# Patient Record
Sex: FEMALE | Race: WHITE | Employment: OTHER | URBAN - NONMETROPOLITAN AREA
[De-identification: names, ages, dates, MRNs, and addresses within clinical notes are randomized per-mention and may not be internally consistent; named-entity substitution may affect disease eponyms.]

---

## 2017-11-15 ENCOUNTER — OFFICE VISIT (OUTPATIENT)
Dept: NEUROLOGY | Age: 82
End: 2017-11-15
Payer: MEDICARE

## 2017-11-15 VITALS
WEIGHT: 88 LBS | BODY MASS INDEX: 18.99 KG/M2 | HEART RATE: 62 BPM | SYSTOLIC BLOOD PRESSURE: 138 MMHG | HEIGHT: 57 IN | DIASTOLIC BLOOD PRESSURE: 64 MMHG

## 2017-11-15 DIAGNOSIS — R56.9 PARTIAL SEIZURE (HCC): Primary | ICD-10-CM

## 2017-11-15 PROCEDURE — G8420 CALC BMI NORM PARAMETERS: HCPCS | Performed by: PSYCHIATRY & NEUROLOGY

## 2017-11-15 PROCEDURE — 1036F TOBACCO NON-USER: CPT | Performed by: PSYCHIATRY & NEUROLOGY

## 2017-11-15 PROCEDURE — G8427 DOCREV CUR MEDS BY ELIG CLIN: HCPCS | Performed by: PSYCHIATRY & NEUROLOGY

## 2017-11-15 PROCEDURE — 1123F ACP DISCUSS/DSCN MKR DOCD: CPT | Performed by: PSYCHIATRY & NEUROLOGY

## 2017-11-15 PROCEDURE — G8484 FLU IMMUNIZE NO ADMIN: HCPCS | Performed by: PSYCHIATRY & NEUROLOGY

## 2017-11-15 PROCEDURE — 4040F PNEUMOC VAC/ADMIN/RCVD: CPT | Performed by: PSYCHIATRY & NEUROLOGY

## 2017-11-15 PROCEDURE — 99213 OFFICE O/P EST LOW 20 MIN: CPT | Performed by: PSYCHIATRY & NEUROLOGY

## 2017-11-15 PROCEDURE — 1090F PRES/ABSN URINE INCON ASSESS: CPT | Performed by: PSYCHIATRY & NEUROLOGY

## 2017-11-15 RX ORDER — LEVETIRACETAM 500 MG/1
500 TABLET ORAL 2 TIMES DAILY
Qty: 180 TABLET | Refills: 3 | Status: SHIPPED | OUTPATIENT
Start: 2017-11-15 | End: 2018-02-12 | Stop reason: SDUPTHER

## 2017-12-19 ENCOUNTER — OFFICE VISIT (OUTPATIENT)
Dept: FAMILY MEDICINE CLINIC | Age: 82
End: 2017-12-19
Payer: MEDICARE

## 2017-12-19 VITALS
HEIGHT: 57 IN | DIASTOLIC BLOOD PRESSURE: 80 MMHG | WEIGHT: 84 LBS | HEART RATE: 68 BPM | TEMPERATURE: 97.8 F | BODY MASS INDEX: 18.12 KG/M2 | RESPIRATION RATE: 18 BRPM | SYSTOLIC BLOOD PRESSURE: 122 MMHG

## 2017-12-19 DIAGNOSIS — K05.10 GINGIVITIS DUE TO DENTAL PLAQUE: Primary | ICD-10-CM

## 2017-12-19 PROCEDURE — 99213 OFFICE O/P EST LOW 20 MIN: CPT | Performed by: NURSE PRACTITIONER

## 2017-12-19 RX ORDER — CHLORHEXIDINE GLUCONATE 0.12 MG/ML
15 RINSE ORAL 2 TIMES DAILY
Qty: 420 ML | Refills: 0 | Status: SHIPPED | OUTPATIENT
Start: 2017-12-19 | End: 2018-01-02

## 2017-12-19 RX ORDER — DICYCLOMINE HCL 20 MG
TABLET ORAL
COMMUNITY
Start: 2017-12-12 | End: 2017-12-19

## 2017-12-19 RX ORDER — POTASSIUM CHLORIDE 750 MG/1
20 TABLET, FILM COATED, EXTENDED RELEASE ORAL
COMMUNITY
Start: 2017-12-19

## 2017-12-19 RX ORDER — CHOLESTYRAMINE 4 G/9G
POWDER, FOR SUSPENSION ORAL
COMMUNITY
Start: 2017-09-13 | End: 2018-11-19

## 2017-12-19 RX ORDER — LOPERAMIDE HYDROCHLORIDE 2 MG/1
2 CAPSULE ORAL 4 TIMES DAILY PRN
Status: ON HOLD | COMMUNITY
End: 2021-02-04

## 2017-12-19 ASSESSMENT — ENCOUNTER SYMPTOMS
RHINORRHEA: 0
SHORTNESS OF BREATH: 0
VOMITING: 0
SORE THROAT: 1
COUGH: 0
TROUBLE SWALLOWING: 0

## 2017-12-19 NOTE — PROGRESS NOTES
[address]  [address] [address]  Dept: [phone]  Dept Fax: [phone] [facility]       Chief Complaint   Patient presents with    Oral Pain     tongue sores and cheek sores        Nurses Notes reviewed and I agree except as noted in the HPI. HISTORY OF PRESENT ILLNESS   Taj Lan is a 80 y.o. female who presents with c/o tongue and mouth pain. Acute on chronic. Initial onset \"10 years\" ago. Symptoms waxing/waining. Mouth pain is intermittent. Rates 6/61, worse with certain food/fluids. Associated wt loss due to pancrease issues. 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 Treated by Dr. Armin Aj with topical Fluocinonide. Patient ran out of medication, but also notes it never worked. She has also been treated by her dentist with Peridex and states that it worked the best.  [address] significant for vitamin B12 deficiency. She has appt with PCP on Friday. REVIEW OF SYSTEMS     Review of Systems   Constitutional: Negative for appetite change, chills, diaphoresis, fatigue, fever and unexpected weight change. HENT: Positive for sore throat (sore tongue and gums). Negative for congestion, ear pain, rhinorrhea and trouble swallowing. Respiratory: Negative for cough and shortness of breath. Cardiovascular: Negative for chest pain. Gastrointestinal: Negative for vomiting. Skin: Negative for rash. Hematological: Negative for adenopathy. PAST MEDICAL HISTORY         Diagnosis Date    Chronic pancreatitis ([address]     Fibromyalgia     GERD (gastroesophageal reflux disease)     Hyperlipidemia     Hypertension     Low potassium syndrome     Osteoarthritis     Raynaud disease     Seizures ([address]        SURGICAL HISTORY     Patient  has a past surgical history that includes Appendectomy;  Cholecystectomy; eye surgery; hernia repair; Hysterectomy; Colonoscopy; and Upper gastrointestinal endoscopy. CURRENT MEDICATIONS       Current Outpatient Prescriptions on File Prior to Visit   Medication Sig Dispense Refill    levETIRAcetam (KEPPRA) 500 MG tablet Take 1 tablet by mouth 2 times daily 180 tablet 3    amLODIPine (NORVASC) 2.5 MG tablet Take 2.5 mg by mouth daily      pantoprazole (PROTONIX) 40 MG tablet Take 1 tablet by mouth 2 times daily (Patient taking differently: Take 40 mg by mouth daily ) 30 tablet 3    magnesium oxide (MAG-OX) 400 (240 MG) MG tablet Take 1 tablet by mouth daily 30 tablet 0    dicyclomine (BENTYL) 10 MG capsule Take 20 mg by mouth 3 times daily       atorvastatin (LIPITOR) 40 MG tablet Take 40 mg by mouth daily      metoprolol tartrate (LOPRESSOR) 25 MG tablet Take 25 mg by mouth 2 times daily      vitamin D3 (CHOLECALCIFEROL) 400 UNITS TABS tablet Take 400 Units by mouth daily      mirtazapine (REMERON) 15 MG tablet Take 15 mg by mouth nightly      QUEtiapine (SEROQUEL) 25 MG tablet Take 25 mg by mouth nightly       melatonin 3 MG TABS tablet Take 3 mg by mouth daily      cyanocobalamin 1000 MCG/ML injection Inject 1,000 mcg into the muscle every 30 days       Multiple Vitamin (MULTI VITAMIN PO) Take 1 tablet by mouth daily       No current facility-administered medications on file prior to visit. ALLERGIES     Patient is is allergic to amoxil [amoxicillin]; clindamycin/lincomycin; lyrica [pregabalin]; procardia [nifedipine]; and tenormin [atenolol]. FAMILY HISTORY     Patient's family history includes Arthritis in her father; Cancer in her mother; Hearing Loss in her father; Heart Disease in her maternal grandmother; High Blood Pressure in her maternal aunt, maternal grandmother, and mother; Stroke in her father. SOCIAL HISTORY     Patient  reports that she has never smoked. She has never used smokeless tobacco. She reports that she does not drink alcohol or use drugs.     PHYSICAL EXAM     ED TRIAGE VITALS  BP: 122/80, Temp: plaque         DISPOSITION/PLAN     Non-toxic, no acute distress. Exam c/w gingivitis. She also has tongue swelling with no color change. No erythema or ulcerations to tongue. Patient discharged home in stable condition. Rx Peridex. Discharge instructions given by staff. PATIENT REFERRED TO:    Follow up with PCP as scheduled. If worse in any way please go to ER.     DISCHARGE MEDICATIONS:  New Prescriptions    CHLORHEXIDINE (PERIDEX) 0.12 % SOLUTION    Take 15 mLs by mouth 2 times daily for 14 days         Electronically signed by Rina Lan NP on 12/19/2017 at 10:31 AM

## 2017-12-19 NOTE — PATIENT INSTRUCTIONS
box to learn more about \"Periodontal Conditions: Care Instructions. \"     If you do not have an account, please click on the \"Sign Up Now\" link. Current as of: May 12, 2017  Content Version: 11.4  © 6278-3846 [employer]. Care instructions adapted under license by [facility] ([facility]). If you have questions about a medical condition or this instruction, always ask your healthcare professional. [address] any warranty or liability for your use of this information.

## 2018-02-12 RX ORDER — LEVETIRACETAM 500 MG/1
500 TABLET ORAL 2 TIMES DAILY
Qty: 180 TABLET | Refills: 3 | Status: SHIPPED | OUTPATIENT
Start: 2018-02-12 | End: 2018-11-19 | Stop reason: SDUPTHER

## 2018-11-19 ENCOUNTER — OFFICE VISIT (OUTPATIENT)
Dept: NEUROLOGY | Age: 83
End: 2018-11-19
Payer: MEDICARE

## 2018-11-19 VITALS
SYSTOLIC BLOOD PRESSURE: 106 MMHG | HEIGHT: 57 IN | DIASTOLIC BLOOD PRESSURE: 72 MMHG | HEART RATE: 62 BPM | WEIGHT: 87 LBS | BODY MASS INDEX: 18.77 KG/M2

## 2018-11-19 DIAGNOSIS — G40.909 SEIZURE DISORDER (HCC): Primary | ICD-10-CM

## 2018-11-19 DIAGNOSIS — M79.601 RIGHT ARM PAIN: ICD-10-CM

## 2018-11-19 DIAGNOSIS — R29.898 RIGHT ARM WEAKNESS: ICD-10-CM

## 2018-11-19 PROCEDURE — 1036F TOBACCO NON-USER: CPT | Performed by: PSYCHIATRY & NEUROLOGY

## 2018-11-19 PROCEDURE — G8484 FLU IMMUNIZE NO ADMIN: HCPCS | Performed by: PSYCHIATRY & NEUROLOGY

## 2018-11-19 PROCEDURE — 99213 OFFICE O/P EST LOW 20 MIN: CPT | Performed by: PSYCHIATRY & NEUROLOGY

## 2018-11-19 PROCEDURE — 1101F PT FALLS ASSESS-DOCD LE1/YR: CPT | Performed by: PSYCHIATRY & NEUROLOGY

## 2018-11-19 PROCEDURE — G8420 CALC BMI NORM PARAMETERS: HCPCS | Performed by: PSYCHIATRY & NEUROLOGY

## 2018-11-19 PROCEDURE — 4040F PNEUMOC VAC/ADMIN/RCVD: CPT | Performed by: PSYCHIATRY & NEUROLOGY

## 2018-11-19 PROCEDURE — 1123F ACP DISCUSS/DSCN MKR DOCD: CPT | Performed by: PSYCHIATRY & NEUROLOGY

## 2018-11-19 PROCEDURE — 1090F PRES/ABSN URINE INCON ASSESS: CPT | Performed by: PSYCHIATRY & NEUROLOGY

## 2018-11-19 PROCEDURE — G8428 CUR MEDS NOT DOCUMENT: HCPCS | Performed by: PSYCHIATRY & NEUROLOGY

## 2018-11-19 RX ORDER — LEVETIRACETAM 500 MG/1
500 TABLET ORAL 2 TIMES DAILY
Qty: 180 TABLET | Refills: 3 | Status: SHIPPED | OUTPATIENT
Start: 2018-11-19 | End: 2019-11-18 | Stop reason: SDUPTHER

## 2018-11-19 RX ORDER — DIPHENOXYLATE HYDROCHLORIDE AND ATROPINE SULFATE 2.5; .025 MG/1; MG/1
1 TABLET ORAL 4 TIMES DAILY PRN
Status: ON HOLD | COMMUNITY
End: 2021-02-04

## 2018-11-19 RX ORDER — TRIAMCINOLONE ACETONIDE 0.25 MG/G
OINTMENT TOPICAL 2 TIMES DAILY PRN
COMMUNITY

## 2018-11-19 NOTE — PROGRESS NOTES
Plan:  1. Referral to Dr Haris Jean Baptiste re right upper extremity weakness and pain. 2. Continue current Keppra 500 mg oral twice a day, refills given. 3.  Follow up in 1 year or sooner if needed. 4. Report any new events. Call if any questions or concerns. Please call if any questions.      Mikhail Hudson MD

## 2019-11-18 ENCOUNTER — OFFICE VISIT (OUTPATIENT)
Dept: NEUROLOGY | Age: 84
End: 2019-11-18
Payer: MEDICARE

## 2019-11-18 VITALS
HEART RATE: 68 BPM | WEIGHT: 81.6 LBS | DIASTOLIC BLOOD PRESSURE: 82 MMHG | SYSTOLIC BLOOD PRESSURE: 122 MMHG | BODY MASS INDEX: 18.88 KG/M2 | HEIGHT: 55 IN

## 2019-11-18 DIAGNOSIS — G40.909 SEIZURE DISORDER (HCC): Primary | ICD-10-CM

## 2019-11-18 PROCEDURE — 1036F TOBACCO NON-USER: CPT | Performed by: PSYCHIATRY & NEUROLOGY

## 2019-11-18 PROCEDURE — G8427 DOCREV CUR MEDS BY ELIG CLIN: HCPCS | Performed by: PSYCHIATRY & NEUROLOGY

## 2019-11-18 PROCEDURE — G8484 FLU IMMUNIZE NO ADMIN: HCPCS | Performed by: PSYCHIATRY & NEUROLOGY

## 2019-11-18 PROCEDURE — 4040F PNEUMOC VAC/ADMIN/RCVD: CPT | Performed by: PSYCHIATRY & NEUROLOGY

## 2019-11-18 PROCEDURE — 99213 OFFICE O/P EST LOW 20 MIN: CPT | Performed by: PSYCHIATRY & NEUROLOGY

## 2019-11-18 PROCEDURE — 1123F ACP DISCUSS/DSCN MKR DOCD: CPT | Performed by: PSYCHIATRY & NEUROLOGY

## 2019-11-18 PROCEDURE — 1090F PRES/ABSN URINE INCON ASSESS: CPT | Performed by: PSYCHIATRY & NEUROLOGY

## 2019-11-18 PROCEDURE — G8420 CALC BMI NORM PARAMETERS: HCPCS | Performed by: PSYCHIATRY & NEUROLOGY

## 2019-11-18 RX ORDER — FAMOTIDINE 40 MG/1
40 TABLET, FILM COATED ORAL NIGHTLY
Status: ON HOLD | COMMUNITY
End: 2020-08-04 | Stop reason: HOSPADM

## 2019-11-18 RX ORDER — LEVETIRACETAM 500 MG/1
500 TABLET ORAL 2 TIMES DAILY
Qty: 180 TABLET | Refills: 3 | Status: SHIPPED | OUTPATIENT
Start: 2019-11-18 | End: 2020-09-16 | Stop reason: SDUPTHER

## 2020-07-14 ENCOUNTER — TELEPHONE (OUTPATIENT)
Dept: NEUROLOGY | Age: 85
End: 2020-07-14

## 2020-07-16 ENCOUNTER — TELEPHONE (OUTPATIENT)
Dept: NEUROLOGY | Age: 85
End: 2020-07-16

## 2020-07-16 NOTE — TELEPHONE ENCOUNTER
----- Message from Elizabeth Vazquez MD sent at 7/16/2020  2:13 PM EDT -----  Please let the patient know the Keppra level is elevated at 55, can we schedule her for a virtual visit early next week with me thank you.    Elizabeth Vazquez MD

## 2020-07-16 NOTE — RESULT ENCOUNTER NOTE
Please let the patient know the Keppra level is elevated at 55, can we schedule her for a virtual visit early next week with me thank you.    Aubrey Anna MD

## 2020-07-22 ENCOUNTER — OFFICE VISIT (OUTPATIENT)
Dept: NEUROLOGY | Age: 85
End: 2020-07-22
Payer: MEDICARE

## 2020-07-22 VITALS
WEIGHT: 94.4 LBS | DIASTOLIC BLOOD PRESSURE: 80 MMHG | HEART RATE: 88 BPM | HEIGHT: 55 IN | SYSTOLIC BLOOD PRESSURE: 112 MMHG | BODY MASS INDEX: 21.85 KG/M2

## 2020-07-22 PROCEDURE — G8420 CALC BMI NORM PARAMETERS: HCPCS | Performed by: PSYCHIATRY & NEUROLOGY

## 2020-07-22 PROCEDURE — 1036F TOBACCO NON-USER: CPT | Performed by: PSYCHIATRY & NEUROLOGY

## 2020-07-22 PROCEDURE — 99213 OFFICE O/P EST LOW 20 MIN: CPT | Performed by: PSYCHIATRY & NEUROLOGY

## 2020-07-22 PROCEDURE — 4040F PNEUMOC VAC/ADMIN/RCVD: CPT | Performed by: PSYCHIATRY & NEUROLOGY

## 2020-07-22 PROCEDURE — 1090F PRES/ABSN URINE INCON ASSESS: CPT | Performed by: PSYCHIATRY & NEUROLOGY

## 2020-07-22 PROCEDURE — 1123F ACP DISCUSS/DSCN MKR DOCD: CPT | Performed by: PSYCHIATRY & NEUROLOGY

## 2020-07-22 PROCEDURE — G8427 DOCREV CUR MEDS BY ELIG CLIN: HCPCS | Performed by: PSYCHIATRY & NEUROLOGY

## 2020-07-22 RX ORDER — BUDESONIDE 3 MG/1
6 CAPSULE, COATED PELLETS ORAL EVERY MORNING
COMMUNITY

## 2020-07-22 RX ORDER — ONDANSETRON HYDROCHLORIDE 8 MG/1
8 TABLET, FILM COATED ORAL EVERY 8 HOURS PRN
COMMUNITY

## 2020-07-22 RX ORDER — MECLIZINE HYDROCHLORIDE 25 MG/1
12.5 TABLET ORAL 3 TIMES DAILY PRN
COMMUNITY

## 2020-07-22 RX ORDER — FUROSEMIDE 20 MG/1
20 TABLET ORAL 2 TIMES DAILY
Status: ON HOLD | COMMUNITY
End: 2020-08-04 | Stop reason: SDUPTHER

## 2020-07-22 NOTE — PATIENT INSTRUCTIONS
1. Change Keppra to 500 mg in am and 250 mg at night. 2. Continue with physical therapy recommendations re gait safety. 3. Recheck Keppra level in two weeks. 4. Seizure precautions. 5. Follow up in 1 year or sooner if needed. 6. Report any new events. Call if any questions or concerns.

## 2020-07-22 NOTE — PROGRESS NOTES
NEUROLOGY OUT PATIENT FOLLOW UP NOTE:  7/22/20202:03 PM    Cindy Nix is here for follow up for seizure disorder. The patient denies any seizure. Her daughter Jessenia Torres called stating patient is having balance issues with falls for the past 6 months. 2 weeks ago, she had a witnessed fall and hit her head. She went to the ED for evaluation. She is taking Keppra 500 mg 2 times a day. Daughter denies missing any doses. She comes in with family to discuss plan of care going forward. .     ROS:  Respiratory : no cough, no SOB  Skin: no rash. No flank pain, of hematuria.        Allergies   Allergen Reactions    Amoxil [Amoxicillin] Other (See Comments)     SORES IN MOUTH    Clindamycin/Lincomycin Other (See Comments)     SORES IN MOUTH    Lyrica [Pregabalin] Other (See Comments)     SORES IN MOUTH    Procardia [Nifedipine] Other (See Comments)     SORES IN MOUTH    Tenormin [Atenolol] Other (See Comments)     SORES IN MOUTH       Current Outpatient Medications:     furosemide (LASIX) 20 MG tablet, Take 20 mg by mouth 2 times daily, Disp: , Rfl:     budesonide (ENTOCORT EC) 3 MG extended release capsule, Take 6 mg by mouth every morning, Disp: , Rfl:     ondansetron (ZOFRAN) 8 MG tablet, Take 8 mg by mouth every 8 hours as needed for Nausea or Vomiting, Disp: , Rfl:     Magic Mouthwash (MIRACLE MOUTHWASH), Swish and spit 5 mLs 4 times daily as needed for Irritation, Disp: , Rfl:     meclizine (ANTIVERT) 25 MG tablet, Take 25 mg by mouth 3 times daily as needed, Disp: , Rfl:     levETIRAcetam (KEPPRA) 500 MG tablet, Take 1 tablet by mouth 2 times daily, Disp: 180 tablet, Rfl: 3    ZENPEP 74495 units CPEP, Take by mouth 3 times daily (with meals) , Disp: , Rfl:     KLOR-CON 10 10 MEQ extended release tablet, Take 10 mEq by mouth 3 times daily Take 1 tablet in AM, 2 tablets at noon, and 1 tablet at supper, Disp: , Rfl:     amLODIPine (NORVASC) 2.5 MG tablet, Take 2.5 mg by mouth daily, Disp: , Rfl:    Multiple Vitamin (MULTI VITAMIN PO), Take 1 tablet by mouth daily, Disp: , Rfl:     pantoprazole (PROTONIX) 40 MG tablet, Take 1 tablet by mouth 2 times daily (Patient taking differently: Take 40 mg by mouth daily ), Disp: 30 tablet, Rfl: 3    atorvastatin (LIPITOR) 40 MG tablet, Take 40 mg by mouth daily, Disp: , Rfl:     metoprolol tartrate (LOPRESSOR) 25 MG tablet, Take 25 mg by mouth 2 times daily, Disp: , Rfl:     vitamin D3 (CHOLECALCIFEROL) 400 UNITS TABS tablet, Take 400 Units by mouth daily, Disp: , Rfl:     mirtazapine (REMERON) 15 MG tablet, Take 15 mg by mouth nightly, Disp: , Rfl:     QUEtiapine (SEROQUEL) 25 MG tablet, Take 25 mg by mouth nightly , Disp: , Rfl:     melatonin 3 MG TABS tablet, Take 10 mg by mouth daily , Disp: , Rfl:     famotidine (PEPCID) 40 MG tablet, Take 40 mg by mouth nightly, Disp: , Rfl:     diphenoxylate-atropine (LOMOTIL) 2.5-0.025 MG per tablet, Take 1 tablet by mouth 4 times daily as needed for Diarrhea. ., Disp: , Rfl:     triamcinolone (KENALOG) 0.025 % ointment, Apply topically 2 times daily Apply topically 2 times daily. , Disp: , Rfl:     Psyllium (METAMUCIL FIBER PO), Take by mouth, Disp: , Rfl:     loperamide (IMODIUM) 2 MG capsule, Take 2 mg by mouth 4 times daily as needed for Diarrhea, Disp: , Rfl:     magnesium oxide (MAG-OX) 400 (240 MG) MG tablet, Take 1 tablet by mouth daily (Patient not taking: Reported on 7/22/2020), Disp: 30 tablet, Rfl: 0    dicyclomine (BENTYL) 10 MG capsule, Take 10 mg by mouth 3 times daily , Disp: , Rfl:     cyanocobalamin 1000 MCG/ML injection, Inject 1,000 mcg into the muscle every 30 days , Disp: , Rfl:       PE:   Vitals:    07/22/20 1348   BP: 112/80   Site: Left Upper Arm   Position: Sitting   Cuff Size: Small Adult   Pulse: 88   Weight: 94 lb 6.4 oz (42.8 kg)   Height: 4' 7\" (1.397 m)     General Appearance:  awake, alert, oriented, cooperative, well groomed. Gen: Language is Intact.   Head:  no icterus  Neck: There is no carotid bruits. The Neck is supple. Neuro: CN 2-12 grossly intact with no focal deficits. Power she feels the right arm is weak, proximal, 5/5 Throughout symmetric, Reflexes are  symmetric. Long tracts are intact. Cerebellar exam is Intact. Sensory exam is intact to light touch. Gait is intact. 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 There is no LROM in any of the 4 extremity joints. Keppra level = 55 done at [facility]. Assessment:     Diagnosis Orders   1. Seizure disorder [facility])        The patient denies any seizure. Her daughter Tuan Armas called stating patient is having balance issues with falls for the past 6 months. 2 weeks ago, she had a witnessed fall and hit her head. She went to the ED for evaluation. She is taking Keppra 500 mg 2 times a day. Daughter denies missing any doses. Daughter denies seizures. Patient started Physical therapy today. After detailed discussion with patient and her family. we agreed on the following plan. Plan:  1. Change Keppra to 500 mg in am and 250 mg at night. 2. Continue with physical therapy recommendations re gait safety. 3. Recheck Keppra level in two weeks. 4. Seizure precautions. 5. Follow up in 1 year or sooner if needed. 6. Report any new events. Call if any questions or concerns. Please call if any questions.      Jihan Ordoñez MD

## 2020-08-01 ENCOUNTER — APPOINTMENT (OUTPATIENT)
Dept: GENERAL RADIOLOGY | Age: 85
DRG: 690 | End: 2020-08-01
Payer: MEDICARE

## 2020-08-01 ENCOUNTER — HOSPITAL ENCOUNTER (INPATIENT)
Age: 85
LOS: 3 days | Discharge: NURSING, REHAB OR LONG-TERM CARE | DRG: 690 | End: 2020-08-04
Attending: INTERNAL MEDICINE | Admitting: INTERNAL MEDICINE
Payer: MEDICARE

## 2020-08-01 LAB
ACINETOBACTER BAUMANNII FILM ARRAY: NOT DETECTED
ALBUMIN SERPL-MCNC: 3.6 G/DL (ref 3.5–5.1)
ALP BLD-CCNC: 147 U/L (ref 38–126)
ALT SERPL-CCNC: 38 U/L (ref 11–66)
ANION GAP SERPL CALCULATED.3IONS-SCNC: 18 MEQ/L (ref 8–16)
AST SERPL-CCNC: 34 U/L (ref 5–40)
BACTERIA: ABNORMAL /HPF
BASOPHILS # BLD: 0.2 %
BASOPHILS ABSOLUTE: 0 THOU/MM3 (ref 0–0.1)
BILIRUB SERPL-MCNC: 0.6 MG/DL (ref 0.3–1.2)
BILIRUBIN DIRECT: < 0.2 MG/DL (ref 0–0.3)
BILIRUBIN URINE: NEGATIVE
BLOOD, URINE: ABNORMAL
BOTTLE TYPE: ABNORMAL
BUN BLDV-MCNC: 40 MG/DL (ref 7–22)
C-REACTIVE PROTEIN: 21.56 MG/DL (ref 0–1)
CALCIUM SERPL-MCNC: 9.2 MG/DL (ref 8.5–10.5)
CANDIDA ALBICANS FILM ARRAY: NOT DETECTED
CANDIDA GLABRATA FILM ARRAY: NOT DETECTED
CANDIDA KRUSEI FILM ARRAY: NOT DETECTED
CANDIDA PARAPSILOSIS FILM ARRAY: NOT DETECTED
CANDIDA TROPICALIS FILM ARRAY: NOT DETECTED
CARBAPENEM RESITANT FILM ARRAY: NOT DETECTED
CASTS 2: ABNORMAL /LPF
CASTS UA: ABNORMAL /LPF
CHARACTER, URINE: ABNORMAL
CHLORIDE BLD-SCNC: 100 MEQ/L (ref 98–111)
CO2: 17 MEQ/L (ref 23–33)
COLOR: YELLOW
CREAT SERPL-MCNC: 2 MG/DL (ref 0.4–1.2)
CRYSTALS, UA: ABNORMAL
EKG ATRIAL RATE: 102 BPM
EKG P AXIS: -4 DEGREES
EKG P-R INTERVAL: 144 MS
EKG Q-T INTERVAL: 382 MS
EKG QRS DURATION: 126 MS
EKG QTC CALCULATION (BAZETT): 497 MS
EKG R AXIS: 81 DEGREES
EKG T AXIS: -20 DEGREES
EKG VENTRICULAR RATE: 102 BPM
ENTERBACTER CLOACAE FILM ARRAY: NOT DETECTED
ENTERBACTERIACEAE FILM ARRAY: DETECTED
ENTEROCOCCUS FILM ARRAY: NOT DETECTED
EOSINOPHIL # BLD: 0 %
EOSINOPHILS ABSOLUTE: 0 THOU/MM3 (ref 0–0.4)
EPITHELIAL CELLS, UA: ABNORMAL /HPF
ERYTHROCYTE [DISTWIDTH] IN BLOOD BY AUTOMATED COUNT: 13.9 % (ref 11.5–14.5)
ERYTHROCYTE [DISTWIDTH] IN BLOOD BY AUTOMATED COUNT: 47.9 FL (ref 35–45)
ESCHERICHIA COLI FILM ARRAY: DETECTED
GFR SERPL CREATININE-BSD FRML MDRD: 23 ML/MIN/1.73M2
GLUCOSE BLD-MCNC: 119 MG/DL (ref 70–108)
GLUCOSE URINE: NEGATIVE MG/DL
HAEMOPHILUS INFLUENZA FILM ARRAY: NOT DETECTED
HCT VFR BLD CALC: 33.4 % (ref 37–47)
HEMOGLOBIN: 10.8 GM/DL (ref 12–16)
IMMATURE GRANS (ABS): 0.11 THOU/MM3 (ref 0–0.07)
IMMATURE GRANULOCYTES: 0.9 %
KETONES, URINE: NEGATIVE
KLEBSIELLA OXYTOCA FILM ARRAY: NOT DETECTED
KLEBSIELLA PNEUMONIAE FILM ARRAY: NOT DETECTED
LACTIC ACID: 0.8 MMOL/L (ref 0.5–2.2)
LD: 201 U/L (ref 100–190)
LEUKOCYTE ESTERASE, URINE: ABNORMAL
LIPASE: 14.9 U/L (ref 5.6–51.3)
LISTERIA MONOCYTOGENES FILM ARRAY: NOT DETECTED
LYMPHOCYTES # BLD: 5.4 %
LYMPHOCYTES ABSOLUTE: 0.7 THOU/MM3 (ref 1–4.8)
MAGNESIUM: 2.2 MG/DL (ref 1.6–2.4)
MCH RBC QN AUTO: 30.3 PG (ref 26–33)
MCHC RBC AUTO-ENTMCNC: 32.3 GM/DL (ref 32.2–35.5)
MCV RBC AUTO: 93.8 FL (ref 81–99)
METHICILLIN RESISTANT FILM ARRAY: ABNORMAL
MISCELLANEOUS 2: ABNORMAL
MONOCYTES # BLD: 9 %
MONOCYTES ABSOLUTE: 1.1 THOU/MM3 (ref 0.4–1.3)
NEISSERIA MENIGITIDIS FILM ARRAY: NOT DETECTED
NITRITE, URINE: NEGATIVE
NUCLEATED RED BLOOD CELLS: 0 /100 WBC
OSMOLALITY CALCULATION: 281 MOSMOL/KG (ref 275–300)
PH UA: 5.5 (ref 5–9)
PLATELET # BLD: 178 THOU/MM3 (ref 130–400)
PMV BLD AUTO: 8.7 FL (ref 9.4–12.4)
POTASSIUM SERPL-SCNC: 3.9 MEQ/L (ref 3.5–5.2)
PROCALCITONIN: 17.24 NG/ML (ref 0.01–0.09)
PROTEIN UA: 30
PROTEUS FILM ARRAY: NOT DETECTED
PSEUDOMONAS AERUGINOSA FILM ARRAY: NOT DETECTED
RBC # BLD: 3.56 MILL/MM3 (ref 4.2–5.4)
RBC URINE: ABNORMAL /HPF
RENAL EPITHELIAL, UA: ABNORMAL
SARS-COV-2, PCR: NOT DETECTED
SEG NEUTROPHILS: 84.5 %
SEGMENTED NEUTROPHILS ABSOLUTE COUNT: 10.6 THOU/MM3 (ref 1.8–7.7)
SERRATIA MARCESCENS FILM ARRAY: NOT DETECTED
SODIUM BLD-SCNC: 135 MEQ/L (ref 135–145)
SOURCE OF BLOOD CULTURE: ABNORMAL
SPECIFIC GRAVITY, URINE: 1.01 (ref 1–1.03)
STAPH AUREUS FILM ARRAY: NOT DETECTED
STAPHYLOCOCCUS FILM ARRAY: NOT DETECTED
STREP AGALACTIAE FILM ARRAY: NOT DETECTED
STREP PNEUMONIAE FILM ARRAY: NOT DETECTED
STREP PYOCGENES FILM ARRAY: NOT DETECTED
STREPTOCOCCUS FILM ARRAY: NOT DETECTED
TOTAL PROTEIN: 7 G/DL (ref 6.1–8)
TROPONIN T: < 0.01 NG/ML
UROBILINOGEN, URINE: 0.2 EU/DL (ref 0–1)
VANCOMYCIN RESISTANT FILM ARRAY: ABNORMAL
WBC # BLD: 12.6 THOU/MM3 (ref 4.8–10.8)
WBC UA: > 100 /HPF
YEAST: ABNORMAL

## 2020-08-01 PROCEDURE — 6370000000 HC RX 637 (ALT 250 FOR IP): Performed by: STUDENT IN AN ORGANIZED HEALTH CARE EDUCATION/TRAINING PROGRAM

## 2020-08-01 PROCEDURE — U0002 COVID-19 LAB TEST NON-CDC: HCPCS

## 2020-08-01 PROCEDURE — 83615 LACTATE (LD) (LDH) ENZYME: CPT

## 2020-08-01 PROCEDURE — 84484 ASSAY OF TROPONIN QUANT: CPT

## 2020-08-01 PROCEDURE — 36415 COLL VENOUS BLD VENIPUNCTURE: CPT

## 2020-08-01 PROCEDURE — 86140 C-REACTIVE PROTEIN: CPT

## 2020-08-01 PROCEDURE — 93005 ELECTROCARDIOGRAM TRACING: CPT | Performed by: NURSE PRACTITIONER

## 2020-08-01 PROCEDURE — 83690 ASSAY OF LIPASE: CPT

## 2020-08-01 PROCEDURE — 87077 CULTURE AEROBIC IDENTIFY: CPT

## 2020-08-01 PROCEDURE — 2580000003 HC RX 258: Performed by: PHYSICIAN ASSISTANT

## 2020-08-01 PROCEDURE — 6360000002 HC RX W HCPCS: Performed by: NURSE PRACTITIONER

## 2020-08-01 PROCEDURE — 87040 BLOOD CULTURE FOR BACTERIA: CPT

## 2020-08-01 PROCEDURE — 6370000000 HC RX 637 (ALT 250 FOR IP): Performed by: PHYSICIAN ASSISTANT

## 2020-08-01 PROCEDURE — 2060000000 HC ICU INTERMEDIATE R&B

## 2020-08-01 PROCEDURE — 2580000003 HC RX 258: Performed by: NURSE PRACTITIONER

## 2020-08-01 PROCEDURE — 87186 SC STD MICRODIL/AGAR DIL: CPT

## 2020-08-01 PROCEDURE — 6360000002 HC RX W HCPCS: Performed by: INTERNAL MEDICINE

## 2020-08-01 PROCEDURE — 71045 X-RAY EXAM CHEST 1 VIEW: CPT

## 2020-08-01 PROCEDURE — 80053 COMPREHEN METABOLIC PANEL: CPT

## 2020-08-01 PROCEDURE — 87086 URINE CULTURE/COLONY COUNT: CPT

## 2020-08-01 PROCEDURE — 84145 PROCALCITONIN (PCT): CPT

## 2020-08-01 PROCEDURE — 96365 THER/PROPH/DIAG IV INF INIT: CPT

## 2020-08-01 PROCEDURE — 82248 BILIRUBIN DIRECT: CPT

## 2020-08-01 PROCEDURE — 99222 1ST HOSP IP/OBS MODERATE 55: CPT | Performed by: INTERNAL MEDICINE

## 2020-08-01 PROCEDURE — 87801 DETECT AGNT MULT DNA AMPLI: CPT

## 2020-08-01 PROCEDURE — 6360000002 HC RX W HCPCS: Performed by: PHYSICIAN ASSISTANT

## 2020-08-01 PROCEDURE — 81001 URINALYSIS AUTO W/SCOPE: CPT

## 2020-08-01 PROCEDURE — 85025 COMPLETE CBC W/AUTO DIFF WBC: CPT

## 2020-08-01 PROCEDURE — 99284 EMERGENCY DEPT VISIT MOD MDM: CPT

## 2020-08-01 PROCEDURE — 83735 ASSAY OF MAGNESIUM: CPT

## 2020-08-01 PROCEDURE — 83605 ASSAY OF LACTIC ACID: CPT

## 2020-08-01 RX ORDER — 0.9 % SODIUM CHLORIDE 0.9 %
500 INTRAVENOUS SOLUTION INTRAVENOUS ONCE
Status: COMPLETED | OUTPATIENT
Start: 2020-08-01 | End: 2020-08-01

## 2020-08-01 RX ORDER — LEVETIRACETAM 500 MG/1
500 TABLET ORAL 2 TIMES DAILY
Status: DISCONTINUED | OUTPATIENT
Start: 2020-08-01 | End: 2020-08-01

## 2020-08-01 RX ORDER — HEPARIN SODIUM 5000 [USP'U]/ML
5000 INJECTION, SOLUTION INTRAVENOUS; SUBCUTANEOUS EVERY 8 HOURS SCHEDULED
Status: DISCONTINUED | OUTPATIENT
Start: 2020-08-01 | End: 2020-08-04 | Stop reason: HOSPADM

## 2020-08-01 RX ORDER — BUDESONIDE 3 MG/1
6 CAPSULE, COATED PELLETS ORAL EVERY MORNING
Status: DISCONTINUED | OUTPATIENT
Start: 2020-08-01 | End: 2020-08-04 | Stop reason: HOSPADM

## 2020-08-01 RX ORDER — AMLODIPINE BESYLATE 2.5 MG/1
2.5 TABLET ORAL DAILY
Status: DISCONTINUED | OUTPATIENT
Start: 2020-08-01 | End: 2020-08-04 | Stop reason: HOSPADM

## 2020-08-01 RX ORDER — PANTOPRAZOLE SODIUM 40 MG/1
40 TABLET, DELAYED RELEASE ORAL DAILY
Status: DISCONTINUED | OUTPATIENT
Start: 2020-08-01 | End: 2020-08-04 | Stop reason: HOSPADM

## 2020-08-01 RX ORDER — ATORVASTATIN CALCIUM 40 MG/1
40 TABLET, FILM COATED ORAL DAILY
Status: DISCONTINUED | OUTPATIENT
Start: 2020-08-01 | End: 2020-08-04 | Stop reason: HOSPADM

## 2020-08-01 RX ORDER — DICYCLOMINE HYDROCHLORIDE 10 MG/1
10 CAPSULE ORAL 3 TIMES DAILY
Status: DISCONTINUED | OUTPATIENT
Start: 2020-08-01 | End: 2020-08-04 | Stop reason: HOSPADM

## 2020-08-01 RX ORDER — LEVETIRACETAM 250 MG/1
250 TABLET ORAL NIGHTLY
Status: DISCONTINUED | OUTPATIENT
Start: 2020-08-01 | End: 2020-08-04 | Stop reason: HOSPADM

## 2020-08-01 RX ORDER — QUETIAPINE FUMARATE 25 MG/1
25 TABLET, FILM COATED ORAL NIGHTLY
Status: DISCONTINUED | OUTPATIENT
Start: 2020-08-01 | End: 2020-08-04 | Stop reason: HOSPADM

## 2020-08-01 RX ORDER — MECLIZINE HCL 12.5 MG/1
25 TABLET ORAL 3 TIMES DAILY PRN
Status: DISCONTINUED | OUTPATIENT
Start: 2020-08-01 | End: 2020-08-04 | Stop reason: HOSPADM

## 2020-08-01 RX ORDER — LEVETIRACETAM 500 MG/1
500 TABLET ORAL DAILY
Status: DISCONTINUED | OUTPATIENT
Start: 2020-08-02 | End: 2020-08-04 | Stop reason: HOSPADM

## 2020-08-01 RX ORDER — DIPHENOXYLATE HYDROCHLORIDE AND ATROPINE SULFATE 2.5; .025 MG/1; MG/1
1 TABLET ORAL 4 TIMES DAILY PRN
Status: DISCONTINUED | OUTPATIENT
Start: 2020-08-01 | End: 2020-08-04 | Stop reason: HOSPADM

## 2020-08-01 RX ORDER — FAMOTIDINE 20 MG/1
40 TABLET, FILM COATED ORAL NIGHTLY
Status: DISCONTINUED | OUTPATIENT
Start: 2020-08-01 | End: 2020-08-01

## 2020-08-01 RX ORDER — MIRTAZAPINE 15 MG/1
15 TABLET, FILM COATED ORAL NIGHTLY
Status: DISCONTINUED | OUTPATIENT
Start: 2020-08-01 | End: 2020-08-04 | Stop reason: HOSPADM

## 2020-08-01 RX ADMIN — METOPROLOL TARTRATE 25 MG: 25 TABLET ORAL at 13:19

## 2020-08-01 RX ADMIN — HEPARIN SODIUM 5000 UNITS: 5000 INJECTION INTRAVENOUS; SUBCUTANEOUS at 21:59

## 2020-08-01 RX ADMIN — LEVETIRACETAM 500 MG: 500 TABLET, FILM COATED ORAL at 13:19

## 2020-08-01 RX ADMIN — HEPARIN SODIUM 5000 UNITS: 5000 INJECTION INTRAVENOUS; SUBCUTANEOUS at 13:23

## 2020-08-01 RX ADMIN — DICYCLOMINE HYDROCHLORIDE 10 MG: 10 CAPSULE ORAL at 13:19

## 2020-08-01 RX ADMIN — PANTOPRAZOLE SODIUM 40 MG: 40 TABLET, DELAYED RELEASE ORAL at 12:35

## 2020-08-01 RX ADMIN — QUETIAPINE FUMARATE 25 MG: 25 TABLET ORAL at 19:54

## 2020-08-01 RX ADMIN — ATORVASTATIN CALCIUM 40 MG: 40 TABLET, FILM COATED ORAL at 13:19

## 2020-08-01 RX ADMIN — DICYCLOMINE HYDROCHLORIDE 10 MG: 10 CAPSULE ORAL at 19:55

## 2020-08-01 RX ADMIN — CEFTRIAXONE SODIUM 1 G: 1 INJECTION, POWDER, FOR SOLUTION INTRAMUSCULAR; INTRAVENOUS at 06:28

## 2020-08-01 RX ADMIN — SODIUM CHLORIDE 500 ML: 9 INJECTION, SOLUTION INTRAVENOUS at 05:20

## 2020-08-01 RX ADMIN — AMLODIPINE BESYLATE 2.5 MG: 2.5 TABLET ORAL at 13:19

## 2020-08-01 RX ADMIN — METOPROLOL TARTRATE 25 MG: 25 TABLET ORAL at 19:55

## 2020-08-01 RX ADMIN — LEVETIRACETAM 250 MG: 500 TABLET, FILM COATED ORAL at 21:58

## 2020-08-01 RX ADMIN — MIRTAZAPINE 15 MG: 15 TABLET, FILM COATED ORAL at 19:55

## 2020-08-01 RX ADMIN — BUDESONIDE 6 MG: 3 CAPSULE ORAL at 14:20

## 2020-08-01 RX ADMIN — CEFTRIAXONE SODIUM 1 G: 1 INJECTION, POWDER, FOR SOLUTION INTRAMUSCULAR; INTRAVENOUS at 21:59

## 2020-08-01 ASSESSMENT — ENCOUNTER SYMPTOMS
BLOOD IN STOOL: 0
DIARRHEA: 1
COUGH: 0
BACK PAIN: 1
RHINORRHEA: 0
WHEEZING: 0
SHORTNESS OF BREATH: 0
EYE REDNESS: 0
SINUS PRESSURE: 0
EYE PAIN: 0
ABDOMINAL PAIN: 0
CHEST TIGHTNESS: 0
SINUS PAIN: 0
VOMITING: 0
CHOKING: 0
NAUSEA: 0

## 2020-08-01 ASSESSMENT — PAIN SCALES - GENERAL
PAINLEVEL_OUTOF10: 2
PAINLEVEL_OUTOF10: 0
PAINLEVEL_OUTOF10: 0

## 2020-08-01 ASSESSMENT — PAIN DESCRIPTION - PAIN TYPE: TYPE: ACUTE PAIN

## 2020-08-01 ASSESSMENT — PAIN DESCRIPTION - LOCATION: LOCATION: HEAD

## 2020-08-01 NOTE — ED NOTES
ED to inpatient nurses report    Chief Complaint   Patient presents with    Fatigue    Fever    Leg Pain     left lower      Present to ED from home  LOC: alert and orientated to name, place, date  Vital signs   Vitals:    08/01/20 0432 08/01/20 0521 08/01/20 0631   BP: 114/77 120/84 109/71   Pulse: 104 94 89   Resp: 22 17 17   Temp: 98.6 °F (37 °C)     TempSrc: Oral     SpO2: 99% 100% 100%   Weight: 94 lb (42.6 kg)        Oxygen Baseline Room Air    Current needs required None Bipap/Cpap No  LDAs:   Peripheral IV 08/01/20 Left Antecubital (Active)   Site Assessment Clean; Intact;Dry 08/01/20 0520   Line Status Flushed 08/01/20 0520   Dressing Status Intact;Dry;Clean 08/01/20 0520   Dressing Intervention New 08/01/20 0520     Mobility: Requires assistance * 1  Pending ED orders: Complete  Present condition: Stable    Electronically signed by Julianna Mills RN on 8/1/2020 at [address]  08/01/20 3342

## 2020-08-01 NOTE — ED NOTES
ED nurse-to-nurse bedside report    Chief Complaint   Patient presents with    Fatigue    Fever    Leg Pain     left lower      LOC: alert and orientated to name, place, date  Vital signs   Vitals:    08/01/20 0432 08/01/20 0521 08/01/20 0631   BP: 114/77 120/84 109/71   Pulse: 104 94 89   Resp: 22 17 17   Temp: 98.6 °F (37 °C)     TempSrc: Oral     SpO2: 99% 100% 100%   Weight: 94 lb (42.6 kg)        Pain:    Pain Interventions: none  Pain Goal: 0  Oxygen: No    Current needs required room air   Telemetry: Yes  LDAs:   Peripheral IV 08/01/20 Left Antecubital (Active)   Site Assessment Clean; Intact;Dry 08/01/20 0520   Line Status Flushed 08/01/20 0520   Dressing Status Intact;Dry;Clean 08/01/20 0520   Dressing Intervention New 08/01/20 0520     Continuous Infusions:   Mobility: Requires assistance * 1  Roque Fall Risk Score: No flowsheet data found. Fall Interventions: call light in reach.  Will continue to monitor  Report given to: Saúl Cardoza RN  08/01/20 1591

## 2020-08-01 NOTE — ED NOTES
Pt up in bed and provided blanket. Patient updated on plan of care at this time and expresses no concerns regarding treatment. Patient updated on being admitted. Patient VSS. Respirations are regular and unlabored, patient is alert and oriented x4. Patient bed rails up x2 and call light within reach. Will continue to monitor.  is at the bedside.      Rashmi Gardiner RN  08/01/20 6290

## 2020-08-01 NOTE — PROGRESS NOTES
Lab notified this nurse of preliminary positive blood culture, showing gram negative bacilli. Notified Dr. Marco Baer at this time. Per Dr. Marco Baer, wait for [facility]TAL [facility], and notify Dr. Chasidy Olivares once resulted.

## 2020-08-01 NOTE — ED PROVIDER NOTES
[facility]  eMERGENCY dEPARTMENT eNCOUnter     Pt Name: Tom Lind  MRN: [id number]  Lindagfjosh 6/21/1931  Date of evaluation: 8/1/20        Mid-level provider Note:    I have personally performed and/or participated in the history, exam and medical decision making and agree with all pertinent clinical information as noted by the previous provider. I have also reviewed and agree with the past medical, family and social history unless otherwise noted. I have personally performed a face to face diagnostic evaluation on this patient. I have reviewed the previous provider's findings and agree. Evaluation: Patient was handed off to me by [facility], we are awaiting further lab work. Lab work was reviewed, acute kidney injury is noted, patient was evaluated at bedside, she appears to be very fatigued and ill, urine shows sign of UTI. Discussed my findings my plan of care with Dr. Jordin Evangelista who graciously agrees to admit the patient for further evaluation and treatment. Patient was amenable with this plan of care. 1. Urinary tract infection without hematuria, site unspecified    2.  ARIS (acute kidney injury) [facility])          DISPOSITION/PLAN  PATIENT REFERRED TO:  Marlen Crowe DO  [address]  [phone]          DISCHARGE MEDICATIONS:  Current Discharge Medication List            JEFFERY Rodriguez CNP, APRN - CNP  08/01/20 7919

## 2020-08-01 NOTE — ED TRIAGE NOTES
Pt to ED via intake with c/o of weakness and fatigue for a month. Pt reports they had a concussion a month ago and reports ever since that they have felt week. Pt is A&O x4. Pt respiration easy and unlabored. Pt reports having pain in their back. Pt denies chest pain, pt denies feeing SOB. Pt VSS. ekg completed, tele applied. Will continue to monitor.

## 2020-08-01 NOTE — H&P
[address] [facility]        Pt Name: Stephany Lindsey  MRN: [id number]  [place] 6/21/1931  Date of evaluation: 8/1/2020  Treating Resident Physician: Lynette Serna DO  Supervising Physician: Gi Rodriguez, [facility]       Chief Complaint   Patient presents with    Fatigue    Fever    Leg Pain     left lower     History obtained from the patient. HISTORY OF PRESENT ILLNESS      Stephany Lindsey is a 80 y.o. female who presents with weakness that has progressed over the last month. Patient states she fell a few weeks ago and was diagnosed with a concussion. Since her fall she has had persistent symptoms of fatigue and is having difficulty completing her daily activities. Dion Majano states having trouble getting herself out of bed and eating regularly because of her symptoms. Nothing helps her feel better and the feelings are exacerbated by any physical activity. Additionally, the patient has a bandage on her left lower leg that she says is a cut she received from hitting her leg on a piece of furniture after a fall. She reports no associated symptoms to her weakness. The patient has no other acute complaints at this time. REVIEW OF SYSTEMS   Review of Systems   Constitutional: Positive for activity change. Negative for chills, diaphoresis, fatigue and fever. HENT: Negative for ear discharge, ear pain, postnasal drip, rhinorrhea, sinus pressure and sinus pain. Eyes: Negative for pain and redness. Respiratory: Negative for cough, choking, chest tightness, shortness of breath and wheezing. Cardiovascular: Negative for chest pain, palpitations and leg swelling. Gastrointestinal: Positive for diarrhea. Negative for abdominal pain, blood in stool, nausea and vomiting. Genitourinary: Negative for difficulty urinating, dysuria, flank pain and hematuria. Musculoskeletal: Positive for arthralgias and back pain. Neurological: Positive for weakness. Negative for dizziness, syncope, light-headedness, numbness and headaches. PAST MEDICAL AND SURGICAL HISTORY     Past Medical History:   Diagnosis Date    Chronic pancreatitis ([address]     Fibromyalgia     GERD (gastroesophageal reflux disease)     Hyperlipidemia     Hypertension     Low potassium syndrome     Osteoarthritis     Raynaud disease     Seizures (HCC)      Past Surgical History:   Procedure Laterality Date    APPENDECTOMY      CHOLECYSTECTOMY      COLONOSCOPY      EYE SURGERY      HERNIA REPAIR      HYSTERECTOMY      UPPER GASTROINTESTINAL ENDOSCOPY           MEDICATIONS   No current facility-administered medications for this encounter. Current Outpatient Medications:     furosemide (LASIX) 20 MG tablet, Take 20 mg by mouth 2 times daily, Disp: , Rfl:     budesonide (ENTOCORT EC) 3 MG extended release capsule, Take 6 mg by mouth every morning, Disp: , Rfl:     ondansetron (ZOFRAN) 8 MG tablet, Take 8 mg by mouth every 8 hours as needed for Nausea or Vomiting, Disp: , Rfl:     Magic Mouthwash (MIRACLE MOUTHWASH), Swish and spit 5 mLs 4 times daily as needed for Irritation, Disp: , Rfl:     meclizine (ANTIVERT) 25 MG tablet, Take 25 mg by mouth 3 times daily as needed, Disp: , Rfl:     famotidine (PEPCID) 40 MG tablet, Take 40 mg by mouth nightly, Disp: , Rfl:     levETIRAcetam (KEPPRA) 500 MG tablet, Take 1 tablet by mouth 2 times daily, Disp: 180 tablet, Rfl: 3    diphenoxylate-atropine (LOMOTIL) 2.5-0.025 MG per tablet, Take 1 tablet by mouth 4 times daily as needed for Diarrhea. ., Disp: , Rfl:     triamcinolone (KENALOG) 0.025 % ointment, Apply topically 2 times daily Apply topically 2 times daily. , Disp: , Rfl:     ZENPEP 83189 units CPEP, Take by mouth 3 times daily (with meals) , Disp: , Rfl:     KLOR-CON 10 10 MEQ extended release tablet, Take 10 mEq by mouth 3 times daily Take 1 tablet in AM, 2 tablets at noon, and 1 tablet at supper, Disp: , Rfl:     Psyllium (METAMUCIL FIBER PO), Take by mouth, Disp: , Rfl:     loperamide (IMODIUM) 2 MG capsule, Take 2 mg by mouth 4 times daily as needed for Diarrhea, Disp: , Rfl:     amLODIPine (NORVASC) 2.5 MG tablet, Take 2.5 mg by mouth daily, Disp: , Rfl:     Multiple Vitamin (MULTI VITAMIN PO), Take 1 tablet by mouth daily, Disp: , Rfl:     pantoprazole (PROTONIX) 40 MG tablet, Take 1 tablet by mouth 2 times daily (Patient taking differently: Take 40 mg by mouth daily ), Disp: 30 tablet, Rfl: 3    magnesium oxide (MAG-OX) 400 (240 MG) MG tablet, Take 1 tablet by mouth daily (Patient not taking: Reported on 7/22/2020), Disp: 30 tablet, Rfl: 0    dicyclomine (BENTYL) 10 MG capsule, Take 10 mg by mouth 3 times daily , Disp: , Rfl:     atorvastatin (LIPITOR) 40 MG tablet, Take 40 mg by mouth daily, Disp: , Rfl:     metoprolol tartrate (LOPRESSOR) 25 MG tablet, Take 25 mg by mouth 2 times daily, Disp: , Rfl:     vitamin D3 (CHOLECALCIFEROL) 400 UNITS TABS tablet, Take 400 Units by mouth daily, Disp: , Rfl:     mirtazapine (REMERON) 15 MG tablet, Take 15 mg by mouth nightly, Disp: , Rfl:     QUEtiapine (SEROQUEL) 25 MG tablet, Take 25 mg by mouth nightly , Disp: , Rfl:     melatonin 3 MG TABS tablet, Take 10 mg by mouth daily , Disp: , Rfl:     cyanocobalamin 1000 MCG/ML injection, Inject 1,000 mcg into the muscle every 30 days , Disp: , Rfl:       SOCIAL HISTORY     Social History     Social History Narrative    Not on file     Social History     Tobacco Use    Smoking status: Never Smoker    Smokeless tobacco: Never Used   Substance Use Topics    Alcohol use: No    Drug use: No         ALLERGIES     Allergies   Allergen Reactions    Amoxil [Amoxicillin] Other (See Comments)     SORES IN MOUTH    Clindamycin/Lincomycin Other (See Comments)     SORES IN MOUTH    Lyrica [Pregabalin] Other (See Comments)     SORES IN MOUTH    Procardia [Nifedipine] Other (See Comments)     SORES IN MOUTH    Tenormin [Atenolol] Other (See Comments)     SORES IN MOUTH         FAMILY HISTORY     Family History   Problem Relation Age of Onset    Cancer Mother     High Blood Pressure Mother     Arthritis Father     Hearing Loss Father     Stroke Father     High Blood Pressure Maternal Aunt     Heart Disease Maternal Grandmother     High Blood Pressure Maternal Grandmother            PHYSICAL EXAM     ED Triage Vitals [08/01/20 0432]   BP Temp Temp Source Pulse Resp SpO2 Height Weight   114/77 98.6 °F (37 °C) Oral 104 22 99 % -- 94 lb (42.6 kg)       Additional Vital Signs:  Vitals:    08/01/20 0720   BP: 115/72   Pulse: 82   Resp: 19   Temp:    SpO2: 100%       Physical Exam  Constitutional:       General: She is not in acute distress. Appearance: She is not diaphoretic. HENT:      Mouth/Throat:      Mouth: Mucous membranes are moist.      Pharynx: Oropharynx is clear. No oropharyngeal exudate or posterior oropharyngeal erythema. Neck:      Musculoskeletal: Normal range of motion and neck supple. No neck rigidity or muscular tenderness. Cardiovascular:      Rate and Rhythm: Normal rate and regular rhythm. Pulses: Normal pulses. Heart sounds: Normal heart sounds. No murmur. No friction rub. No gallop. Pulmonary:      Effort: Pulmonary effort is normal.      Breath sounds: Normal breath sounds. No wheezing, rhonchi or rales. Abdominal:      General: There is no distension. Palpations: Abdomen is soft. Tenderness: There is no abdominal tenderness. There is no right CVA tenderness, left CVA tenderness, guarding or rebound. Skin:     General: Skin is warm and dry. Capillary Refill: Capillary refill takes less than 2 seconds. Comments: 4 cm laceration with sutures in place. There is no erythema, fluid leak, warmth, purulence, or induration. Neurological:      General: No focal deficit present. Mental Status: She is alert and oriented to person, place, and time. Motor: Weakness present. ED RESULTS   Laboratory results:  Labs Reviewed   BASIC METABOLIC PANEL - Abnormal; Notable for the following components:       Result Value    CO2 17 (*)     Glucose 119 (*)     BUN 40 (*)     CREATININE 2.0 (*)     All other components within normal limits   CBC WITH AUTO DIFFERENTIAL - Abnormal; Notable for the following components:    WBC 12.6 (*)     RBC 3.56 (*)     Hemoglobin 10.8 (*)     Hematocrit 33.4 (*)     RDW-SD 47.9 (*)     MPV 8.7 (*)     Segs Absolute 10.6 (*)     Lymphocytes Absolute 0.7 (*)     Immature Grans (Abs) 0.11 (*)     All other components within normal limits   C-REACTIVE PROTEIN - Abnormal; Notable for the following components:    CRP 21.56 (*)     All other components within normal limits   HEPATIC FUNCTION PANEL - Abnormal; Notable for the following components:    Alkaline Phosphatase 147 (*)     All other components within normal limits   LACTATE DEHYDROGENASE - Abnormal; Notable for the following components:     (*)     All other components within normal limits   PROCALCITONIN - Abnormal; Notable for the following components:    Procalcitonin 17.24 (*)     All other components within normal limits   URINE WITH REFLEXED MICRO - Abnormal; Notable for the following components:    Blood, Urine SMALL (*)     Protein, UA 30 (*)     Leukocyte Esterase, Urine LARGE (*)     Character, Urine CLOUDY (*)     All other components within normal limits   ANION GAP - Abnormal; Notable for the following components:    Anion Gap 18.0 (*)     All other components within normal limits   GLOMERULAR FILTRATION RATE, ESTIMATED - Abnormal; Notable for the following components:    Est, Glom Filt Rate 23 (*)     All other components within normal limits   CULTURE, BLOOD 1   CULTURE, BLOOD 2   CULTURE, REFLEXED, URINE    Narrative:     Source: urine, clean catch       Site: clean void          Current Antibiotics: not stated   LACTIC ACID, PLASMA   LIPASE   TROPONIN   OSMOLALITY   COVID-19

## 2020-08-02 PROCEDURE — 2580000003 HC RX 258: Performed by: PHYSICIAN ASSISTANT

## 2020-08-02 PROCEDURE — 2060000000 HC ICU INTERMEDIATE R&B

## 2020-08-02 PROCEDURE — 6360000002 HC RX W HCPCS: Performed by: INTERNAL MEDICINE

## 2020-08-02 PROCEDURE — 6370000000 HC RX 637 (ALT 250 FOR IP): Performed by: PHYSICIAN ASSISTANT

## 2020-08-02 PROCEDURE — 6370000000 HC RX 637 (ALT 250 FOR IP): Performed by: STUDENT IN AN ORGANIZED HEALTH CARE EDUCATION/TRAINING PROGRAM

## 2020-08-02 PROCEDURE — 6360000002 HC RX W HCPCS: Performed by: PHYSICIAN ASSISTANT

## 2020-08-02 PROCEDURE — 99233 SBSQ HOSP IP/OBS HIGH 50: CPT | Performed by: INTERNAL MEDICINE

## 2020-08-02 PROCEDURE — 2580000003 HC RX 258: Performed by: INTERNAL MEDICINE

## 2020-08-02 RX ORDER — SODIUM CHLORIDE 9 MG/ML
INJECTION, SOLUTION INTRAVENOUS CONTINUOUS
Status: DISCONTINUED | OUTPATIENT
Start: 2020-08-02 | End: 2020-08-02

## 2020-08-02 RX ORDER — CEFTRIAXONE 2 G/1
2 INJECTION, POWDER, FOR SOLUTION INTRAMUSCULAR; INTRAVENOUS EVERY 24 HOURS
Status: DISCONTINUED | OUTPATIENT
Start: 2020-08-03 | End: 2020-08-02 | Stop reason: CLARIF

## 2020-08-02 RX ORDER — ACETAMINOPHEN 325 MG/1
650 TABLET ORAL EVERY 4 HOURS PRN
Status: DISCONTINUED | OUTPATIENT
Start: 2020-08-02 | End: 2020-08-04 | Stop reason: HOSPADM

## 2020-08-02 RX ADMIN — DICYCLOMINE HYDROCHLORIDE 10 MG: 10 CAPSULE ORAL at 20:02

## 2020-08-02 RX ADMIN — HEPARIN SODIUM 5000 UNITS: 5000 INJECTION INTRAVENOUS; SUBCUTANEOUS at 21:53

## 2020-08-02 RX ADMIN — HEPARIN SODIUM 5000 UNITS: 5000 INJECTION INTRAVENOUS; SUBCUTANEOUS at 06:39

## 2020-08-02 RX ADMIN — METOPROLOL TARTRATE 25 MG: 25 TABLET ORAL at 09:14

## 2020-08-02 RX ADMIN — HEPARIN SODIUM 5000 UNITS: 5000 INJECTION INTRAVENOUS; SUBCUTANEOUS at 15:00

## 2020-08-02 RX ADMIN — PANTOPRAZOLE SODIUM 40 MG: 40 TABLET, DELAYED RELEASE ORAL at 09:18

## 2020-08-02 RX ADMIN — LEVETIRACETAM 250 MG: 500 TABLET, FILM COATED ORAL at 20:02

## 2020-08-02 RX ADMIN — CEFTRIAXONE 2 G: 2 INJECTION, POWDER, FOR SOLUTION INTRAMUSCULAR; INTRAVENOUS at 09:14

## 2020-08-02 RX ADMIN — MIRTAZAPINE 15 MG: 15 TABLET, FILM COATED ORAL at 20:02

## 2020-08-02 RX ADMIN — ATORVASTATIN CALCIUM 40 MG: 40 TABLET, FILM COATED ORAL at 09:15

## 2020-08-02 RX ADMIN — LEVETIRACETAM 500 MG: 500 TABLET, FILM COATED ORAL at 09:15

## 2020-08-02 RX ADMIN — BUDESONIDE 6 MG: 3 CAPSULE ORAL at 09:15

## 2020-08-02 RX ADMIN — SODIUM CHLORIDE: 9 INJECTION, SOLUTION INTRAVENOUS at 13:20

## 2020-08-02 RX ADMIN — METOPROLOL TARTRATE 25 MG: 25 TABLET ORAL at 20:02

## 2020-08-02 RX ADMIN — DICYCLOMINE HYDROCHLORIDE 10 MG: 10 CAPSULE ORAL at 09:15

## 2020-08-02 RX ADMIN — QUETIAPINE FUMARATE 25 MG: 25 TABLET ORAL at 20:01

## 2020-08-02 RX ADMIN — DICYCLOMINE HYDROCHLORIDE 10 MG: 10 CAPSULE ORAL at 15:01

## 2020-08-02 RX ADMIN — AMLODIPINE BESYLATE 2.5 MG: 2.5 TABLET ORAL at 09:15

## 2020-08-02 ASSESSMENT — ENCOUNTER SYMPTOMS
COUGH: 0
NAUSEA: 1
VOMITING: 0
ABDOMINAL PAIN: 0
CHEST TIGHTNESS: 0
RHINORRHEA: 0
BACK PAIN: 0

## 2020-08-02 ASSESSMENT — PAIN SCALES - GENERAL
PAINLEVEL_OUTOF10: 0
PAINLEVEL_OUTOF10: 0
PAINLEVEL_OUTOF10: 6
PAINLEVEL_OUTOF10: 0

## 2020-08-02 ASSESSMENT — PAIN DESCRIPTION - PAIN TYPE: TYPE: ACUTE PAIN;CHRONIC PAIN

## 2020-08-02 ASSESSMENT — PAIN DESCRIPTION - LOCATION: LOCATION: GENERALIZED

## 2020-08-02 NOTE — ED PROVIDER NOTES
[address]  Pt Name: Bernice Spain  MRN: [id number]  [place] 6/21/1931  Date of evaluation: 8/1/2020  Provider: JEFFERY Solorzano [id number]       Chief Complaint   Patient presents with    Fatigue    Fever    Leg Pain     left lower       Nurses Notes reviewed and I agree except as noted in the HPI. HISTORY OF PRESENT ILLNESS    Bernice Spain is a 80 y.o. female whopresents to the emergency department from home with multiple complaints. She is fatigued, had a fever with a TMAX of 102 and left lower leg pain. The patient states that she bumped her leg on a walker and now has a wound. She has in home nursing/wound care taking care of that. Her appetite is decreased and she is overall ill feeling. HPI was provided by the patient. Triage notes and Nursing notes were reviewed by myself. Any discrepancies are addressed above. REVIEW OF SYSTEMS     Review of Systems   Constitutional: Positive for appetite change, chills, fatigue and fever. HENT: Negative for congestion, ear discharge, ear pain, postnasal drip and rhinorrhea. Respiratory: Negative for cough and chest tightness. Cardiovascular: Negative for chest pain and leg swelling. Gastrointestinal: Positive for nausea. Negative for abdominal pain and vomiting. Genitourinary: Positive for dysuria, frequency and urgency. Negative for difficulty urinating, enuresis, flank pain and hematuria. Musculoskeletal: Positive for arthralgias and myalgias. Negative for back pain and joint swelling. Skin: Positive for wound. Neurological: Negative for dizziness, light-headedness, numbness and headaches. Psychiatric/Behavioral: Negative for agitation, behavioral problems and confusion. All pertinent systems were reviewed and are negative unless indicated in the HPI.     PAST MEDICAL HISTORY    has a past medical history of Chronic pancreatitis ([facility] Fibromyalgia, GERD (gastroesophageal reflux disease), Hyperlipidemia, Hypertension, Low potassium syndrome, Osteoarthritis, Raynaud disease, and Seizures ([facility] SURGICAL HISTORY      has a past surgical history that includes Appendectomy; Cholecystectomy; eye surgery; hernia repair; Hysterectomy; Colonoscopy; and Upper gastrointestinal endoscopy. CURRENT MEDICATIONS       Current Discharge Medication List      CONTINUE these medications which have NOT CHANGED    Details   furosemide (LASIX) 20 MG tablet Take 20 mg by mouth 2 times daily      budesonide (ENTOCORT EC) 3 MG extended release capsule Take 6 mg by mouth every morning      famotidine (PEPCID) 40 MG tablet Take 40 mg by mouth nightly      levETIRAcetam (KEPPRA) 500 MG tablet Take 1 tablet by mouth 2 times daily  Qty: 180 tablet, Refills: 3      diphenoxylate-atropine (LOMOTIL) 2.5-0.025 MG per tablet Take 1 tablet by mouth 4 times daily as needed for Diarrhea. .      triamcinolone (KENALOG) 0.025 % ointment Apply topically 2 times daily Apply topically 2 times daily.       ZENPEP 28194 units CPEP Take by mouth 3 times daily (with meals)       KLOR-CON 10 10 MEQ extended release tablet Take 10 mEq by mouth 3 times daily Take 1 tablet in AM, 2 tablets at noon, and 1 tablet at supper      loperamide (IMODIUM) 2 MG capsule Take 2 mg by mouth 4 times daily as needed for Diarrhea      amLODIPine (NORVASC) 2.5 MG tablet Take 2.5 mg by mouth daily      Multiple Vitamin (MULTI VITAMIN PO) Take 1 tablet by mouth daily      pantoprazole (PROTONIX) 40 MG tablet Take 1 tablet by mouth 2 times daily  Qty: 30 tablet, Refills: 3      magnesium oxide (MAG-OX) 400 (240 MG) MG tablet Take 1 tablet by mouth daily  Qty: 30 tablet, Refills: 0      dicyclomine (BENTYL) 10 MG capsule Take 10 mg by mouth 3 times daily       atorvastatin (LIPITOR) 40 MG tablet Take 40 mg by mouth daily      metoprolol tartrate (LOPRESSOR) 25 MG tablet Take 25 mg by mouth 2 times daily      vitamin D3 (CHOLECALCIFEROL) 400 UNITS TABS tablet Take 400 Units by mouth daily      mirtazapine (REMERON) 15 MG tablet Take 15 mg by mouth nightly      QUEtiapine (SEROQUEL) 25 MG tablet Take 25 mg by mouth nightly       melatonin 3 MG TABS tablet Take 10 mg by mouth daily       cyanocobalamin 1000 MCG/ML injection Inject 1,000 mcg into the muscle every 30 days       ondansetron (ZOFRAN) 8 MG tablet Take 8 mg by mouth every 8 hours as needed for Nausea or Vomiting      Magic Mouthwash (MIRACLE MOUTHWASH) Swish and spit 5 mLs 4 times daily as needed for Irritation      meclizine (ANTIVERT) 25 MG tablet Take 25 mg by mouth 3 times daily as needed      Psyllium (METAMUCIL FIBER PO) Take by mouth             ALLERGIES     is allergic to amoxil [amoxicillin]; clindamycin/lincomycin; lyrica [pregabalin]; procardia [nifedipine]; and tenormin [atenolol]. FAMILY HISTORY     She indicated that the status of her mother is unknown. She indicated that the status of her father is unknown. She indicated that the status of her maternal grandmother is unknown. She indicated that the status of her maternal aunt is unknown.   family history includes Arthritis in her father; Cancer in her mother; Hearing Loss in her father; Heart Disease in her maternal grandmother; High Blood Pressure in her maternal aunt, maternal grandmother, and mother; Stroke in her father. SOCIAL HISTORY      reports that she has never smoked. She has never used smokeless tobacco. She reports that she does not drink alcohol or use drugs. PHYSICAL EXAM     INITIAL VITALS:  height is 4' 10\" (1.473 m) and weight is 89 lb 8 oz (40.6 kg). Her oral temperature is 97.9 °F (36.6 °C). Her blood pressure is 123/58 (abnormal) and her pulse is 76. Her respiration is 16 and oxygen saturation is 99%. Physical Exam  Vitals signs and nursing note reviewed. Constitutional:       General: She is not in acute distress. Appearance: She is well-developed.  She is ill-appearing. She is not diaphoretic. HENT:      Head: Normocephalic and atraumatic. Eyes:      General:         Right eye: No discharge. Left eye: No discharge. Conjunctiva/sclera: Conjunctivae normal.   Neck:      Musculoskeletal: Normal range of motion. Trachea: No tracheal deviation. Cardiovascular:      Rate and Rhythm: Normal rate and regular rhythm. Heart sounds: Normal heart sounds. No murmur. No gallop. Comments: Normal capillary refill  Pulmonary:      Effort: Pulmonary effort is normal. No respiratory distress. Breath sounds: Normal breath sounds. No stridor. Abdominal:      General: Bowel sounds are normal.      Palpations: Abdomen is soft. Musculoskeletal: Normal range of motion. General: No tenderness or deformity. Skin:     General: Skin is warm and dry. Capillary Refill: Capillary refill takes 2 to 3 seconds. Coloration: Skin is not pale. Findings: No erythema or rash. Comments: Wound to the left lower leg--dressing left in place as wound care put that on   Neurological:      Mental Status: She is alert and oriented to person, place, and time. Cranial Nerves: No cranial nerve deficit.    Psychiatric:         Behavior: Behavior normal.           DIFFERENTIAL DIAGNOSIS:   Including but not limited to UTI, infection, viral illness    DIAGNOSTIC RESULTS     EKG: AllEKG's are interpreted by the Emergency Department Physician who either signs or Co-signs this chart in the absence of a cardiologist.       EKG 12 Lead (Final result)    Component (Lab Inquiry)   Collection Time  Result Time  Ventricular Rate  Atrial Rate  P-R Interval  QRS Duration  Q-T Interval  QTc Calculation (Bazett)  P Axis  R Axis  T Axis    08/01/20 04:35:11  08/01/20 04:35:11  102  102  144  126  382  497  -4  81  -20    Previous Results    12/18/16 11:40:07  12/19/16 05:49:54  101  101  120  110  378  490  76  104  -61           Final result 1500)   cefTRIAXone (ROCEPHIN) 2 g IVPB in D5W 50ml minibag (0 g Intravenous Stopped 8/2/20 0944)   levETIRAcetam (KEPPRA) tablet 500 mg (500 mg Oral Given 8/2/20 0915)   levETIRAcetam (KEPPRA) tablet 250 mg (250 mg Oral Given 8/1/20 2158)   0.9 % sodium chloride infusion ( Intravenous New Bag 8/2/20 1320)   acetaminophen (TYLENOL) tablet 650 mg (has no administration in time range)   0.9 % sodium chloride bolus (0 mLs Intravenous Stopped 8/1/20 0716)   cefTRIAXone (ROCEPHIN) 1 g IVPB in 50 mL D5W minibag (0 g Intravenous Stopped 8/1/20 0716)   cefTRIAXone (ROCEPHIN) 1 g IVPB in 50 mL D5W minibag (0 g Intravenous Stopped 8/1/20 2252)           I have given the patient strict written and verbal instructions about care at home,follow-up, and signs and symptoms of worsening of condition and they did verbalize understanding. Patient was seen independently by myself. The Patient's final impression and disposition and plan was determined by myself. CRITICAL CARE:   none    CONSULTS:  None    PROCEDURES:  None    FINAL IMPRESSION      1. Urinary tract infection without hematuria, site unspecified    2.  ARIS (acute kidney injury) [facility])          DISPOSITION/PLAN   DISPOSITION Decision To Admit 08/01/2020 07:17:32 AM        PATIENT REFERRED TO:  Lizzette Jerry DO  [address] [place] [address] Road  [phone]            DISCHARGE MEDICATIONS:  Current Discharge Medication List          (Please note that portions of this note were completed with a voice recognition program.  Efforts were made to edit thedictations but occasionally words are mis-transcribed.)    Rosina Garcia, JEFFERY - CNP             JEFFERY Joe - PRATIBHA  08/02/20 3671

## 2020-08-02 NOTE — PROGRESS NOTES
Hospitalist Progress Note      Patient:  Willy Kayser    Unit/Bed:4A-08/008-A  YOB: 1931  MRN: [id number]   Acct: [de-identified]   PCP: Lizzette Jerry DO  Date of Admission: 8/1/2020    Assessment/Plan:    1. Acute cystitis   - Continue IV rocephin   - Await final culture    2. Bacteremia   - Blood cultures growing gram negative bacilli.    - On rocephin    3. Seizure disorder   -Continue Keppra as prescribed. 4.  Generalized weakness due to dehydration and underlying infectious etiology   -PT OT   -IV fluids    5. Chronic pancreatitis/chronic diarrhea   -Continue replacement home therapy. - Imodium PRN    6. ?Colitis   -Patient is on budesonide at home. Which will be continued. Unable to see any records regarding colonoscopy. Patient was seen by GI in the past    7. Depression   -Continue mirtazapine and Seroquel. 8.  Raynauds disease   -On calcium channel blocker    9. ARIS on CKD   - Continue IVF   - Monitor renal function closely avoid nephrotoxins. 10. LLE wound   -Patient states that she got a laceration while using a walker couple of weeks ago. She has been getting wound care at home. Did look at the wound with no active drainage. No signs of infection. Continue wound care. Chief Complaint: Weakness    Initial H and P:-      80 y.o. female who presents with weakness that has progressed over the last month. Patient states she fell a few weeks ago and was diagnosed with a concussion. Since her fall she has had persistent symptoms of fatigue and is having difficulty completing her daily activities. Tin Asa states having trouble getting herself out of bed and eating regularly because of her symptoms. Nothing helps her feel better and the feelings are exacerbated by any physical activity.  Additionally, the patient has a bandage on her left lower leg that she says is a cut she received from hitting her leg on a piece of furniture after a fall. She reports no associated symptoms to her weakness. Subjective (past 24 hours):     Patient states that she is feeling much better today. Denies any chest pain, shortness of breath, nausea, vomiting, diarrhea. She states she came in because she was feeling weak which is getting better now. Past medical history, family history, social history and allergies reviewed again and is unchanged since admission. ROS (12 point review of systems completed. Pertinent positives noted. Otherwise ROS is negative)     Medications:  Reviewed    Infusion Medications   Scheduled Medications    amLODIPine  2.5 mg Oral Daily    atorvastatin  40 mg Oral Daily    budesonide  6 mg Oral QAM    dicyclomine  10 mg Oral TID    metoprolol tartrate  25 mg Oral BID    mirtazapine  15 mg Oral Nightly    QUEtiapine  25 mg Oral Nightly    Pancrelipase (Lip-Prot-Amyl)  2 capsule Oral TID WC    pantoprazole  40 mg Oral Daily    heparin (porcine)  5,000 Units Subcutaneous 3 times per day    cefTRIAXone (ROCEPHIN) IV  2 g Intravenous Q24H    levETIRAcetam  500 mg Oral Daily    levETIRAcetam  250 mg Oral Nightly     PRN Meds: diphenoxylate-atropine, meclizine      Intake/Output Summary (Last 24 hours) at 8/2/2020 1243  Last data filed at 8/1/2020 2105  Gross per 24 hour   Intake 760 ml   Output --   Net 760 ml       Diet:  DIET GENERAL;    Exam:  /60   Pulse 72   Temp 98.2 °F (36.8 °C) (Oral)   Resp 16   Ht 4' 10\" (1.473 m)   Wt 89 lb 8 oz (40.6 kg)   SpO2 96%   BMI 18.71 kg/m²   General appearance: No apparent distress, appears stated age and cooperative. HEENT: Pupils equal, round, and reactive to light. Conjunctivae/corneas clear. Neck: Supple, with full range of motion. No jugular venous distention. Trachea midline. Respiratory:  Normal respiratory effort. Clear to auscultation, bilaterally without Rales/Wheezes/Rhonchi.   Cardiovascular: Regular rate and rhythm with normal S1/S2 without murmurs, rubs or gallops. Abdomen: Soft, non-tender, non-distended with normal bowel sounds. Musculoskeletal: passive and active ROM x 4 extremities. Skin: Skin color, texture, turgor normal.  No rashes or lesions. Neurologic:  Neurovascularly intact without any focal sensory/motor deficits. Cranial nerves: II-XII intact, grossly non-focal.  Psychiatric: Alert and oriented, thought content appropriate, normal insight  Capillary Refill: Brisk,< 3 seconds   Peripheral Pulses: +2 palpable, equal bilaterally     Labs:   Recent Labs     08/01/20 0517   WBC 12.6*   HGB 10.8*   HCT 33.4*        Recent Labs     08/01/20 0517      K 3.9      CO2 17*   BUN 40*   CREATININE 2.0*   CALCIUM 9.2     Recent Labs     08/01/20 0517   AST 34   ALT 38   BILIDIR <0.2   BILITOT 0.6   ALKPHOS 147*     No results for input(s): INR in the last 72 hours. No results for input(s): Dulcie Mean in the last 72 hours. Microbiology:    Blood culture #1:   Lab Results   Component Value Date    BC No growth-preliminary  08/01/2020       Blood culture #2:No results found for: Agustin Marcelo    Organism:  Lab Results   Component Value Date    ORG gram negative bacilli 08/01/2020       No results found for: LABGRAM    MRSA culture only:No results found for: [address] [place] [address]    Urine culture: No results found for: LABURIN    Respiratory culture: No results found for: CULTRESP    Aerobic and Anaerobic :  No results found for: LABAERO  No results found for: LABANAE    Urinalysis:      Lab Results   Component Value Date    NITRU NEGATIVE 08/01/2020    WBCUA > 100 08/01/2020    BACTERIA MANY 08/01/2020    RBCUA 0-2 08/01/2020    BLOODU SMALL 08/01/2020    GLUCOSEU NEGATIVE 08/01/2020       Radiology:  XR CHEST PORTABLE   Final Result   1. Mild infrahilar atelectasis or minimal bronchitis changes. **This report has been created using voice recognition software.  It may contain minor errors which are inherent in voice recognition technology. **      Final report electronically signed by Dr. Michelle Andrea on 8/1/2020 5:24 AM        Xr Chest Portable    Result Date: 8/1/2020  PROCEDURE: XR CHEST PORTABLE CLINICAL INFORMATION: fatigue. COMPARISON: December 18, 2016 TECHNIQUE: AP upright view of the chest. FINDINGS: The heart and mediastinum remain within normal limits. There is redemonstration of mildly coarse markings. Current exam shows slight increased infrahilar atelectasis versus minimal filtrate changes. There is no vascular congestion effusion or pneumothorax. The skeleton is negative for acute appearing pathology     1. Mild infrahilar atelectasis or minimal bronchitis changes. **This report has been created using voice recognition software. It may contain minor errors which are inherent in voice recognition technology. ** Final report electronically signed by Dr. Michelle Andrea on 8/1/2020 5:24 AM      Electronically signed by Tucker Andre MD on 8/2/2020 at 12:43 PM

## 2020-08-03 LAB
ANION GAP SERPL CALCULATED.3IONS-SCNC: 14 MEQ/L (ref 8–16)
BUN BLDV-MCNC: 38 MG/DL (ref 7–22)
CALCIUM SERPL-MCNC: 8.3 MG/DL (ref 8.5–10.5)
CHLORIDE BLD-SCNC: 107 MEQ/L (ref 98–111)
CO2: 19 MEQ/L (ref 23–33)
CREAT SERPL-MCNC: 1.3 MG/DL (ref 0.4–1.2)
ERYTHROCYTE [DISTWIDTH] IN BLOOD BY AUTOMATED COUNT: 13.7 % (ref 11.5–14.5)
ERYTHROCYTE [DISTWIDTH] IN BLOOD BY AUTOMATED COUNT: 46.9 FL (ref 35–45)
GFR SERPL CREATININE-BSD FRML MDRD: 39 ML/MIN/1.73M2
GLUCOSE BLD-MCNC: 106 MG/DL (ref 70–108)
HCT VFR BLD CALC: 30.2 % (ref 37–47)
HEMOGLOBIN: 9.7 GM/DL (ref 12–16)
MCH RBC QN AUTO: 30 PG (ref 26–33)
MCHC RBC AUTO-ENTMCNC: 32.1 GM/DL (ref 32.2–35.5)
MCV RBC AUTO: 93.5 FL (ref 81–99)
ORGANISM: ABNORMAL
ORGANISM: ABNORMAL
PLATELET # BLD: 181 THOU/MM3 (ref 130–400)
PMV BLD AUTO: 9.6 FL (ref 9.4–12.4)
POTASSIUM SERPL-SCNC: 3.2 MEQ/L (ref 3.5–5.2)
RBC # BLD: 3.23 MILL/MM3 (ref 4.2–5.4)
SODIUM BLD-SCNC: 140 MEQ/L (ref 135–145)
URINE CULTURE REFLEX: ABNORMAL
WBC # BLD: 12 THOU/MM3 (ref 4.8–10.8)

## 2020-08-03 PROCEDURE — 99232 SBSQ HOSP IP/OBS MODERATE 35: CPT | Performed by: INTERNAL MEDICINE

## 2020-08-03 PROCEDURE — 6370000000 HC RX 637 (ALT 250 FOR IP): Performed by: INTERNAL MEDICINE

## 2020-08-03 PROCEDURE — 2060000000 HC ICU INTERMEDIATE R&B

## 2020-08-03 PROCEDURE — 6370000000 HC RX 637 (ALT 250 FOR IP): Performed by: PHYSICIAN ASSISTANT

## 2020-08-03 PROCEDURE — 6370000000 HC RX 637 (ALT 250 FOR IP): Performed by: STUDENT IN AN ORGANIZED HEALTH CARE EDUCATION/TRAINING PROGRAM

## 2020-08-03 PROCEDURE — 80048 BASIC METABOLIC PNL TOTAL CA: CPT

## 2020-08-03 PROCEDURE — 6360000002 HC RX W HCPCS: Performed by: PHYSICIAN ASSISTANT

## 2020-08-03 PROCEDURE — 87040 BLOOD CULTURE FOR BACTERIA: CPT

## 2020-08-03 PROCEDURE — 2580000003 HC RX 258: Performed by: PHYSICIAN ASSISTANT

## 2020-08-03 PROCEDURE — 6360000002 HC RX W HCPCS: Performed by: INTERNAL MEDICINE

## 2020-08-03 PROCEDURE — 36415 COLL VENOUS BLD VENIPUNCTURE: CPT

## 2020-08-03 PROCEDURE — 85027 COMPLETE CBC AUTOMATED: CPT

## 2020-08-03 PROCEDURE — 2580000003 HC RX 258: Performed by: INTERNAL MEDICINE

## 2020-08-03 RX ORDER — SODIUM CHLORIDE 0.9 % (FLUSH) 0.9 %
10 SYRINGE (ML) INJECTION PRN
Status: DISCONTINUED | OUTPATIENT
Start: 2020-08-03 | End: 2020-08-04 | Stop reason: HOSPADM

## 2020-08-03 RX ORDER — LIDOCAINE HYDROCHLORIDE 10 MG/ML
5 INJECTION, SOLUTION EPIDURAL; INFILTRATION; INTRACAUDAL; PERINEURAL ONCE
Status: DISCONTINUED | OUTPATIENT
Start: 2020-08-03 | End: 2020-08-04 | Stop reason: HOSPADM

## 2020-08-03 RX ORDER — POTASSIUM CHLORIDE 750 MG/1
40 TABLET, FILM COATED, EXTENDED RELEASE ORAL ONCE
Status: COMPLETED | OUTPATIENT
Start: 2020-08-03 | End: 2020-08-03

## 2020-08-03 RX ORDER — SODIUM CHLORIDE 0.9 % (FLUSH) 0.9 %
10 SYRINGE (ML) INJECTION EVERY 12 HOURS SCHEDULED
Status: DISCONTINUED | OUTPATIENT
Start: 2020-08-03 | End: 2020-08-04 | Stop reason: HOSPADM

## 2020-08-03 RX ORDER — POTASSIUM CHLORIDE 7.45 MG/ML
10 INJECTION INTRAVENOUS ONCE
Status: DISCONTINUED | OUTPATIENT
Start: 2020-08-03 | End: 2020-08-03 | Stop reason: ALTCHOICE

## 2020-08-03 RX ADMIN — DICYCLOMINE HYDROCHLORIDE 10 MG: 10 CAPSULE ORAL at 16:03

## 2020-08-03 RX ADMIN — ACETAMINOPHEN 650 MG: 325 TABLET ORAL at 16:11

## 2020-08-03 RX ADMIN — DICYCLOMINE HYDROCHLORIDE 10 MG: 10 CAPSULE ORAL at 20:54

## 2020-08-03 RX ADMIN — QUETIAPINE FUMARATE 25 MG: 25 TABLET ORAL at 20:54

## 2020-08-03 RX ADMIN — POTASSIUM CHLORIDE 40 MEQ: 750 TABLET, FILM COATED, EXTENDED RELEASE ORAL at 10:32

## 2020-08-03 RX ADMIN — LEVETIRACETAM 500 MG: 500 TABLET, FILM COATED ORAL at 10:31

## 2020-08-03 RX ADMIN — WATER 1000 MG: 1 INJECTION INTRAMUSCULAR; INTRAVENOUS; SUBCUTANEOUS at 05:51

## 2020-08-03 RX ADMIN — METOPROLOL TARTRATE 25 MG: 25 TABLET ORAL at 10:31

## 2020-08-03 RX ADMIN — HEPARIN SODIUM 5000 UNITS: 5000 INJECTION INTRAVENOUS; SUBCUTANEOUS at 16:04

## 2020-08-03 RX ADMIN — Medication 10 ML: at 10:32

## 2020-08-03 RX ADMIN — ATORVASTATIN CALCIUM 40 MG: 40 TABLET, FILM COATED ORAL at 10:31

## 2020-08-03 RX ADMIN — PANTOPRAZOLE SODIUM 40 MG: 40 TABLET, DELAYED RELEASE ORAL at 10:33

## 2020-08-03 RX ADMIN — MIRTAZAPINE 15 MG: 15 TABLET, FILM COATED ORAL at 20:54

## 2020-08-03 RX ADMIN — HEPARIN SODIUM 5000 UNITS: 5000 INJECTION INTRAVENOUS; SUBCUTANEOUS at 21:12

## 2020-08-03 RX ADMIN — BUDESONIDE 6 MG: 3 CAPSULE ORAL at 10:31

## 2020-08-03 RX ADMIN — LEVETIRACETAM 250 MG: 500 TABLET, FILM COATED ORAL at 20:54

## 2020-08-03 RX ADMIN — Medication 10 ML: at 20:50

## 2020-08-03 RX ADMIN — AMLODIPINE BESYLATE 2.5 MG: 2.5 TABLET ORAL at 10:31

## 2020-08-03 RX ADMIN — HEPARIN SODIUM 5000 UNITS: 5000 INJECTION INTRAVENOUS; SUBCUTANEOUS at 05:52

## 2020-08-03 RX ADMIN — METOPROLOL TARTRATE 25 MG: 25 TABLET ORAL at 20:54

## 2020-08-03 RX ADMIN — DICYCLOMINE HYDROCHLORIDE 10 MG: 10 CAPSULE ORAL at 10:31

## 2020-08-03 ASSESSMENT — PAIN SCALES - GENERAL
PAINLEVEL_OUTOF10: 0
PAINLEVEL_OUTOF10: 3
PAINLEVEL_OUTOF10: 0
PAINLEVEL_OUTOF10: 0

## 2020-08-03 ASSESSMENT — PAIN DESCRIPTION - LOCATION: LOCATION: HEAD

## 2020-08-03 NOTE — PROGRESS NOTES
[id number]-  This RN arrived for PICC placement per order. This RN assessed for vessels on bilateral arms. Unable to visualize bilateral cephalic vein with use of ultrasound, Bilateral  basilic veins determined to be insufficient in size for PICC line placement after visualization with ultrasound. Bilateral brachial veins in close proximity of brachial vein. Determined to be unsafe for attempting PICC line placement.

## 2020-08-03 NOTE — CARE COORDINATION
8/3/20, 12:53 PM EDT  DISCHARGE PLANNING EVALUATION:    Sonja Marie       Admitted from: ED 8/1/2020/ [facility] day: 2   Location: [facility]08/008-A Reason for admit: ARIS (acute kidney injury) ([facility] [address] [N17.9] Status: IP  Admit order signed?: yes  PMH:  has a past medical history of Chronic pancreatitis ([facility] [address] Fibromyalgia, GERD (gastroesophageal reflux disease), Hyperlipidemia, Hypertension, Low potassium syndrome, Osteoarthritis, Raynaud disease, and Seizures ([facility] [address] Procedure: none  Pertinent abnormal Imaging:CXR   Impression:          1. Mild infrahilar atelectasis or minimal bronchitis changes. Medications:  Scheduled Meds:   lidocaine 1 % injection  5 mL Intradermal Once    sodium chloride flush  10 mL Intravenous 2 times per day    cefTRIAXone (ROCEPHIN) 350 mg/mL IM syringe (PED-MERCEDES)  1,000 mg Intramuscular Q24H    And    cefTRIAXone (ROCEPHIN) 350 mg/mL IM syringe (PED-MERCEDES)  1,000 mg Intramuscular Q24H    amLODIPine  2.5 mg Oral Daily    atorvastatin  40 mg Oral Daily    budesonide  6 mg Oral QAM    dicyclomine  10 mg Oral TID    metoprolol tartrate  25 mg Oral BID    mirtazapine  15 mg Oral Nightly    QUEtiapine  25 mg Oral Nightly    Pancrelipase (Lip-Prot-Amyl)  2 capsule Oral TID WC    pantoprazole  40 mg Oral Daily    heparin (porcine)  5,000 Units Subcutaneous 3 times per day    levETIRAcetam  500 mg Oral Daily    levETIRAcetam  250 mg Oral Nightly     Continuous Infusions:   Pertinent Info/Orders/Treatment Plan:  Urine (+), WBC 12.0, blood culture (+), COVID (-), procalcitonin 17.24, IV antibiotics, telemetry, PICC . Diet: DIET GENERAL;   Smoking status:  reports that she has never smoked.  She has never used smokeless tobacco.   PCP: Kaiser Jerry DO  Readmission 30 days or less: no  Readmission Risk Score: 25%    Discharge Planning Evaluation  Current Residence:  Private Residence  Living Arrangements:  Family Members, Children   Support Systems:

## 2020-08-03 NOTE — PROGRESS NOTES
Present to pt room for consult of left lower extremity wound. Pt sitting in bed with visitor at side. Removed dressing to left lower leg. Pt had vaseline gauze, no stick pad and kerlix in place. Pt states she hit her left lower leg on a sharp piece that stuck out from walker and since then has seen PCP for wound care. Area was sutured and she states PCP was doing a betadine dressing with ACE wrap. Cleansed wound with normal saline and gauze. Pat dry with clean gauze. Pt has sutures from 4-6 oclock wound area that are not approximating wound and very loose. States she was supposed to f/u with PCP tomorrow for suture removal however appt got cancelled. Applied betadine to old sutures and removed with staple remover kit. Pt tolerated well. Cleansed wound again with normal saline and gauze. Pat dry with clean gauze. Applied opticel ag to wound bed followed by silicone bordered foam dressing. [place] [facility] [place] or [place]. Have staff change three times weekly (M-W-F) and as needed. Pt has strong pedal pulses to LLE and no edema noted. Pt states she had swelling to leg before wound started and PCP put her on a diuretic. Since then she has had no BLE edema. Would hold off on compression therapy at this time. Pt is thin and appears to have small DTPI to left shin present on admission likely from ACE wrap. Bed in low, call light in reach. Wound type: Left anterior lower leg traumatic wound  Wound size: 3.5cm x 1cm x 0.3cm  Undermining or Tunneling: none  Wound assessment: 80% yellow, 20% red  Drainage amount: small  Drainage description: serosanguinous  Odor: none  Margins: attached  Brina wound: macerated  Exposed structure: none    Thank you for allowing us to participate in the care of your patient. TIME   Wound/ostomy individual minutes  Time In: 1220  Time Out: 1250  Minutes: 30  Time does not include documentation.

## 2020-08-03 NOTE — PROGRESS NOTES
Physician Progress Note      Ricci Rodriges  CSN #:                  [id number]  :                       1931  ADMIT DATE:       2020 4:21 AM  DISCH DATE:  RESPONDING  PROVIDER #:        Rusty Jones MD          QUERY TEXT:    Pt admitted with UTI noted to meet SIRS criteria. If possible, please document   in the progress notes and discharge summary if you are evaluating and /or   treating any of the following: The medical record reflects the following:  Risk Factors: UTI  Clinical Indicators: WBC 12.6, PCT 17.24, lactic 0.8, temp 98.6, , RR 22  Treatment: Labs, imaging, Rocephin  Options provided:  -- Sepsis, present on admission  -- Sepsis, now resolved  -- Sepsis, not present on admission,  -- No Sepsis, localized infection only  -- Sepsis was ruled out  -- Other - I will add my own diagnosis  -- Disagree - Clinically unable to determine / Unknown  -- Refer to Clinical Documentation Reviewer    PROVIDER RESPONSE TEXT:    This patient has UTI only, patient is not septic.     Query created by: Yanci Oliveira on 8/3/2020 12:12 PM      Electronically signed by:  Rusty Jones MD 8/3/2020 1:23 PM

## 2020-08-03 NOTE — PLAN OF CARE
Problem: Falls - Risk of:  Goal: Will remain free from falls  Description: Will remain free from falls  Outcome: Ongoing  Note: Patient remains free from falls this shift. Fall precautions in place with bed exit alarmed. Fall sign posted and fall armband in place. Nonskid footwear used with transferring. Educated patient to use call light when in need of staff assistance with transferring, ambulating, and other activities of daily living. Patient appropriately uses call light this shift. Problem: Falls - Risk of:  Goal: Absence of physical injury  Description: Absence of physical injury  Outcome: Ongoing     Problem: Skin Integrity:  Goal: Will show no infection signs and symptoms  Description: Will show no infection signs and symptoms  Outcome: Ongoing  Note: Patient shows no new signs of skin breakdown this shift. Patient turns self with pillow support. Patient remains afebrile. Patient receiving IM Rocephin every 24 hours. Problem: Skin Integrity:  Goal: Absence of new skin breakdown  Description: Absence of new skin breakdown  Outcome: Ongoing  Note: Patient has no new skin breakdown this shift. Patient turns self with pillow support. Abrasion/scab present on left shin, open to air. No redness, warmth, or discharge present. Skin warm, dry, intact. Problem: Pain:  Goal: Pain level will decrease  Description: Pain level will decrease  Outcome: Ongoing  Note: Patient reports pain as a 0 on a 0-10 scale this shift. Patient's stated pain goal is no pain. Non-pharmacological pain interventions include rest and repositioning. Pharmacological pain interventions on board per physician's order.         Problem: Pain:  Goal: Control of acute pain  Description: Control of acute pain  Outcome: Ongoing     Problem: Pain:  Goal: Control of chronic pain  Description: Control of chronic pain  Outcome: Ongoing     Problem: Discharge Planning:  Goal: Discharged to appropriate level of care  Description: Discharged to appropriate level of care  Outcome: Ongoing  Note: Patient educated that discharge plan is still in progress. Patient from home alone. Problem: Urinary Elimination:  Goal: Signs and symptoms of infection will decrease  Description: Signs and symptoms of infection will decrease  Outcome: Ongoing  Note: Patient shows no signs and symptoms of UTI at this time. Patient afebrile. Receiving IV Rocephin every 24 hours. Problem: Mobility - Impaired:  Goal: Mobility will improve  Description: Mobility will improve  Outcome: Ongoing  Note: Patient up with 1x staff assist. Patient uses cane with ambulation. Patient tolerates ambulation well. Care plan reviewed with patient. Patient verbalizes understanding of the plan of care and contributes to goal setting.

## 2020-08-03 NOTE — CARE COORDINATION
DISCHARGE/PLANNING EVALUATION  8/3/20, 12:18 PM EDT    Reason for Referral: Discharge planning  Mental Status: Answered all questions appropriately. Decision Making: Son is helping with some decisions. Family/Social/Home Environment: Lives at home alone. She has 3 children. Her son Ej Schultz and daughter-in-law Santy Salazar are the only ones in the area and are very supportive. Current Services including food security, transportation and housekeeping: Current with [facility] for nursing. Verified services with Faith Orourke at the agency. Jd Voss has a cleaning person and someone who does the takes care of her yard. She does her laundry and cooking. She gets most of her food from [employer]. Her son Ej Schultz takes care of any shopping that needs done. Daughter-in-law Santy Salazar is an RN. She sets up all of [employer] medications. Current Equipment:Rollator walker, cane, shower seat  Payment Source:Medicare and [employer]  Concerns or Barriers to Discharge: None  Post acute provider list with quality measures, geographic area and applicable managed care information provided. Questions regarding selection process answered:ECF list offered. Teach Back Method used with Jd Voss and her son Ej Schultz regarding care plan and discharge planning. Patient and son  verbalize understanding of the plan of care and contribute to goal setting. Patient goals, treatment preferences and discharge plan: Jd Voss would like to go to an ECF at discharge. Her first choice is [facility]. STEPHANY left a message for Aster Bowles in admissions. Electronically signed by SELENE Mcclain on 8/3/2020 at 12:18 PM    Update 12:38pm: STEPHANY spoke with Aster Bowles at [facility]. They do have beds available. The patient would be in a private room. She told SW that they do not have any active COVID cases. STEPHANY made family aware as this was a concern.   Faxed information per Jennifer's request.      Update 1:47pm: Spoke with Grace Oliveira at [facility]. They are able to accept at discharge. They will need a COVID test with in 24 hours of discharge.

## 2020-08-03 NOTE — FLOWSHEET NOTE
08/02/20 2237   Provider Notification   Reason for Communication Review case  (Loss of IV access)   Provider Name Sebastian Juarez   Provider Notification Advance Practice Clinician (CNS, NP, CNM, CRNA, PA)   Method of Communication Secure Message   Response See orders   Notification Time 2237     Notified Nikita Farah of loss of IV access. See new orders for IM Rocephin and discontinuation of IV fluids. Clavicle fracture

## 2020-08-03 NOTE — PROGRESS NOTES
Hospitalist Progress Note      Patient:  Mannie Diaz    Unit/Bed:4A-08/008-A  YOB: 1931  MRN: [id number]   Acct: [de-identified]   PCP: Effie Jerry DO  Date of Admission: 8/1/2020    Assessment/Plan:    1. E.Coli cystitis   - No sepsis on admission   - Continue IV rocephin    2. Bacteremia   - Blood cultures grew E.Coli. Will reculture today. If prelim negative tomorrow, will d/c patient. Patient has been hard to get IV access, considered PICC line for 2 weeks of antibiotics but will check PO sensitivities. Called micro to release sensitivities. Likely d/c on Cipro or Keflex to complete 2 week course of antibiotics from first negative culture, if sensitive. - On rocephin IM due to IV access issues. 3.  Seizure disorder   -Continue Keppra as prescribed. 4.  Generalized weakness due to dehydration and underlying infectious etiology   -PT OT   -IV fluids    5. Chronic pancreatitis/chronic diarrhea   -Continue replacement home therapy. - Imodium PRN    6. ?Colitis   -Patient is on budesonide at home. Which will be continued. Unable to see any records regarding colonoscopy. Patient was seen by GI in the past    7. Depression   -Continue mirtazapine and Seroquel. 8.  Raynauds disease   -On calcium channel blocker    9. ARIS on CKD   - Continue IVF   - Monitor renal function closely avoid nephrotoxins. 10. LLE wound   -Patient states that she got a laceration while using a walker couple of weeks ago. She has been getting wound care at home. Did look at the wound with no active drainage. No signs of infection. Continue wound care. Chief Complaint: Weakness    Initial H and P:-      80 y.o. female who presents with weakness that has progressed over the last month. Patient states she fell a few weeks ago and was diagnosed with a concussion.  Since her fall she has had persistent symptoms of fatigue and is having difficulty completing her daily activities. Dion Majano states having trouble getting herself out of bed and eating regularly because of her symptoms. Nothing helps her feel better and the feelings are exacerbated by any physical activity. Additionally, the patient has a bandage on her left lower leg that she says is a cut she received from hitting her leg on a piece of furniture after a fall. She reports no associated symptoms to her weakness. Subjective (past 24 hours):     Patient states that she is feeling much better today. Denies any chest pain, shortness of breath, nausea, vomiting, diarrhea. She states she came in because she was feeling weak which is getting better now. Past medical history, family history, social history and allergies reviewed again and is unchanged since admission. ROS (12 point review of systems completed. Pertinent positives noted.  Otherwise ROS is negative)     Medications:  Reviewed    Infusion Medications   Scheduled Medications    lidocaine 1 % injection  5 mL Intradermal Once    sodium chloride flush  10 mL Intravenous 2 times per day    cefTRIAXone (ROCEPHIN) 350 mg/mL IM syringe (PED-MERCEDES)  1,000 mg Intramuscular Q24H    And    cefTRIAXone (ROCEPHIN) 350 mg/mL IM syringe (PED-MERCEDES)  1,000 mg Intramuscular Q24H    amLODIPine  2.5 mg Oral Daily    atorvastatin  40 mg Oral Daily    budesonide  6 mg Oral QAM    dicyclomine  10 mg Oral TID    metoprolol tartrate  25 mg Oral BID    mirtazapine  15 mg Oral Nightly    QUEtiapine  25 mg Oral Nightly    Pancrelipase (Lip-Prot-Amyl)  2 capsule Oral TID WC    pantoprazole  40 mg Oral Daily    heparin (porcine)  5,000 Units Subcutaneous 3 times per day    levETIRAcetam  500 mg Oral Daily    levETIRAcetam  250 mg Oral Nightly     PRN Meds: sodium chloride flush, acetaminophen, diphenoxylate-atropine, meclizine      Intake/Output Summary (Last 24 hours) at 8/3/2020 1502  Last data filed at 8/3/2020 0620  Gross per 24 hour   Intake 200 ml   Output --   Net 200 ml       Diet:  DIET GENERAL;    Exam:  /66   Pulse 71   Temp 98.2 °F (36.8 °C) (Oral)   Resp 16   Ht 4' 10\" (1.473 m)   Wt 105 lb 9.6 oz (47.9 kg)   SpO2 99%   BMI 22.07 kg/m²   General appearance: No apparent distress, appears stated age and cooperative. HEENT: Pupils equal, round, and reactive to light. Conjunctivae/corneas clear. Neck: Supple, with full range of motion. No jugular venous distention. Trachea midline. Respiratory:  Normal respiratory effort. Clear to auscultation, bilaterally without Rales/Wheezes/Rhonchi. Cardiovascular: Regular rate and rhythm with normal S1/S2 without murmurs, rubs or gallops. Abdomen: Soft, non-tender, non-distended with normal bowel sounds. Musculoskeletal: passive and active ROM x 4 extremities. Skin: Skin color, texture, turgor normal.  No rashes or lesions. Neurologic:  Neurovascularly intact without any focal sensory/motor deficits. Cranial nerves: II-XII intact, grossly non-focal.  Psychiatric: Alert and oriented, thought content appropriate, normal insight  Capillary Refill: Brisk,< 3 seconds   Peripheral Pulses: +2 palpable, equal bilaterally     Labs:   Recent Labs     08/01/20 0517 08/03/20  0351   WBC 12.6* 12.0*   HGB 10.8* 9.7*   HCT 33.4* 30.2*    181     Recent Labs     08/01/20  0517 08/03/20  0351    140   K 3.9 3.2*    107   CO2 17* 19*   BUN 40* 38*   CREATININE 2.0* 1.3*   CALCIUM 9.2 8.3*     Recent Labs     08/01/20  0517   AST 34   ALT 38   BILIDIR <0.2   BILITOT 0.6   ALKPHOS 147*     No results for input(s): INR in the last 72 hours. No results for input(s): Salvador Shown in the last 72 hours.     Microbiology:    Blood culture #1:   Lab Results   Component Value Date    BC No growth-preliminary  08/01/2020       Blood culture #2:No results found for: BLOODCULT2    Organism:  Lab Results   Component Value Date    ORG Escherichia coli 08/01/2020       No results found for: LABGRAM    MRSA culture only:No results found for: [facility]    Urine culture: No results found for: LABURIN    Respiratory culture: No results found for: CULTRESP    Aerobic and Anaerobic :  No results found for: LABAERO  No results found for: LABANAE    Urinalysis:      Lab Results   Component Value Date    NITRU NEGATIVE 08/01/2020    WBCUA > 100 08/01/2020    BACTERIA MANY 08/01/2020    RBCUA 0-2 08/01/2020    BLOODU SMALL 08/01/2020    GLUCOSEU NEGATIVE 08/01/2020       Radiology:  XR CHEST PORTABLE   Final Result   1. Mild infrahilar atelectasis or minimal bronchitis changes. **This report has been created using voice recognition software. It may contain minor errors which are inherent in voice recognition technology. **      Final report electronically signed by Dr. Jasmine Colvin on 8/1/2020 5:24 AM        Xr Chest Portable    Result Date: 8/1/2020  PROCEDURE: XR CHEST PORTABLE CLINICAL INFORMATION: fatigue. COMPARISON: December 18, 2016 TECHNIQUE: AP upright view of the chest. FINDINGS: The heart and mediastinum remain within normal limits. There is redemonstration of mildly coarse markings. Current exam shows slight increased infrahilar atelectasis versus minimal filtrate changes. There is no vascular congestion effusion or pneumothorax. The skeleton is negative for acute appearing pathology     1. Mild infrahilar atelectasis or minimal bronchitis changes. **This report has been created using voice recognition software. It may contain minor errors which are inherent in voice recognition technology. ** Final report electronically signed by Dr. Jasmine Colvin on 8/1/2020 5:24 AM      Electronically signed by Sharonda Mota MD on 8/3/2020 at 3:02 PM

## 2020-08-04 VITALS
HEIGHT: 58 IN | BODY MASS INDEX: 19.92 KG/M2 | OXYGEN SATURATION: 98 % | TEMPERATURE: 98 F | HEART RATE: 78 BPM | SYSTOLIC BLOOD PRESSURE: 112 MMHG | DIASTOLIC BLOOD PRESSURE: 62 MMHG | WEIGHT: 94.9 LBS | RESPIRATION RATE: 16 BRPM

## 2020-08-04 LAB
ANION GAP SERPL CALCULATED.3IONS-SCNC: 13 MEQ/L (ref 8–16)
BUN BLDV-MCNC: 33 MG/DL (ref 7–22)
CALCIUM SERPL-MCNC: 8.5 MG/DL (ref 8.5–10.5)
CHLORIDE BLD-SCNC: 107 MEQ/L (ref 98–111)
CO2: 18 MEQ/L (ref 23–33)
CREAT SERPL-MCNC: 1.2 MG/DL (ref 0.4–1.2)
ERYTHROCYTE [DISTWIDTH] IN BLOOD BY AUTOMATED COUNT: 14 % (ref 11.5–14.5)
ERYTHROCYTE [DISTWIDTH] IN BLOOD BY AUTOMATED COUNT: 48.6 FL (ref 35–45)
GFR SERPL CREATININE-BSD FRML MDRD: 42 ML/MIN/1.73M2
GLUCOSE BLD-MCNC: 98 MG/DL (ref 70–108)
HCT VFR BLD CALC: 29.7 % (ref 37–47)
HEMOGLOBIN: 9.8 GM/DL (ref 12–16)
MCH RBC QN AUTO: 30.9 PG (ref 26–33)
MCHC RBC AUTO-ENTMCNC: 33 GM/DL (ref 32.2–35.5)
MCV RBC AUTO: 93.7 FL (ref 81–99)
PLATELET # BLD: 190 THOU/MM3 (ref 130–400)
PMV BLD AUTO: 9.2 FL (ref 9.4–12.4)
POTASSIUM SERPL-SCNC: 3.9 MEQ/L (ref 3.5–5.2)
RBC # BLD: 3.17 MILL/MM3 (ref 4.2–5.4)
SARS-COV-2, NAAT: NOT DETECTED
SODIUM BLD-SCNC: 138 MEQ/L (ref 135–145)
WBC # BLD: 11.1 THOU/MM3 (ref 4.8–10.8)

## 2020-08-04 PROCEDURE — 85027 COMPLETE CBC AUTOMATED: CPT

## 2020-08-04 PROCEDURE — 2580000003 HC RX 258: Performed by: INTERNAL MEDICINE

## 2020-08-04 PROCEDURE — 99239 HOSP IP/OBS DSCHRG MGMT >30: CPT | Performed by: PHYSICIAN ASSISTANT

## 2020-08-04 PROCEDURE — 6360000002 HC RX W HCPCS: Performed by: PHYSICIAN ASSISTANT

## 2020-08-04 PROCEDURE — U0002 COVID-19 LAB TEST NON-CDC: HCPCS

## 2020-08-04 PROCEDURE — 36415 COLL VENOUS BLD VENIPUNCTURE: CPT

## 2020-08-04 PROCEDURE — 6370000000 HC RX 637 (ALT 250 FOR IP): Performed by: PHYSICIAN ASSISTANT

## 2020-08-04 PROCEDURE — 2580000003 HC RX 258: Performed by: PHYSICIAN ASSISTANT

## 2020-08-04 PROCEDURE — 6370000000 HC RX 637 (ALT 250 FOR IP): Performed by: STUDENT IN AN ORGANIZED HEALTH CARE EDUCATION/TRAINING PROGRAM

## 2020-08-04 PROCEDURE — 6360000002 HC RX W HCPCS: Performed by: INTERNAL MEDICINE

## 2020-08-04 PROCEDURE — 80048 BASIC METABOLIC PNL TOTAL CA: CPT

## 2020-08-04 RX ORDER — CIPROFLOXACIN 500 MG/1
500 TABLET, FILM COATED ORAL 2 TIMES DAILY
Qty: 22 TABLET | Refills: 0
Start: 2020-08-04 | End: 2020-08-15

## 2020-08-04 RX ORDER — FUROSEMIDE 20 MG/1
20 TABLET ORAL DAILY PRN
Qty: 60 TABLET | Refills: 0 | Status: SHIPPED
Start: 2020-08-04

## 2020-08-04 RX ADMIN — CEFTRIAXONE 2 G: 2 INJECTION, POWDER, FOR SOLUTION INTRAMUSCULAR; INTRAVENOUS at 05:48

## 2020-08-04 RX ADMIN — DICYCLOMINE HYDROCHLORIDE 10 MG: 10 CAPSULE ORAL at 09:23

## 2020-08-04 RX ADMIN — PANTOPRAZOLE SODIUM 40 MG: 40 TABLET, DELAYED RELEASE ORAL at 09:26

## 2020-08-04 RX ADMIN — Medication 10 ML: at 09:25

## 2020-08-04 RX ADMIN — ATORVASTATIN CALCIUM 40 MG: 40 TABLET, FILM COATED ORAL at 09:23

## 2020-08-04 RX ADMIN — Medication 10 ML: at 05:51

## 2020-08-04 RX ADMIN — AMLODIPINE BESYLATE 2.5 MG: 2.5 TABLET ORAL at 09:23

## 2020-08-04 RX ADMIN — Medication 10 ML: at 06:21

## 2020-08-04 RX ADMIN — METOPROLOL TARTRATE 25 MG: 25 TABLET ORAL at 09:23

## 2020-08-04 RX ADMIN — HEPARIN SODIUM 5000 UNITS: 5000 INJECTION INTRAVENOUS; SUBCUTANEOUS at 05:53

## 2020-08-04 RX ADMIN — DICYCLOMINE HYDROCHLORIDE 10 MG: 10 CAPSULE ORAL at 13:04

## 2020-08-04 RX ADMIN — BUDESONIDE 6 MG: 3 CAPSULE ORAL at 09:23

## 2020-08-04 RX ADMIN — LEVETIRACETAM 500 MG: 500 TABLET, FILM COATED ORAL at 09:25

## 2020-08-04 ASSESSMENT — PAIN SCALES - GENERAL: PAINLEVEL_OUTOF10: 0

## 2020-08-04 NOTE — DISCHARGE SUMMARY
persistent symptoms of fatigue and is having difficulty completing her daily activities. [place] Handler states having trouble getting herself out of bed and eating regularly because of her symptoms. Nothing helps her feel better and the feelings are exacerbated by any physical activity. Additionally, the patient has a bandage on her left lower leg that she says is a cut she received from hitting her leg on a piece of furniture after a fall. She reports no associated symptoms to her weakness.      Subjective (day of discharge): Note, I took over care day of planned discharge to nursing home. Patient states she is doing well, weakness is improved. She denies chest pain shortness of breath nausea vomiting diarrhea urinary symptoms fever chills. Patient is discharged in stable condition. Physical Exam:-  Vitals:   Patient Vitals for the past 24 hrs:   BP Temp Temp src Pulse Resp SpO2 Weight   08/04/20 1300 112/62 -- -- 78 16 98 % --   08/04/20 1144 (!) 145/73 98 °F (36.7 °C) Oral 81 16 94 % --   08/04/20 0900 130/62 97.8 °F (36.6 °C) Oral 80 17 99 % --   08/04/20 0324 138/67 97.9 °F (36.6 °C) Oral 64 16 99 % 94 lb 14.4 oz (43 kg)   08/03/20 2321 125/70 -- -- 63 -- 99 % --   08/03/20 2030 (!) 142/76 97.7 °F (36.5 °C) Oral 94 16 97 % --     Weight: Weight: 94 lb 14.4 oz (43 kg)   24 hour intake/output:     Intake/Output Summary (Last 24 hours) at 8/4/2020 1932  Last data filed at 8/4/2020 0371  Gross per 24 hour   Intake 765 ml   Output --   Net 765 ml       General appearance: No apparent distress, appears stated age and cooperative. HEENT: Normal cephalic, atraumatic without obvious deformity. Pupils equal, round, and reactive to light. Extra ocular muscles intact. Conjunctivae/corneas clear. Neck: Supple, with full range of motion. No jugular venous distention. Trachea midline. Respiratory:  Normal respiratory effort. Clear to auscultation, bilaterally without Rales/Wheezes/Rhonchi.   Cardiovascular: Regular rate and rhythm with normal S1/S2 without murmurs, rubs or gallops. Abdomen: Soft, non-tender, non-distended with normal bowel sounds. Musculoskeletal:  No clubbing, cyanosis or edema bilaterally. Skin: Skin color, texture, turgor normal.  No rashes or lesions. Neurologic:  Neurovascularly intact without any focal sensory/motor deficits.  Cranial nerves: II-XII intact, grossly non-focal.  Psychiatric: Alert and oriented, thought content appropriate, normal insight  Capillary Refill: Brisk,< 3 seconds   Peripheral Pulses: +2 palpable, equal bilaterally     Labs :  Recent Results (from the past 72 hour(s))   Basic Metabolic Panel    Collection Time: 08/03/20  3:51 AM   Result Value Ref Range    Sodium 140 135 - 145 meq/L    Potassium 3.2 (L) 3.5 - 5.2 meq/L    Chloride 107 98 - 111 meq/L    CO2 19 (L) 23 - 33 meq/L    Glucose 106 70 - 108 mg/dL    BUN 38 (H) 7 - 22 mg/dL    CREATININE 1.3 (H) 0.4 - 1.2 mg/dL    Calcium 8.3 (L) 8.5 - 10.5 mg/dL   CBC    Collection Time: 08/03/20  3:51 AM   Result Value Ref Range    WBC 12.0 (H) 4.8 - 10.8 thou/mm3    RBC 3.23 (L) 4.20 - 5.40 mill/mm3    Hemoglobin 9.7 (L) 12.0 - 16.0 gm/dl    Hematocrit 30.2 (L) 37.0 - 47.0 %    MCV 93.5 81.0 - 99.0 fL    MCH 30.0 26.0 - 33.0 pg    MCHC 32.1 (L) 32.2 - 35.5 gm/dl    RDW-CV 13.7 11.5 - 14.5 %    RDW-SD 46.9 (H) 35.0 - 45.0 fL    Platelets 603 600 - 011 thou/mm3    MPV 9.6 9.4 - 12.4 fL   Anion Gap    Collection Time: 08/03/20  3:51 AM   Result Value Ref Range    Anion Gap 14.0 8.0 - 16.0 meq/L   Glomerular Filtration Rate, Estimated    Collection Time: 08/03/20  3:51 AM   Result Value Ref Range    Est, Glom Filt Rate 39 (A) ml/min/1.73m2   Culture, Blood 1    Collection Time: 08/03/20  8:33 AM    Specimen: Blood   Result Value Ref Range    Blood Culture, Routine No growth-preliminary     Culture, Blood 2    Collection Time: 08/03/20  8:50 PM    Specimen: Blood   Result Value Ref Range    Blood Culture, Routine No growth-preliminary Basic Metabolic Panel    Collection Time: 08/04/20  4:05 AM   Result Value Ref Range    Sodium 138 135 - 145 meq/L    Potassium 3.9 3.5 - 5.2 meq/L    Chloride 107 98 - 111 meq/L    CO2 18 (L) 23 - 33 meq/L    Glucose 98 70 - 108 mg/dL    BUN 33 (H) 7 - 22 mg/dL    CREATININE 1.2 0.4 - 1.2 mg/dL    Calcium 8.5 8.5 - 10.5 mg/dL   CBC    Collection Time: 08/04/20  4:05 AM   Result Value Ref Range    WBC 11.1 (H) 4.8 - 10.8 thou/mm3    RBC 3.17 (L) 4.20 - 5.40 mill/mm3    Hemoglobin 9.8 (L) 12.0 - 16.0 gm/dl    Hematocrit 29.7 (L) 37.0 - 47.0 %    MCV 93.7 81.0 - 99.0 fL    MCH 30.9 26.0 - 33.0 pg    MCHC 33.0 32.2 - 35.5 gm/dl    RDW-CV 14.0 11.5 - 14.5 %    RDW-SD 48.6 (H) 35.0 - 45.0 fL    Platelets 812 573 - 181 thou/mm3    MPV 9.2 (L) 9.4 - 12.4 fL   Anion Gap    Collection Time: 08/04/20  4:05 AM   Result Value Ref Range    Anion Gap 13.0 8.0 - 16.0 meq/L   Glomerular Filtration Rate, Estimated    Collection Time: 08/04/20  4:05 AM   Result Value Ref Range    Est, Glom Filt Rate 42 (A) ml/min/1.73m2   COVID-19    Collection Time: 08/04/20  9:30 AM   Result Value Ref Range    SARS-CoV-2, NAAT NOT DETECTED NOT DETECT        Microbiology:    Blood culture #1:   Lab Results   Component Value Date    BC No growth-preliminary  08/03/2020     Blood culture #2:No results found for: BLOODCULT2  Organism:  No results found for: LABGRAM  MRSA culture only:No results found for: [facility]  Urine culture: No results found for: LABURIN  Lab Results   Component Value Date    ORG Escherichia coli 08/01/2020      Respiratory culture: No results found for: CULTRESP  Aerobic and Anaerobic :  No results found for: LABAERO  No results found for: LABANAE    Urinalysis:     Lab Results   Component Value Date    NITRU NEGATIVE 08/01/2020    WBCUA > 100 08/01/2020    BACTERIA MANY 08/01/2020    RBCUA 0-2 08/01/2020    BLOODU SMALL 08/01/2020    GLUCOSEU NEGATIVE 08/01/2020       Radiology:  Xr Chest Portable    Result Date: 8/1/2020  PROCEDURE: XR CHEST PORTABLE CLINICAL INFORMATION: fatigue. COMPARISON: December 18, 2016 TECHNIQUE: AP upright view of the chest. FINDINGS: The heart and mediastinum remain within normal limits. There is redemonstration of mildly coarse markings. Current exam shows slight increased infrahilar atelectasis versus minimal filtrate changes. There is no vascular congestion effusion or pneumothorax. The skeleton is negative for acute appearing pathology     1. Mild infrahilar atelectasis or minimal bronchitis changes. **This report has been created using voice recognition software. It may contain minor errors which are inherent in voice recognition technology. ** Final report electronically signed by Dr. Germán Johnson on 8/1/2020 5:24 AM       Consults:   IP CONSULT TO SOCIAL WORK    Discharge Medications:      Medication List      START taking these medications    ciprofloxacin 500 MG tablet  Commonly known as:  CIPRO  Take 1 tablet by mouth 2 times daily for 11 days        CHANGE how you take these medications    furosemide 20 MG tablet  Commonly known as:  LASIX  Take 1 tablet by mouth daily as needed (leg swelling, shortness of breath, increase in daily weight > 3lbs.)  What changed:    · when to take this  · reasons to take this     pantoprazole 40 MG tablet  Commonly known as:  PROTONIX  Take 1 tablet by mouth 2 times daily  What changed:  when to take this        CONTINUE taking these medications    amLODIPine 2.5 MG tablet  Commonly known as:  NORVASC     atorvastatin 40 MG tablet  Commonly known as:  LIPITOR     budesonide 3 MG extended release capsule  Commonly known as:  ENTOCORT EC     cyanocobalamin 1000 MCG/ML injection     dicyclomine 10 MG capsule  Commonly known as:  BENTYL     diphenoxylate-atropine 2.5-0.025 MG per tablet  Commonly known as:  LOMOTIL     Klor-Con 10 10 MEQ extended release tablet  Generic drug:  potassium chloride     levETIRAcetam 500 MG tablet  Commonly known as: KEPPRA  Take 1 tablet by mouth 2 times daily     loperamide 2 MG capsule  Commonly known as:  IMODIUM     Magic Mouthwash  Commonly known as:  Miracle Mouthwash     magnesium oxide 400 (240 Mg) MG tablet  Commonly known as:  MAG-OX  Take 1 tablet by mouth daily     meclizine 25 MG tablet  Commonly known as:  ANTIVERT     melatonin 3 MG Tabs tablet     METAMUCIL FIBER PO     metoprolol tartrate 25 MG tablet  Commonly known as:  LOPRESSOR     mirtazapine 15 MG tablet  Commonly known as:  REMERON     MULTI VITAMIN PO     ondansetron 8 MG tablet  Commonly known as:  ZOFRAN     QUEtiapine 25 MG tablet  Commonly known as:  SEROQUEL     triamcinolone 0.025 % ointment  Commonly known as:  KENALOG     vitamin D3 10 MCG (400 UNIT) Tabs tablet  Commonly known as:  CHOLECALCIFEROL     Zenpep 14526-318212 units Cpep  Generic drug:  Pancrelipase (Lip-Prot-Amyl)        STOP taking these medications    famotidine 40 MG tablet  Commonly known as:  PEPCID           Where to Get Your Medications      Information about where to get these medications is not yet available    Ask your nurse or doctor about these medications  · ciprofloxacin 500 MG tablet  · furosemide 20 MG tablet          Patient Instructions:    Discharge lab work: BMP, CBC  Activity: activity as tolerated and no driving for today  Diet: No diet orders on file      Follow-up visits:   Burgess Grabiel DO  [address]  [phone]      follow up after discharge from [facility] [address] [facility]. [address] [facility]  [phone]             Disposition: SNF  Condition at Discharge: Stable    Time Spent: 60 minutes    Signed: Thank you [facility] Jerry DO for the opportunity to be involved in this patient's care.     Electronically signed by Tim Jenkins PA-C on 8/4/2020 at 7:32 PM  Discharging Hospitalist

## 2020-08-04 NOTE — DISCHARGE INSTR - COC
Continuity of Care Form    Patient Name: Braulio Meyers   :  1931  MRN:  [id number]    Admit date:  2020  Discharge date:  2020    Code Status Order: Limited   Advance Directives:   [facility] Documentation     Date/Time Healthcare Directive Type of Healthcare Directive Copy in [facility] [address] Agent's Name Healthcare Agent's Phone Number    20 1008  No, patient does not have an advance directive for healthcare treatment -- -- -- -- --          Admitting Physician:  Mychal Kinney DO  PCP: Mandi Hill DO    Discharging Nurse: [facility] Unit/Room#: 4A-08/008-A  Discharging Unit Phone Number: [phone]    Emergency Contact:   Extended Emergency Contact Information  Primary Emergency Contact: Cami Cormier And Maribel Contreras  Address:     [address] Phone: [phone]  Mobile Phone: [phone]  Relation: Child    Past Surgical History:  Past Surgical History:   Procedure Laterality Date    APPENDECTOMY      CHOLECYSTECTOMY      COLONOSCOPY      EYE SURGERY      HERNIA REPAIR      HYSTERECTOMY      UPPER GASTROINTESTINAL ENDOSCOPY         Immunization History:   Immunization History   Administered Date(s) Administered    Influenza Vaccine, unspecified formulation 2016    Influenza, High Dose (Fluzone 65 yrs and older) 10/19/2017       Active Problems:  Patient Active Problem List   Diagnosis Code    Partial seizure ([facility] R56.9    ARIS (acute kidney injury) ([facility] N17.9    CKD (chronic kidney disease) stage 3, GFR 30-59 ml/min ([facility]) N18.3    Benign essential HTN I10    Chronic pancreatitis ([facility] K86.1    Lesion of right native kidney N28.9    Chronic midline low back pain without sciatica M54.5, H60.93    Metabolic acidosis Q00.8    Diarrhea R19.7    Chronic diarrhea of unknown origin K52.9    Acute renal failure superimposed on stage 3 chronic kidney disease ([facility] N17.9, N18.3    Severe protein-calorie malnutrition Drgaan Honey: less than 60% of standard weight) ([facility] E43    Fat malabsorption 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.49    Hypophosphatemia E83.39    Hypomagnesemia E83.42    Idiopathic chronic pancreatitis (HCC) K86.1    Urinary tract infection without hematuria N39.0       Isolation/Infection:   Isolation          No Isolation        Patient Infection Status     Infection Onset Added Last Indicated Last Indicated By Review Planned Expiration Resolved Resolved By    None active    Resolved    COVID-19 Rule Out 08/01/20 08/01/20 08/01/20 COVID-19 (Ordered)   08/01/20 Rule-Out Test Resulted          Nurse Assessment:  Last Vital Signs: /67   Pulse 64   Temp 97.9 °F (36.6 °C) (Oral)   Resp 16   Ht 4' 10\" (1.473 m)   Wt 94 lb 14.4 oz (43 kg)   SpO2 99%   BMI 19.83 kg/m²     Last documented pain score (0-10 scale): Pain Level: 0  Last Weight:   Wt Readings from Last 1 Encounters:   08/04/20 94 lb 14.4 oz (43 kg)     Mental Status:  oriented and alert    IV Access:  - None    Nursing Mobility/ADLs:  Walking   Assisted  Transfer  Assisted  Bathing  Assisted  Dressing  Assisted  Toileting  Assisted  Feeding  Independent  Med Admin  Dependent  Med Delivery   whole    Wound Care Documentation and Therapy:  Wound 08/01/20 Pretibial Left (Active)   Wound Traumatic 08/04/20 0447   Dressing Status Clean;Dry; Intact 08/04/20 0447   Dressing Changed Dressing reinforced 08/03/20 0300   Dressing Change Due 08/05/20 08/04/20 0447   Wound Assessment Other (Comment);Pink;Slough 08/04/20 0447   Drainage Amount Small 08/04/20 0447   Drainage Description Serosanguinous 08/04/20 0447   Odor None 08/04/20 0447   Brina-wound Assessment Pink;Clean 08/04/20 0447   Number of days: 2        Elimination:  Continence:   · Bowel:  Yes  · Bladder: Yes  Urinary Catheter: None   Colostomy/Ileostomy/Ileal Conduit: No       Date of Last BM: 08/02/2020    Intake/Output Summary (Last 24 hours) at 8/4/2020 5333  Last data diagnosis listed and that she requires [place] for greater 30 days.      Update Admission H&P: No change in H&P    PHYSICIAN SIGNATURE:  Electronically signed by Julieta Aguirre MD on 8/4/20 at 11:44 AM EDT

## 2020-08-04 NOTE — PLAN OF CARE
Problem: Falls - Risk of:  Goal: Will remain free from falls  Description: Will remain free from falls  Outcome: Ongoing  Note: Patient absent of falls this shift, fall band intact, bed alarm set, falling star magnet in place. Goal: Absence of physical injury  Description: Absence of physical injury  Outcome: Ongoing  Note: No physical injury noted this shift. Problem: Skin Integrity:  Goal: Will show no infection signs and symptoms  Description: Will show no infection signs and symptoms  Outcome: Ongoing  Note: Monitoring s/s of infection. Goal: Absence of new skin breakdown  Description: Absence of new skin breakdown  Outcome: Ongoing  Note: No new skin breakdown noted this shift. Problem: Pain:  Goal: Pain level will decrease  Description: Pain level will decrease  Outcome: Ongoing  Note: Patient voices pain 0/10. Patients pain goal is 0/10. PRN pain medications given as ordered. Patients pain goal is a 0. Non-pharmacological interventions include: rest and repositioning. Goal: Control of acute pain  Description: Control of acute pain  Outcome: Ongoing  Note: Patient voices pain 0/10. Patients pain goal is 0/10. PRN pain medications given as ordered. Patients pain goal is a 0. Non-pharmacological interventions include: rest and repositioning. Goal: Control of chronic pain  Description: Control of chronic pain  Outcome: Ongoing  Note: Patient voices pain 0/10. Patients pain goal is 0/10. PRN pain medications given as ordered. Patients pain goal is a 0. Non-pharmacological interventions include: rest and repositioning. Problem: Discharge Planning:  Goal: Discharged to appropriate level of care  Description: Discharged to appropriate level of care  Outcome: Ongoing  Note: Discharge plan is to [facility]. Her son Carla Aguilera will transport her to [facility].       Problem: Urinary Elimination:  Goal: Signs and symptoms of infection will decrease  Description: Signs and symptoms of infection will decrease  Outcome: Ongoing  Note: S/s of infection will decrease. Problem: Mobility - Impaired:  Goal: Mobility will improve  Description: Mobility will improve  Outcome: Ongoing  Note: Mobility will improve. Pt uses a cane to use the bathroom, standby assist.        Care plan reviewed with patient. Patient verbalizes understanding of the plan of care and contributes to goal setting.

## 2020-08-04 NOTE — PLAN OF CARE
pain  8/4/2020 1312 by José Miguel Landa RN  Outcome: Completed  8/4/2020 0120 by Atul Reddy RN  Outcome: Ongoing  Note: Patient voices pain 0/10. Patients pain goal is 0/10. PRN pain medications given as ordered. Patients pain goal is a 0. Non-pharmacological interventions include: rest and repositioning. Problem: Discharge Planning:  Goal: Discharged to appropriate level of care  Description: Discharged to appropriate level of care  8/4/2020 1312 by José Miguel Landa RN  Outcome: Completed  8/4/2020 0120 by Atul Reddy RN  Outcome: Ongoing  Note: Discharge plan is to [facility]. Her son Afsaneh Cook will transport her to [facility]. Problem: Urinary Elimination:  Goal: Signs and symptoms of infection will decrease  Description: Signs and symptoms of infection will decrease  8/4/2020 1312 by José Miguel Landa RN  Outcome: Completed  8/4/2020 0120 by Atul Reddy RN  Outcome: Ongoing  Note: S/s of infection will decrease. Problem: Mobility - Impaired:  Goal: Mobility will improve  Description: Mobility will improve  8/4/2020 1312 by José Miguel Landa RN  Outcome: Completed  8/4/2020 0120 by Atul Reddy RN  Outcome: Ongoing  Note: Mobility will improve. Pt uses a cane to use the bathroom.

## 2020-08-04 NOTE — PROGRESS NOTES
Report called to [facility]. Discharge teaching and instructions for diagnosis/procedure of UTI, Cipro completed with patient using teachback method. AVS reviewed. Printed prescriptions given to patient. Patient voiced understanding regarding prescriptions, follow up appointments, and care of self at home. Discharged in a wheelchair to  skilled nursing per family.

## 2020-08-04 NOTE — PROGRESS NOTES
Plan of care discussed w/ pt and son Marco Antonio Portillo. There will be no picc line since blood cultures came back and Dr will be treating w/ oral antibiotics. Recheck of K+ level will be in the morning. Pt wants next IM rocephin dose to be changed to oral or IV (she does have an iv site now). Pt still wants to go to [facility] at discharge even tho she doesn't require iv atb tx. Her son Marco Antonio Portillo will transport her there by car.

## 2020-08-06 LAB — BLOOD CULTURE, ROUTINE: NORMAL

## 2020-08-08 LAB — BLOOD CULTURE, ROUTINE: NORMAL

## 2020-08-09 LAB — BLOOD CULTURE, ROUTINE: NORMAL

## 2020-09-16 RX ORDER — LEVETIRACETAM 500 MG/1
TABLET ORAL
Qty: 135 TABLET | Refills: 3 | Status: SHIPPED | OUTPATIENT
Start: 2020-09-16

## 2021-02-03 ENCOUNTER — APPOINTMENT (OUTPATIENT)
Dept: CT IMAGING | Age: 86
DRG: 481 | End: 2021-02-03
Payer: MEDICARE

## 2021-02-03 ENCOUNTER — APPOINTMENT (OUTPATIENT)
Dept: GENERAL RADIOLOGY | Age: 86
DRG: 481 | End: 2021-02-03
Payer: MEDICARE

## 2021-02-03 ENCOUNTER — HOSPITAL ENCOUNTER (INPATIENT)
Age: 86
LOS: 4 days | Discharge: NURSING, REHAB OR LONG-TERM CARE | DRG: 481 | End: 2021-02-08
Attending: EMERGENCY MEDICINE | Admitting: ORTHOPAEDIC SURGERY
Payer: MEDICARE
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
## 2021-02-03 DIAGNOSIS — S72.001A CLOSED RIGHT HIP FRACTURE, INITIAL ENCOUNTER (HCC): Primary | ICD-10-CM

## 2021-02-03 DIAGNOSIS — S72.141A CLOSED COMMINUTED INTERTROCHANTERIC FRACTURE OF PROXIMAL END OF RIGHT FEMUR, INITIAL ENCOUNTER (HCC): ICD-10-CM

## 2021-02-03 LAB
EKG ATRIAL RATE: 74 BPM
EKG P AXIS: 42 DEGREES
EKG P-R INTERVAL: 142 MS
EKG Q-T INTERVAL: 426 MS
EKG QRS DURATION: 114 MS
EKG QTC CALCULATION (BAZETT): 472 MS
EKG R AXIS: 29 DEGREES
EKG T AXIS: -16 DEGREES
EKG VENTRICULAR RATE: 74 BPM

## 2021-02-03 PROCEDURE — 99285 EMERGENCY DEPT VISIT HI MDM: CPT

## 2021-02-03 PROCEDURE — 85025 COMPLETE CBC W/AUTO DIFF WBC: CPT

## 2021-02-03 PROCEDURE — 80053 COMPREHEN METABOLIC PANEL: CPT

## 2021-02-03 PROCEDURE — 36415 COLL VENOUS BLD VENIPUNCTURE: CPT

## 2021-02-03 PROCEDURE — 83690 ASSAY OF LIPASE: CPT

## 2021-02-03 PROCEDURE — 73552 X-RAY EXAM OF FEMUR 2/>: CPT

## 2021-02-03 PROCEDURE — 93005 ELECTROCARDIOGRAM TRACING: CPT | Performed by: EMERGENCY MEDICINE

## 2021-02-03 PROCEDURE — 70450 CT HEAD/BRAIN W/O DYE: CPT

## 2021-02-03 PROCEDURE — 96374 THER/PROPH/DIAG INJ IV PUSH: CPT

## 2021-02-03 PROCEDURE — 72170 X-RAY EXAM OF PELVIS: CPT

## 2021-02-03 PROCEDURE — 72125 CT NECK SPINE W/O DYE: CPT

## 2021-02-03 PROCEDURE — 82248 BILIRUBIN DIRECT: CPT

## 2021-02-03 PROCEDURE — 84484 ASSAY OF TROPONIN QUANT: CPT

## 2021-02-03 PROCEDURE — 2580000003 HC RX 258: Performed by: EMERGENCY MEDICINE

## 2021-02-03 PROCEDURE — 6820000001 HC L2 TRAUMA SURGERY EVALUATION: Performed by: ORTHOPAEDIC SURGERY

## 2021-02-03 PROCEDURE — 6360000002 HC RX W HCPCS: Performed by: EMERGENCY MEDICINE

## 2021-02-03 PROCEDURE — 83735 ASSAY OF MAGNESIUM: CPT

## 2021-02-03 PROCEDURE — 71045 X-RAY EXAM CHEST 1 VIEW: CPT

## 2021-02-03 RX ORDER — FENTANYL CITRATE 50 UG/ML
50 INJECTION, SOLUTION INTRAMUSCULAR; INTRAVENOUS ONCE
Status: COMPLETED | OUTPATIENT
Start: 2021-02-03 | End: 2021-02-03

## 2021-02-03 RX ORDER — 0.9 % SODIUM CHLORIDE 0.9 %
1000 INTRAVENOUS SOLUTION INTRAVENOUS ONCE
Status: COMPLETED | OUTPATIENT
Start: 2021-02-03 | End: 2021-02-04

## 2021-02-03 RX ADMIN — FENTANYL CITRATE 50 MCG: 50 INJECTION, SOLUTION INTRAMUSCULAR; INTRAVENOUS at 22:28

## 2021-02-03 RX ADMIN — SODIUM CHLORIDE 1000 ML: 9 INJECTION, SOLUTION INTRAVENOUS at 22:34

## 2021-02-03 ASSESSMENT — ENCOUNTER SYMPTOMS
EYE DISCHARGE: 0
VOMITING: 0
NAUSEA: 0
VOICE CHANGE: 0
EYE REDNESS: 0
BLOOD IN STOOL: 0
WHEEZING: 0
CHEST TIGHTNESS: 0
CHOKING: 0
RHINORRHEA: 0
CONSTIPATION: 0
SORE THROAT: 0
PHOTOPHOBIA: 0
SHORTNESS OF BREATH: 0
TROUBLE SWALLOWING: 0
EYE PAIN: 0
EYE ITCHING: 0
DIARRHEA: 0
ABDOMINAL PAIN: 0
ABDOMINAL DISTENTION: 0
SINUS PRESSURE: 0
COUGH: 0

## 2021-02-03 ASSESSMENT — PAIN SCALES - GENERAL: PAINLEVEL_OUTOF10: 8

## 2021-02-03 ASSESSMENT — PAIN DESCRIPTION - PAIN TYPE: TYPE: ACUTE PAIN

## 2021-02-03 ASSESSMENT — PAIN DESCRIPTION - DESCRIPTORS: DESCRIPTORS: ACHING

## 2021-02-03 ASSESSMENT — PAIN DESCRIPTION - LOCATION: LOCATION: HIP

## 2021-02-03 NOTE — LETTER
Beneficiary Notification Letter  BPCI Advanced     Your Doctor or [address],    We wanted to let you know that your health care provider, Faina, has volunteered to take part in our Centers for Medicare & Medicaid Services (CMS) Bundled Payments for [facility] (BPCI Advanced). This doesnt change your Medicare rights or benefits and you dont need to do anything. What are bundled payments? A bundled payment combines, or bundles together, payments that Medicare makes to your health care providers for the many different kinds of medical services you might get in a specific time period. In BPCI Advanced, this time period could include a hospital inpatient stay or outpatient procedure, plus 90 days. Why would Medicare bundle payments? Bundled payments are thought of as a value-based way to pay because health care providers are responsible for both the quality and cost of medical care they give. This is a relatively new way of paying health care providers compared to thefee-for-service way Medicare has traditionally paid, where providers are paid separately for each service they provide. Bundled payments encourage these providers to work together to provide better, more coordinated care during your hospital stay, or outpatient procedure, and through your recovery. What does BPCI Advance mean for me? Youre more likely to get even better care when hospitals, doctors, and other health care providers work together. In BPCI Advanced, hospitals, doctors, and other health care providers may be rewarded for providing better, more coordinated health care. Medicare will watch BPCI Advanced participants closely to make sure that you and other patients keep getting efficient, high quality care. What do I need to know about BPCI Advanced? Whats most important for you to know is that your Medicare rights and benefits wont change because your health care provider is participating in [address]. Medicare will keep covering all of your medically necessary services. Even though Medicare will pay your doctor in a different way under BPCI Advanced, how much you have to pay wont change. Health care providers and suppliers who are enrolled in Medicare will submit their Medicare claims like they always have. Youll have all the same Medicare rights and protections, including the right to choose which hospital, doctor, or other health care provider you see. If you dont want to get care from a health care provider whos participating in [address], then youll have to choose a different health care provider whos not participating in the Model. How can I give feedback about my health care? Medicare might ask you to take a voluntary survey about the services and care you received from [facility] during your hospital stay or outpatient procedure and for a specific period of time afterwards. You can decide whether you want to take the voluntary survey, but if you do, itll help Medicare make BPCI Advanced and the care of other Medicare patients better. If you have concerns or complaints about your care, you can:   · Talk to your doctor or health care provider. · Contact your Beneficiary and Family Centered Care Quality Improvement   Organization [facility]). You can get your [employer]QIOs phone number  at  Medicare.gov/contacts or by calling 1-800-MEDICARE. TTY users can call  3-275.983.8727. Where can I learn more about BPCI Advanced? Learn more about BPCI Advanced at https://innovation.cms.gov/initiatives/bpci-advanced/:  · A list of all the hospitals and physician group practices in the country participating in [address]. · All of the inpatient and outpatient Clinical Episodes that are currently included under BPCI Advanced. A Clinical Episode is a grouping of medical conditions or diagnoses that are included in the [facility].

## 2021-02-04 ENCOUNTER — APPOINTMENT (OUTPATIENT)
Dept: GENERAL RADIOLOGY | Age: 86
DRG: 481 | End: 2021-02-04
Payer: MEDICARE

## 2021-02-04 ENCOUNTER — ANESTHESIA EVENT (OUTPATIENT)
Dept: OPERATING ROOM | Age: 86
DRG: 481 | End: 2021-02-04
Payer: MEDICARE

## 2021-02-04 ENCOUNTER — ANESTHESIA (OUTPATIENT)
Dept: OPERATING ROOM | Age: 86
DRG: 481 | End: 2021-02-04
Payer: MEDICARE

## 2021-02-04 VITALS
TEMPERATURE: 97.9 F | OXYGEN SATURATION: 100 % | SYSTOLIC BLOOD PRESSURE: 220 MMHG | DIASTOLIC BLOOD PRESSURE: 92 MMHG | RESPIRATION RATE: 3 BRPM

## 2021-02-04 PROBLEM — S72.141A CLOSED COMMINUTED INTERTROCHANTERIC FRACTURE OF PROXIMAL END OF RIGHT FEMUR, INITIAL ENCOUNTER (HCC): Status: ACTIVE | Noted: 2021-02-04

## 2021-02-04 LAB
ABO: NORMAL
ALBUMIN SERPL-MCNC: 3.7 G/DL (ref 3.5–5.1)
ALP BLD-CCNC: 139 U/L (ref 38–126)
ALT SERPL-CCNC: 23 U/L (ref 11–66)
ANION GAP SERPL CALCULATED.3IONS-SCNC: 10 MEQ/L (ref 8–16)
ANION GAP SERPL CALCULATED.3IONS-SCNC: 9 MEQ/L (ref 8–16)
ANTIBODY SCREEN: NORMAL
AST SERPL-CCNC: 19 U/L (ref 5–40)
BACTERIA: ABNORMAL /HPF
BASOPHILS # BLD: 0.2 %
BASOPHILS ABSOLUTE: 0 THOU/MM3 (ref 0–0.1)
BILIRUB SERPL-MCNC: 0.4 MG/DL (ref 0.3–1.2)
BILIRUBIN DIRECT: < 0.2 MG/DL (ref 0–0.3)
BILIRUBIN URINE: NEGATIVE
BLOOD, URINE: NEGATIVE
BUN BLDV-MCNC: 26 MG/DL (ref 7–22)
BUN BLDV-MCNC: 26 MG/DL (ref 7–22)
CALCIUM SERPL-MCNC: 8.8 MG/DL (ref 8.5–10.5)
CALCIUM SERPL-MCNC: 8.9 MG/DL (ref 8.5–10.5)
CASTS 2: ABNORMAL /LPF
CASTS UA: ABNORMAL /LPF
CHARACTER, URINE: CLEAR
CHLORIDE BLD-SCNC: 102 MEQ/L (ref 98–111)
CHLORIDE BLD-SCNC: 104 MEQ/L (ref 98–111)
CO2: 22 MEQ/L (ref 23–33)
CO2: 22 MEQ/L (ref 23–33)
COLOR: YELLOW
CREAT SERPL-MCNC: 1 MG/DL (ref 0.4–1.2)
CREAT SERPL-MCNC: 1.1 MG/DL (ref 0.4–1.2)
CRYSTALS, UA: ABNORMAL
EOSINOPHIL # BLD: 0.1 %
EOSINOPHILS ABSOLUTE: 0 THOU/MM3 (ref 0–0.4)
EPITHELIAL CELLS, UA: ABNORMAL /HPF
ERYTHROCYTE [DISTWIDTH] IN BLOOD BY AUTOMATED COUNT: 16 % (ref 11.5–14.5)
ERYTHROCYTE [DISTWIDTH] IN BLOOD BY AUTOMATED COUNT: 16.1 % (ref 11.5–14.5)
ERYTHROCYTE [DISTWIDTH] IN BLOOD BY AUTOMATED COUNT: 53.5 FL (ref 35–45)
ERYTHROCYTE [DISTWIDTH] IN BLOOD BY AUTOMATED COUNT: 53.9 FL (ref 35–45)
GFR SERPL CREATININE-BSD FRML MDRD: 47 ML/MIN/1.73M2
GFR SERPL CREATININE-BSD FRML MDRD: 52 ML/MIN/1.73M2
GLUCOSE BLD-MCNC: 156 MG/DL (ref 70–108)
GLUCOSE BLD-MCNC: 162 MG/DL (ref 70–108)
GLUCOSE URINE: NEGATIVE MG/DL
HCT VFR BLD CALC: 30.1 % (ref 37–47)
HCT VFR BLD CALC: 31.5 % (ref 37–47)
HEMOGLOBIN: 10.1 GM/DL (ref 12–16)
HEMOGLOBIN: 9.5 GM/DL (ref 12–16)
IMMATURE GRANS (ABS): 0.13 THOU/MM3 (ref 0–0.07)
IMMATURE GRANULOCYTES: 1 %
INR BLD: 0.96 (ref 0.85–1.13)
KETONES, URINE: NEGATIVE
LEUKOCYTE ESTERASE, URINE: ABNORMAL
LIPASE: 26 U/L (ref 5.6–51.3)
LYMPHOCYTES # BLD: 9.4 %
LYMPHOCYTES ABSOLUTE: 1.3 THOU/MM3 (ref 1–4.8)
MAGNESIUM: 2.1 MG/DL (ref 1.6–2.4)
MCH RBC QN AUTO: 29.1 PG (ref 26–33)
MCH RBC QN AUTO: 29.4 PG (ref 26–33)
MCHC RBC AUTO-ENTMCNC: 31.6 GM/DL (ref 32.2–35.5)
MCHC RBC AUTO-ENTMCNC: 32.1 GM/DL (ref 32.2–35.5)
MCV RBC AUTO: 91.6 FL (ref 81–99)
MCV RBC AUTO: 92.3 FL (ref 81–99)
MISCELLANEOUS 2: ABNORMAL
MONOCYTES # BLD: 6.4 %
MONOCYTES ABSOLUTE: 0.9 THOU/MM3 (ref 0.4–1.3)
MRSA SCREEN RT-PCR: NEGATIVE
NITRITE, URINE: NEGATIVE
NUCLEATED RED BLOOD CELLS: 0 /100 WBC
OSMOLALITY CALCULATION: 276.2 MOSMOL/KG (ref 275–300)
OSMOLALITY CALCULATION: 278.4 MOSMOL/KG (ref 275–300)
PH UA: 6.5 (ref 5–9)
PLATELET # BLD: 218 THOU/MM3 (ref 130–400)
PLATELET # BLD: 239 THOU/MM3 (ref 130–400)
PMV BLD AUTO: 8.4 FL (ref 9.4–12.4)
PMV BLD AUTO: 8.6 FL (ref 9.4–12.4)
POTASSIUM REFLEX MAGNESIUM: 4.3 MEQ/L (ref 3.5–5.2)
POTASSIUM SERPL-SCNC: 4.1 MEQ/L (ref 3.5–5.2)
PROTEIN UA: ABNORMAL
RBC # BLD: 3.26 MILL/MM3 (ref 4.2–5.4)
RBC # BLD: 3.44 MILL/MM3 (ref 4.2–5.4)
RBC URINE: ABNORMAL /HPF
RENAL EPITHELIAL, UA: ABNORMAL
RH FACTOR: NORMAL
SARS-COV-2, NAAT: NOT DETECTED
SEG NEUTROPHILS: 82.9 %
SEGMENTED NEUTROPHILS ABSOLUTE COUNT: 11.2 THOU/MM3 (ref 1.8–7.7)
SODIUM BLD-SCNC: 134 MEQ/L (ref 135–145)
SODIUM BLD-SCNC: 135 MEQ/L (ref 135–145)
SPECIFIC GRAVITY, URINE: 1.01 (ref 1–1.03)
TOTAL PROTEIN: 6.6 G/DL (ref 6.1–8)
TROPONIN T: < 0.01 NG/ML
UROBILINOGEN, URINE: 0.2 EU/DL (ref 0–1)
WBC # BLD: 13.5 THOU/MM3 (ref 4.8–10.8)
WBC # BLD: 9.6 THOU/MM3 (ref 4.8–10.8)
WBC UA: ABNORMAL /HPF
YEAST: ABNORMAL

## 2021-02-04 PROCEDURE — 86923 COMPATIBILITY TEST ELECTRIC: CPT

## 2021-02-04 PROCEDURE — 2500000003 HC RX 250 WO HCPCS: Performed by: NURSE ANESTHETIST, CERTIFIED REGISTERED

## 2021-02-04 PROCEDURE — 86900 BLOOD TYPING SEROLOGIC ABO: CPT

## 2021-02-04 PROCEDURE — 2709999900 HC NON-CHARGEABLE SUPPLY: Performed by: ORTHOPAEDIC SURGERY

## 2021-02-04 PROCEDURE — 2500000003 HC RX 250 WO HCPCS: Performed by: ORTHOPAEDIC SURGERY

## 2021-02-04 PROCEDURE — P9016 RBC LEUKOCYTES REDUCED: HCPCS

## 2021-02-04 PROCEDURE — 3700000001 HC ADD 15 MINUTES (ANESTHESIA): Performed by: ORTHOPAEDIC SURGERY

## 2021-02-04 PROCEDURE — 2580000003 HC RX 258: Performed by: ORTHOPAEDIC SURGERY

## 2021-02-04 PROCEDURE — 0QS606Z REPOSITION RIGHT UPPER FEMUR WITH INTRAMEDULLARY INTERNAL FIXATION DEVICE, OPEN APPROACH: ICD-10-PCS | Performed by: ORTHOPAEDIC SURGERY

## 2021-02-04 PROCEDURE — 87641 MR-STAPH DNA AMP PROBE: CPT

## 2021-02-04 PROCEDURE — C1713 ANCHOR/SCREW BN/BN,TIS/BN: HCPCS | Performed by: ORTHOPAEDIC SURGERY

## 2021-02-04 PROCEDURE — 6360000002 HC RX W HCPCS: Performed by: PHYSICIAN ASSISTANT

## 2021-02-04 PROCEDURE — 2580000003 HC RX 258: Performed by: NURSE PRACTITIONER

## 2021-02-04 PROCEDURE — 7100000000 HC PACU RECOVERY - FIRST 15 MIN: Performed by: ORTHOPAEDIC SURGERY

## 2021-02-04 PROCEDURE — 6360000002 HC RX W HCPCS: Performed by: NURSE PRACTITIONER

## 2021-02-04 PROCEDURE — 86901 BLOOD TYPING SEROLOGIC RH(D): CPT

## 2021-02-04 PROCEDURE — 6360000002 HC RX W HCPCS: Performed by: ORTHOPAEDIC SURGERY

## 2021-02-04 PROCEDURE — 3209999900 FLUORO FOR SURGICAL PROCEDURES

## 2021-02-04 PROCEDURE — 36415 COLL VENOUS BLD VENIPUNCTURE: CPT

## 2021-02-04 PROCEDURE — 6360000002 HC RX W HCPCS: Performed by: NURSE ANESTHETIST, CERTIFIED REGISTERED

## 2021-02-04 PROCEDURE — 81001 URINALYSIS AUTO W/SCOPE: CPT

## 2021-02-04 PROCEDURE — 99222 1ST HOSP IP/OBS MODERATE 55: CPT | Performed by: PHYSICIAN ASSISTANT

## 2021-02-04 PROCEDURE — 85610 PROTHROMBIN TIME: CPT

## 2021-02-04 PROCEDURE — 6360000002 HC RX W HCPCS

## 2021-02-04 PROCEDURE — 6370000000 HC RX 637 (ALT 250 FOR IP): Performed by: NURSE PRACTITIONER

## 2021-02-04 PROCEDURE — U0002 COVID-19 LAB TEST NON-CDC: HCPCS

## 2021-02-04 PROCEDURE — 6360000002 HC RX W HCPCS: Performed by: ANESTHESIOLOGY

## 2021-02-04 PROCEDURE — 6370000000 HC RX 637 (ALT 250 FOR IP): Performed by: PHYSICIAN ASSISTANT

## 2021-02-04 PROCEDURE — 3700000000 HC ANESTHESIA ATTENDED CARE: Performed by: ORTHOPAEDIC SURGERY

## 2021-02-04 PROCEDURE — 85027 COMPLETE CBC AUTOMATED: CPT

## 2021-02-04 PROCEDURE — 80048 BASIC METABOLIC PNL TOTAL CA: CPT

## 2021-02-04 PROCEDURE — 2720000010 HC SURG SUPPLY STERILE: Performed by: ORTHOPAEDIC SURGERY

## 2021-02-04 PROCEDURE — 2580000003 HC RX 258: Performed by: PHYSICIAN ASSISTANT

## 2021-02-04 PROCEDURE — 3600000014 HC SURGERY LEVEL 4 ADDTL 15MIN: Performed by: ORTHOPAEDIC SURGERY

## 2021-02-04 PROCEDURE — 7100000001 HC PACU RECOVERY - ADDTL 15 MIN: Performed by: ORTHOPAEDIC SURGERY

## 2021-02-04 PROCEDURE — 86850 RBC ANTIBODY SCREEN: CPT

## 2021-02-04 PROCEDURE — 93010 ELECTROCARDIOGRAM REPORT: CPT | Performed by: NUCLEAR MEDICINE

## 2021-02-04 PROCEDURE — 73502 X-RAY EXAM HIP UNI 2-3 VIEWS: CPT

## 2021-02-04 PROCEDURE — 6370000000 HC RX 637 (ALT 250 FOR IP): Performed by: PHARMACIST

## 2021-02-04 PROCEDURE — 3600000004 HC SURGERY LEVEL 4 BASE: Performed by: ORTHOPAEDIC SURGERY

## 2021-02-04 PROCEDURE — 1200000000 HC SEMI PRIVATE

## 2021-02-04 DEVICE — TRIGEN LOW PROFILE SCREW 5.0MM X 32.5MM
Type: IMPLANTABLE DEVICE | Site: FEMUR | Status: FUNCTIONAL
Brand: TRIGEN

## 2021-02-04 DEVICE — TRIGEN INTERTAN 10S 10MM X 34CM 125DEGREE RIGHT
Type: IMPLANTABLE DEVICE | Site: FEMUR | Status: FUNCTIONAL
Brand: TRIGEN

## 2021-02-04 DEVICE — INTERTAN LAG/COMPRESSION SCREW KIT                                    80MM / 75MM
Type: IMPLANTABLE DEVICE | Site: FEMUR | Status: FUNCTIONAL
Brand: TRIGEN

## 2021-02-04 RX ORDER — CYCLOBENZAPRINE HCL 10 MG
5 TABLET ORAL 3 TIMES DAILY PRN
Status: DISCONTINUED | OUTPATIENT
Start: 2021-02-04 | End: 2021-02-09 | Stop reason: HOSPADM

## 2021-02-04 RX ORDER — FAMOTIDINE 40 MG/1
40 TABLET, FILM COATED ORAL NIGHTLY
COMMUNITY

## 2021-02-04 RX ORDER — TRAMADOL HYDROCHLORIDE 50 MG/1
50 TABLET ORAL EVERY 6 HOURS PRN
Status: DISCONTINUED | OUTPATIENT
Start: 2021-02-04 | End: 2021-02-09 | Stop reason: HOSPADM

## 2021-02-04 RX ORDER — HYDROCODONE BITARTRATE AND ACETAMINOPHEN 5; 325 MG/1; MG/1
1 TABLET ORAL EVERY 6 HOURS PRN
Status: DISCONTINUED | OUTPATIENT
Start: 2021-02-04 | End: 2021-02-09 | Stop reason: HOSPADM

## 2021-02-04 RX ORDER — VITAMIN B COMPLEX
1000 TABLET ORAL DAILY
Status: DISCONTINUED | OUTPATIENT
Start: 2021-02-05 | End: 2021-02-09 | Stop reason: HOSPADM

## 2021-02-04 RX ORDER — LANOLIN ALCOHOL/MO/W.PET/CERES
4.5 CREAM (GRAM) TOPICAL NIGHTLY
Status: DISCONTINUED | OUTPATIENT
Start: 2021-02-04 | End: 2021-02-09 | Stop reason: HOSPADM

## 2021-02-04 RX ORDER — ATORVASTATIN CALCIUM 40 MG/1
40 TABLET, FILM COATED ORAL DAILY
Status: DISCONTINUED | OUTPATIENT
Start: 2021-02-04 | End: 2021-02-09 | Stop reason: HOSPADM

## 2021-02-04 RX ORDER — FAMOTIDINE 20 MG/1
20 TABLET, FILM COATED ORAL NIGHTLY
Status: DISCONTINUED | OUTPATIENT
Start: 2021-02-04 | End: 2021-02-09 | Stop reason: HOSPADM

## 2021-02-04 RX ORDER — SODIUM CHLORIDE 9 MG/ML
INJECTION, SOLUTION INTRAVENOUS CONTINUOUS
Status: DISCONTINUED | OUTPATIENT
Start: 2021-02-04 | End: 2021-02-09 | Stop reason: HOSPADM

## 2021-02-04 RX ORDER — POLYETHYLENE GLYCOL 3350 17 G/17G
17 POWDER, FOR SOLUTION ORAL DAILY PRN
COMMUNITY

## 2021-02-04 RX ORDER — FENTANYL CITRATE 50 UG/ML
25 INJECTION, SOLUTION INTRAMUSCULAR; INTRAVENOUS EVERY 5 MIN PRN
Status: DISCONTINUED | OUTPATIENT
Start: 2021-02-04 | End: 2021-02-04 | Stop reason: HOSPADM

## 2021-02-04 RX ORDER — CYANOCOBALAMIN 1000 UG/ML
1000 INJECTION INTRAMUSCULAR; SUBCUTANEOUS
Status: DISCONTINUED | OUTPATIENT
Start: 2021-02-04 | End: 2021-02-04

## 2021-02-04 RX ORDER — PANTOPRAZOLE SODIUM 40 MG/1
40 TABLET, DELAYED RELEASE ORAL
COMMUNITY

## 2021-02-04 RX ORDER — PHENOL 1.4 %
5 AEROSOL, SPRAY (ML) MUCOUS MEMBRANE NIGHTLY
COMMUNITY

## 2021-02-04 RX ORDER — DICYCLOMINE HYDROCHLORIDE 10 MG/1
10 CAPSULE ORAL 3 TIMES DAILY
Status: DISCONTINUED | OUTPATIENT
Start: 2021-02-04 | End: 2021-02-04

## 2021-02-04 RX ORDER — POLYETHYLENE GLYCOL 3350 17 G/17G
17 POWDER, FOR SOLUTION ORAL DAILY PRN
Status: DISCONTINUED | OUTPATIENT
Start: 2021-02-04 | End: 2021-02-09 | Stop reason: HOSPADM

## 2021-02-04 RX ORDER — DOCUSATE SODIUM 100 MG/1
100 CAPSULE, LIQUID FILLED ORAL 2 TIMES DAILY
Status: DISCONTINUED | OUTPATIENT
Start: 2021-02-04 | End: 2021-02-09 | Stop reason: HOSPADM

## 2021-02-04 RX ORDER — FENTANYL CITRATE 50 UG/ML
INJECTION, SOLUTION INTRAMUSCULAR; INTRAVENOUS
Status: COMPLETED
Start: 2021-02-04 | End: 2021-02-04

## 2021-02-04 RX ORDER — PANTOPRAZOLE SODIUM 40 MG/1
40 TABLET, DELAYED RELEASE ORAL DAILY
Status: DISCONTINUED | OUTPATIENT
Start: 2021-02-04 | End: 2021-02-09 | Stop reason: HOSPADM

## 2021-02-04 RX ORDER — LEVETIRACETAM 500 MG/1
500 TABLET ORAL DAILY
Status: DISCONTINUED | OUTPATIENT
Start: 2021-02-04 | End: 2021-02-09 | Stop reason: HOSPADM

## 2021-02-04 RX ORDER — BISACODYL 10 MG
10 SUPPOSITORY, RECTAL RECTAL DAILY PRN
Status: DISCONTINUED | OUTPATIENT
Start: 2021-02-04 | End: 2021-02-09 | Stop reason: HOSPADM

## 2021-02-04 RX ORDER — BUDESONIDE 3 MG/1
6 CAPSULE, COATED PELLETS ORAL EVERY MORNING
Status: DISCONTINUED | OUTPATIENT
Start: 2021-02-04 | End: 2021-02-09 | Stop reason: HOSPADM

## 2021-02-04 RX ORDER — MECLIZINE HCL 12.5 MG/1
12.5 TABLET ORAL 3 TIMES DAILY PRN
Status: DISCONTINUED | OUTPATIENT
Start: 2021-02-04 | End: 2021-02-09 | Stop reason: HOSPADM

## 2021-02-04 RX ORDER — SODIUM CHLORIDE 0.9 % (FLUSH) 0.9 %
10 SYRINGE (ML) INJECTION PRN
Status: DISCONTINUED | OUTPATIENT
Start: 2021-02-04 | End: 2021-02-09 | Stop reason: HOSPADM

## 2021-02-04 RX ORDER — HYDROCODONE BITARTRATE AND ACETAMINOPHEN 5; 325 MG/1; MG/1
1-2 TABLET ORAL
Qty: 42 TABLET | Refills: 0 | Status: SHIPPED | OUTPATIENT
Start: 2021-02-04 | End: 2021-02-11

## 2021-02-04 RX ORDER — LANOLIN ALCOHOL/MO/W.PET/CERES
10 CREAM (GRAM) TOPICAL DAILY
Status: DISCONTINUED | OUTPATIENT
Start: 2021-02-04 | End: 2021-02-04

## 2021-02-04 RX ORDER — QUETIAPINE FUMARATE 25 MG/1
25 TABLET, FILM COATED ORAL NIGHTLY
Status: DISCONTINUED | OUTPATIENT
Start: 2021-02-04 | End: 2021-02-09 | Stop reason: HOSPADM

## 2021-02-04 RX ORDER — ROCURONIUM BROMIDE 10 MG/ML
INJECTION, SOLUTION INTRAVENOUS PRN
Status: DISCONTINUED | OUTPATIENT
Start: 2021-02-04 | End: 2021-02-04 | Stop reason: SDUPTHER

## 2021-02-04 RX ORDER — FENTANYL CITRATE 50 UG/ML
INJECTION, SOLUTION INTRAMUSCULAR; INTRAVENOUS PRN
Status: DISCONTINUED | OUTPATIENT
Start: 2021-02-04 | End: 2021-02-04 | Stop reason: SDUPTHER

## 2021-02-04 RX ORDER — LEVETIRACETAM 250 MG/1
250 TABLET ORAL NIGHTLY
Status: DISCONTINUED | OUTPATIENT
Start: 2021-02-04 | End: 2021-02-09 | Stop reason: HOSPADM

## 2021-02-04 RX ORDER — DOXYCYCLINE HYCLATE 100 MG
100 TABLET ORAL 2 TIMES DAILY
COMMUNITY
Start: 2021-02-03 | End: 2021-02-12

## 2021-02-04 RX ORDER — LIDOCAINE HCL/PF 100 MG/5ML
SYRINGE (ML) INJECTION PRN
Status: DISCONTINUED | OUTPATIENT
Start: 2021-02-04 | End: 2021-02-04 | Stop reason: SDUPTHER

## 2021-02-04 RX ORDER — MECLIZINE HCL 12.5 MG/1
25 TABLET ORAL 3 TIMES DAILY PRN
Status: DISCONTINUED | OUTPATIENT
Start: 2021-02-04 | End: 2021-02-04

## 2021-02-04 RX ORDER — PROPOFOL 10 MG/ML
INJECTION, EMULSION INTRAVENOUS PRN
Status: DISCONTINUED | OUTPATIENT
Start: 2021-02-04 | End: 2021-02-04

## 2021-02-04 RX ORDER — ACETAMINOPHEN 325 MG/1
650 TABLET ORAL EVERY 6 HOURS
Status: DISCONTINUED | OUTPATIENT
Start: 2021-02-04 | End: 2021-02-09 | Stop reason: HOSPADM

## 2021-02-04 RX ORDER — HYDROCODONE BITARTRATE AND ACETAMINOPHEN 10; 325 MG/1; MG/1
1 TABLET ORAL EVERY 6 HOURS PRN
Status: DISCONTINUED | OUTPATIENT
Start: 2021-02-04 | End: 2021-02-09 | Stop reason: HOSPADM

## 2021-02-04 RX ORDER — PANCRELIPASE LIPASE, PANCRELIPASE PROTEASE, PANCRELIPASE AMYLASE 20000; 63000; 84000 [USP'U]/1; [USP'U]/1; [USP'U]/1
2 CAPSULE, DELAYED RELEASE ORAL
COMMUNITY

## 2021-02-04 RX ORDER — SODIUM CHLORIDE 0.9 % (FLUSH) 0.9 %
10 SYRINGE (ML) INJECTION EVERY 12 HOURS SCHEDULED
Status: DISCONTINUED | OUTPATIENT
Start: 2021-02-04 | End: 2021-02-09 | Stop reason: HOSPADM

## 2021-02-04 RX ORDER — ONDANSETRON 2 MG/ML
4 INJECTION INTRAMUSCULAR; INTRAVENOUS EVERY 6 HOURS PRN
Status: DISCONTINUED | OUTPATIENT
Start: 2021-02-04 | End: 2021-02-09 | Stop reason: HOSPADM

## 2021-02-04 RX ORDER — FAMOTIDINE 20 MG/1
40 TABLET, FILM COATED ORAL NIGHTLY
Status: DISCONTINUED | OUTPATIENT
Start: 2021-02-04 | End: 2021-02-04

## 2021-02-04 RX ORDER — AMLODIPINE BESYLATE 2.5 MG/1
2.5 TABLET ORAL DAILY
Status: DISCONTINUED | OUTPATIENT
Start: 2021-02-04 | End: 2021-02-09 | Stop reason: HOSPADM

## 2021-02-04 RX ORDER — PROPOFOL 10 MG/ML
INJECTION, EMULSION INTRAVENOUS PRN
Status: DISCONTINUED | OUTPATIENT
Start: 2021-02-04 | End: 2021-02-04 | Stop reason: SDUPTHER

## 2021-02-04 RX ORDER — LANOLIN ALCOHOL/MO/W.PET/CERES
400 CREAM (GRAM) TOPICAL DAILY
Status: DISCONTINUED | OUTPATIENT
Start: 2021-02-04 | End: 2021-02-04

## 2021-02-04 RX ORDER — MIRTAZAPINE 15 MG/1
15 TABLET, FILM COATED ORAL NIGHTLY
Status: DISCONTINUED | OUTPATIENT
Start: 2021-02-04 | End: 2021-02-09 | Stop reason: HOSPADM

## 2021-02-04 RX ORDER — DIPHENOXYLATE HYDROCHLORIDE AND ATROPINE SULFATE 2.5; .025 MG/1; MG/1
1 TABLET ORAL 4 TIMES DAILY PRN
Status: DISCONTINUED | OUTPATIENT
Start: 2021-02-04 | End: 2021-02-04

## 2021-02-04 RX ORDER — OMEGA-3S/DHA/EPA/FISH OIL/D3 300MG-1000
400 CAPSULE ORAL DAILY
Status: DISCONTINUED | OUTPATIENT
Start: 2021-02-04 | End: 2021-02-04

## 2021-02-04 RX ORDER — POLYETHYLENE GLYCOL 3350 17 G/17G
17 POWDER, FOR SOLUTION ORAL DAILY
Status: DISCONTINUED | OUTPATIENT
Start: 2021-02-04 | End: 2021-02-09 | Stop reason: HOSPADM

## 2021-02-04 RX ADMIN — LEVETIRACETAM 250 MG: 250 TABLET, FILM COATED ORAL at 21:23

## 2021-02-04 RX ADMIN — HYDROMORPHONE HYDROCHLORIDE 0.25 MG: 1 INJECTION, SOLUTION INTRAMUSCULAR; INTRAVENOUS; SUBCUTANEOUS at 13:57

## 2021-02-04 RX ADMIN — ACETAMINOPHEN 650 MG: 325 TABLET ORAL at 04:18

## 2021-02-04 RX ADMIN — PROPOFOL 100 MG: 10 INJECTION, EMULSION INTRAVENOUS at 09:39

## 2021-02-04 RX ADMIN — CEFAZOLIN SODIUM 2 G: 10 INJECTION, POWDER, FOR SOLUTION INTRAVENOUS at 09:37

## 2021-02-04 RX ADMIN — ATORVASTATIN CALCIUM 40 MG: 40 TABLET, FILM COATED ORAL at 21:23

## 2021-02-04 RX ADMIN — SUGAMMADEX 200 MG: 100 INJECTION, SOLUTION INTRAVENOUS at 10:07

## 2021-02-04 RX ADMIN — SODIUM CHLORIDE: 9 INJECTION, SOLUTION INTRAVENOUS at 04:20

## 2021-02-04 RX ADMIN — FENTANYL CITRATE 25 MCG: 50 INJECTION, SOLUTION INTRAMUSCULAR; INTRAVENOUS at 10:40

## 2021-02-04 RX ADMIN — CEFAZOLIN 2000 MG: 10 INJECTION, POWDER, FOR SOLUTION INTRAVENOUS at 08:20

## 2021-02-04 RX ADMIN — FENTANYL CITRATE 25 MCG: 50 INJECTION, SOLUTION INTRAMUSCULAR; INTRAVENOUS at 10:12

## 2021-02-04 RX ADMIN — QUETIAPINE FUMARATE 25 MG: 25 TABLET ORAL at 21:23

## 2021-02-04 RX ADMIN — Medication 4.5 MG: at 21:23

## 2021-02-04 RX ADMIN — CYCLOBENZAPRINE 5 MG: 10 TABLET, FILM COATED ORAL at 04:18

## 2021-02-04 RX ADMIN — SODIUM CHLORIDE: 9 INJECTION, SOLUTION INTRAVENOUS at 08:20

## 2021-02-04 RX ADMIN — METOPROLOL TARTRATE 25 MG: 25 TABLET ORAL at 21:23

## 2021-02-04 RX ADMIN — ONDANSETRON 4 MG: 2 INJECTION INTRAMUSCULAR; INTRAVENOUS at 21:23

## 2021-02-04 RX ADMIN — HYDROMORPHONE HYDROCHLORIDE 0.25 MG: 1 INJECTION, SOLUTION INTRAMUSCULAR; INTRAVENOUS; SUBCUTANEOUS at 21:23

## 2021-02-04 RX ADMIN — HYDROMORPHONE HYDROCHLORIDE 0.25 MG: 1 INJECTION, SOLUTION INTRAMUSCULAR; INTRAVENOUS; SUBCUTANEOUS at 16:55

## 2021-02-04 RX ADMIN — MIRTAZAPINE 15 MG: 15 TABLET, FILM COATED ORAL at 21:23

## 2021-02-04 RX ADMIN — ACETAMINOPHEN 650 MG: 325 TABLET ORAL at 21:23

## 2021-02-04 RX ADMIN — FENTANYL CITRATE 50 MCG: 50 INJECTION, SOLUTION INTRAMUSCULAR; INTRAVENOUS at 09:40

## 2021-02-04 RX ADMIN — CEFAZOLIN 2000 MG: 10 INJECTION, POWDER, FOR SOLUTION INTRAVENOUS at 16:54

## 2021-02-04 RX ADMIN — FENTANYL CITRATE 25 MCG: 50 INJECTION, SOLUTION INTRAMUSCULAR; INTRAVENOUS at 10:18

## 2021-02-04 RX ADMIN — PANCRELIPASE LIPASE, PANCRELIPASE PROTEASE, PANCRELIPASE AMYLASE 10000 UNITS: 10000; 32000; 42000 CAPSULE, DELAYED RELEASE ORAL at 16:56

## 2021-02-04 RX ADMIN — CYCLOBENZAPRINE 5 MG: 10 TABLET, FILM COATED ORAL at 13:59

## 2021-02-04 RX ADMIN — PANTOPRAZOLE SODIUM 40 MG: 40 TABLET, DELAYED RELEASE ORAL at 14:09

## 2021-02-04 RX ADMIN — Medication 60 MG: at 09:39

## 2021-02-04 RX ADMIN — HYDROMORPHONE HYDROCHLORIDE 0.5 MG: 1 INJECTION, SOLUTION INTRAMUSCULAR; INTRAVENOUS; SUBCUTANEOUS at 01:49

## 2021-02-04 RX ADMIN — HYDROCODONE BITARTRATE AND ACETAMINOPHEN 1 TABLET: 10; 325 TABLET ORAL at 15:03

## 2021-02-04 RX ADMIN — FAMOTIDINE 20 MG: 20 TABLET, FILM COATED ORAL at 21:23

## 2021-02-04 RX ADMIN — FENTANYL CITRATE 25 MCG: 50 INJECTION, SOLUTION INTRAMUSCULAR; INTRAVENOUS at 10:45

## 2021-02-04 RX ADMIN — ROCURONIUM BROMIDE 30 MG: 10 INJECTION INTRAVENOUS at 09:39

## 2021-02-04 RX ADMIN — ONDANSETRON 4 MG: 2 INJECTION INTRAMUSCULAR; INTRAVENOUS at 11:42

## 2021-02-04 RX ADMIN — HYDROMORPHONE HYDROCHLORIDE 0.25 MG: 1 INJECTION, SOLUTION INTRAMUSCULAR; INTRAVENOUS; SUBCUTANEOUS at 10:55

## 2021-02-04 RX ADMIN — HYDROCODONE BITARTRATE AND ACETAMINOPHEN 1 TABLET: 5; 325 TABLET ORAL at 11:45

## 2021-02-04 RX ADMIN — SODIUM CHLORIDE: 9 INJECTION, SOLUTION INTRAVENOUS at 15:08

## 2021-02-04 ASSESSMENT — PAIN SCALES - GENERAL
PAINLEVEL_OUTOF10: 10
PAINLEVEL_OUTOF10: 7
PAINLEVEL_OUTOF10: 9
PAINLEVEL_OUTOF10: 8
PAINLEVEL_OUTOF10: 10
PAINLEVEL_OUTOF10: 8
PAINLEVEL_OUTOF10: 8
PAINLEVEL_OUTOF10: 6
PAINLEVEL_OUTOF10: 7
PAINLEVEL_OUTOF10: 8

## 2021-02-04 ASSESSMENT — PULMONARY FUNCTION TESTS
PIF_VALUE: 29
PIF_VALUE: 20
PIF_VALUE: 18
PIF_VALUE: 17
PIF_VALUE: 18
PIF_VALUE: 16
PIF_VALUE: 32
PIF_VALUE: 18
PIF_VALUE: 17
PIF_VALUE: 19
PIF_VALUE: 18
PIF_VALUE: 19
PIF_VALUE: 0
PIF_VALUE: 1
PIF_VALUE: 15
PIF_VALUE: 3
PIF_VALUE: 2
PIF_VALUE: 0
PIF_VALUE: 1
PIF_VALUE: 18
PIF_VALUE: 0
PIF_VALUE: 1
PIF_VALUE: 2
PIF_VALUE: 15
PIF_VALUE: 15
PIF_VALUE: 18
PIF_VALUE: 18
PIF_VALUE: 19
PIF_VALUE: 2
PIF_VALUE: 17
PIF_VALUE: 21
PIF_VALUE: 18
PIF_VALUE: 1
PIF_VALUE: 7
PIF_VALUE: 19
PIF_VALUE: 17
PIF_VALUE: 26
PIF_VALUE: 18
PIF_VALUE: 0
PIF_VALUE: 20
PIF_VALUE: 29
PIF_VALUE: 2
PIF_VALUE: 11

## 2021-02-04 ASSESSMENT — PAIN SCALES - WONG BAKER
WONGBAKER_NUMERICALRESPONSE: 6

## 2021-02-04 ASSESSMENT — PAIN DESCRIPTION - LOCATION
LOCATION: HIP
LOCATION: HIP

## 2021-02-04 ASSESSMENT — PAIN DESCRIPTION - PROGRESSION
CLINICAL_PROGRESSION: NOT CHANGED
CLINICAL_PROGRESSION: GRADUALLY IMPROVING
CLINICAL_PROGRESSION: NOT CHANGED
CLINICAL_PROGRESSION: NOT CHANGED

## 2021-02-04 ASSESSMENT — PAIN DESCRIPTION - PAIN TYPE
TYPE: ACUTE PAIN
TYPE: SURGICAL PAIN

## 2021-02-04 ASSESSMENT — PAIN DESCRIPTION - ORIENTATION
ORIENTATION: RIGHT

## 2021-02-04 ASSESSMENT — PAIN DESCRIPTION - DESCRIPTORS: DESCRIPTORS: ACHING;CONSTANT;DISCOMFORT

## 2021-02-04 ASSESSMENT — PAIN DESCRIPTION - FREQUENCY: FREQUENCY: CONTINUOUS

## 2021-02-04 ASSESSMENT — PAIN DESCRIPTION - ONSET: ONSET: ON-GOING

## 2021-02-04 ASSESSMENT — PAIN - FUNCTIONAL ASSESSMENT: PAIN_FUNCTIONAL_ASSESSMENT: PREVENTS OR INTERFERES SOME ACTIVE ACTIVITIES AND ADLS

## 2021-02-04 NOTE — ED TRIAGE NOTES
Pt comes to the ED via EMS for fall. Pt was at home when she fell after using the bathroom. Pt has deformity to right thigh. Pt denies being on blood thinners. Pt states she doesn't remember the event but denies hitting her head. Pt used alert system to notify family who called EMS. EMS gave 50mcg fentanyl en route. Provider at bedside. Vacuum board removed per provider.

## 2021-02-04 NOTE — ANESTHESIA PRE PROCEDURE
Department of Anesthesiology  Preprocedure Note       Name:  Pema Lan   Age:  80 y.o.  :  1931                                          MRN:  [id number]         Date:  2021      Surgeon: Souleymane Guzman):  Angel Morris MD    Procedure: Procedure(s):  RIGHT INTERTAN    Medications prior to admission:   Prior to Admission medications    Medication Sig Start Date End Date Taking? Authorizing Provider   levETIRAcetam (KEPPRA) 500 MG tablet Take 500 mg in am and 250 mg at night. 20   JEFFERY Steven - CNP   furosemide (LASIX) 20 MG tablet Take 1 tablet by mouth daily as needed (leg swelling, shortness of breath, increase in daily weight > 3lbs.) 20   Vesta Albarran PA-C   budesonide (ENTOCORT EC) 3 MG extended release capsule Take 6 mg by mouth every morning    Historical Provider, MD   ondansetron (ZOFRAN) 8 MG tablet Take 8 mg by mouth every 8 hours as needed for Nausea or Vomiting    Historical Provider, MD   Magic Mouthwash (MIRACLE MOUTHWASH) Swish and spit 5 mLs 4 times daily as needed for Irritation    Historical Provider, MD   meclizine (ANTIVERT) 25 MG tablet Take 25 mg by mouth 3 times daily as needed    Historical Provider, MD   diphenoxylate-atropine (LOMOTIL) 2.5-0.025 MG per tablet Take 1 tablet by mouth 4 times daily as needed for Diarrhea. .    Historical Provider, MD   triamcinolone (KENALOG) 0.025 % ointment Apply topically 2 times daily Apply topically 2 times daily.     Historical Provider, MD   ZENPEP 51575 units CPEP Take by mouth 3 times daily (with meals)  17   Historical Provider, MD   KLOR-CON 10 10 MEQ extended release tablet Take 10 mEq by mouth 3 times daily Take 1 tablet in AM, 2 tablets at noon, and 1 tablet at supper 17   Historical Provider, MD   Psyllium (METAMUCIL FIBER PO) Take by mouth    Historical Provider, MD   loperamide (IMODIUM) 2 MG capsule Take 2 mg by mouth 4 times daily as needed for Diarrhea    Historical Provider, MD amLODIPine (NORVASC) 2.5 MG tablet Take 2.5 mg by mouth daily    Historical Provider, MD   Multiple Vitamin (MULTI VITAMIN PO) Take 1 tablet by mouth daily    Historical Provider, MD   pantoprazole (PROTONIX) 40 MG tablet Take 1 tablet by mouth 2 times daily  Patient taking differently: Take 40 mg by mouth daily  12/22/16   Jewel Velasquez MD   magnesium oxide (MAG-OX) 400 (240 MG) MG tablet Take 1 tablet by mouth daily 12/22/16   Jewel Velasquez MD   dicyclomine (BENTYL) 10 MG capsule Take 10 mg by mouth 3 times daily     Historical Provider, MD   atorvastatin (LIPITOR) 40 MG tablet Take 40 mg by mouth daily    Historical Provider, MD   metoprolol tartrate (LOPRESSOR) 25 MG tablet Take 25 mg by mouth 2 times daily    Historical Provider, MD   vitamin D3 (CHOLECALCIFEROL) 400 UNITS TABS tablet Take 400 Units by mouth daily    Historical Provider, MD   mirtazapine (REMERON) 15 MG tablet Take 15 mg by mouth nightly    Historical Provider, MD   QUEtiapine (SEROQUEL) 25 MG tablet Take 25 mg by mouth nightly     Historical Provider, MD   melatonin 3 MG TABS tablet Take 10 mg by mouth daily     Historical Provider, MD   cyanocobalamin 1000 MCG/ML injection Inject 1,000 mcg into the muscle every 30 days     Historical Provider, MD       Current medications:    Current Facility-Administered Medications   Medication Dose Route Frequency Provider Last Rate Last Admin    0.9 % sodium chloride infusion   Intravenous Continuous Vandana Lopes PA-C 75 mL/hr at 02/04/21 0820 New Bag at 02/04/21 0820    sodium chloride flush 0.9 % injection 10 mL  10 mL Intravenous 2 times per day Wendi Gold PA-C        sodium chloride flush 0.9 % injection 10 mL  10 mL Intravenous PRN Wendi Gold PA-C        acetaminophen (TYLENOL) tablet 650 mg  650 mg Oral Q6H Wendi Gold PA-C   650 mg at 02/04/21 0418    traMADol (ULTRAM) tablet 50 mg  50 mg Oral Q6H PRN Vandana Lopes PA-C  HYDROmorphone (DILAUDID) injection 0.25 mg  0.25 mg Intravenous Q3H PRN Wendi Gold PA-C        Or    HYDROmorphone (DILAUDID) injection 0.5 mg  0.5 mg Intravenous Q3H PRN Wendi Gold PA-C   0.5 mg at 02/04/21 0149    ondansetron (ZOFRAN) injection 4 mg  4 mg Intravenous Q6H PRN Wendi Gold PA-C        polyethylene glycol (GLYCOLAX) packet 17 g  17 g Oral Daily PRN Wendi Gold PA-C        bisacodyl (DULCOLAX) suppository 10 mg  10 mg Rectal Daily PRN Wendi Gold PA-C        cyclobenzaprine (FLEXERIL) tablet 5 mg  5 mg Oral TID PRN Antionette Santillan PA-C   5 mg at 02/04/21 0418    docusate sodium (COLACE) capsule 100 mg  100 mg Oral BID Wendi Gold PA-C        HYDROcodone-acetaminophen (NORCO) 5-325 MG per tablet 1 tablet  1 tablet Oral Q6H PRN Wendi Gold PA-C        HYDROcodone-acetaminophen (NORCO)  MG per tablet 1 tablet  1 tablet Oral Q6H PRN Wendi Gold PA-C        HYDROmorphone (DILAUDID) 1 MG/ML injection             amLODIPine (NORVASC) tablet 2.5 mg  2.5 mg Oral Daily [place], [place]        atorvastatin (LIPITOR) tablet 40 mg  40 mg Oral Daily [place], [place]        budesonide (ENTOCORT EC) extended release capsule 6 mg  6 mg Oral [facility] [place]        dicyclomine (BENTYL) capsule 10 mg  10 mg Oral TID [place]        diphenoxylate-atropine (LOMOTIL) 2.5-0.025 MG per tablet 1 tablet  1 tablet Oral 4x Daily PRN [place], [place]        levETIRAcetam (KEPPRA) tablet 500 mg  500 mg Oral Daily [place], [place]        magnesium oxide (MAG-OX) tablet 400 mg  400 mg Oral Daily [place], [place]        meclizine (ANTIVERT) tablet 25 mg  25 mg Oral TID PRN [place], [place]        melatonin tablet 10.5 mg  10.5 mg Oral Daily Malia Treadwell        metoprolol tartrate (LOPRESSOR) tablet 25 mg  25 mg Oral BID [facility] [place]  mirtazapine (REMERON) tablet 15 mg  15 mg Oral Nightly [facility], [place]        pantoprazole (PROTONIX) tablet 40 mg  40 mg Oral Daily [place], [place]        Psyllium 51.7 % PACK 1 packet  1 packet Oral Daily [place], [place]        QUEtiapine (SEROQUEL) tablet 25 mg  25 mg Oral Nightly [place], [place]        vitamin D3 (CHOLECALCIFEROL) tablet 400 Units  400 Units Oral Daily [place], [place]        Zenpep [id number] units CPEP 1 packet  1 packet Oral TID [facility], [place]        levETIRAcetam (KEPPRA) tablet 250 mg  250 mg Oral Nightly [place], [place]           Allergies:     Allergies   Allergen Reactions    Amoxil [Amoxicillin] Other (See Comments)     SORES IN MOUTH    Clindamycin/Lincomycin Other (See Comments)     SORES IN MOUTH    Lyrica [Pregabalin] Other (See Comments)     SORES IN MOUTH    Procardia [Nifedipine] Other (See Comments)     SORES IN MOUTH    Tenormin [Atenolol] Other (See Comments)     SORES IN MOUTH       Problem List:    Patient Active Problem List   Diagnosis Code    Partial seizure ([facility] R56.9    ARIS (acute kidney injury) ([facility] N17.9    CKD (chronic kidney disease) stage 3, GFR 30-59 ml/min N18.30    Benign essential HTN I10    Chronic pancreatitis ([facility] K86.1    Lesion of right native kidney N28.9    Chronic midline low back pain without sciatica M54.5, P99.67    Metabolic acidosis J93.7    Diarrhea R19.7    Chronic diarrhea of unknown origin K52.9    Acute renal failure superimposed on stage 3 chronic kidney disease (HCC) N17.9, N18.30    Severe protein-calorie malnutrition Franci Clonts: less than 60% of standard weight) ([facility]) E43    Fat malabsorption K90.49    Hypophosphatemia E83.39    Hypomagnesemia E83.42    Idiopathic chronic pancreatitis ([facility]) K86.1    Urinary tract infection without hematuria N39.0    Closed comminuted intertrochanteric fracture of proximal end of right femur, initial encounter [facility] I73.622F Past Medical History:        Diagnosis Date    Chronic pancreatitis (HCC)     Fibromyalgia     GERD (gastroesophageal reflux disease)     Hyperlipidemia     Hypertension     Low potassium syndrome     Osteoarthritis     Raynaud disease     Seizures (HCC)        Past Surgical History:        Procedure Laterality Date    APPENDECTOMY      CHOLECYSTECTOMY      COLONOSCOPY      EYE SURGERY      HERNIA REPAIR      HYSTERECTOMY      UPPER GASTROINTESTINAL ENDOSCOPY         Social History:    Social History     Tobacco Use    Smoking status: Never Smoker    Smokeless tobacco: Never Used   Substance Use Topics    Alcohol use: No                                Counseling given: Not Answered      Vital Signs (Current):   Vitals:    02/04/21 0151 02/04/21 0246 02/04/21 0326 02/04/21 0755   BP: (!) 141/73 (!) 142/70 (!) 155/70 (!) 145/66   Pulse: 76 81 83 86   Resp: 19 18 18 16   Temp:   97.8 °F (36.6 °C) 97.9 °F (36.6 °C)   TempSrc:   Oral Oral   SpO2: 100%  97%    Weight:       Height:                                                  BP Readings from Last 3 Encounters:   02/04/21 (!) 145/66   08/04/20 112/62   07/22/20 112/80       NPO Status:                                                                                 BMI:   Wt Readings from Last 3 Encounters:   02/03/21 113 lb (51.3 kg)   08/04/20 94 lb 14.4 oz (43 kg)   07/22/20 94 lb 6.4 oz (42.8 kg)     Body mass index is 22.82 kg/m².     CBC:   Lab Results   Component Value Date    WBC 9.6 02/04/2021    RBC 3.26 02/04/2021    HGB 9.5 02/04/2021    HCT 30.1 02/04/2021    MCV 92.3 02/04/2021    RDW 15.4 12/22/2016     02/04/2021       CMP:   Lab Results   Component Value Date     02/04/2021    K 4.3 02/04/2021     02/04/2021    CO2 22 02/04/2021    BUN 26 02/04/2021    CREATININE 1.0 02/04/2021    LABGLOM 52 02/04/2021    GLUCOSE 162 02/04/2021    PROT 6.6 02/03/2021    CALCIUM 8.8 02/04/2021    BILITOT 0.4 02/03/2021 ALKPHOS 139 02/03/2021    AST 19 02/03/2021    ALT 23 02/03/2021       POC Tests: No results for input(s): POCGLU, POCNA, POCK, POCCL, POCBUN, POCHEMO, POCHCT in the last 72 hours. Coags:   Lab Results   Component Value Date    INR 0.96 02/04/2021       HCG (If Applicable): No results found for: PREGTESTUR, PREGSERUM, HCG, HCGQUANT     ABGs: No results found for: PHART, PO2ART, XNH0KTM, GEC9SIQ, BEART, Z4SKWGSF     Type & Screen (If Applicable):  Lab Results   Component Value Date    LABRH POS 02/04/2021       Drug/Infectious Status (If Applicable):  No results found for: HIV, HEPCAB    COVID-19 Screening (If Applicable):   Lab Results   Component Value Date    COVID19 NOT DETECTED 02/04/2021    COVID19 NOT DETECTED 08/01/2020         Anesthesia Evaluation  Patient summary reviewed no history of anesthetic complications:   Airway: Mallampati: II  TM distance: >3 FB   Neck ROM: full  Mouth opening: > = 3 FB Dental: normal exam         Pulmonary:normal exam                               Cardiovascular:    (+) hypertension:, hyperlipidemia      ECG reviewed      Echocardiogram reviewed               ROS comment: RBBB       Neuro/Psych:   (+) seizures: well controlled, neuromuscular disease (Fibromyalgia):,             GI/Hepatic/Renal:   (+) GERD: well controlled, renal disease: CRI,           Endo/Other:    (+) : arthritis: OA., .                 Abdominal:           Vascular:                                        Anesthesia Plan      general     ASA 2       Induction: intravenous and rapid sequence. MIPS: Postoperative opioids intended and Prophylactic antiemetics administered. Anesthetic plan and risks discussed with patient, child/children and healthcare power of .       Plan discussed with CRNA and surgical team.                  Opal Maurer MD   2/4/2021

## 2021-02-04 NOTE — CONSULTS
[facility] Hospitalist Consult  History & Physical   Consult by Cristine Cuevas PA-C for preoperative clearance + medical management  2/3/2021 10:18 PM   Assessment and Plan:        1. Traumatic Right Femoral Neck Fracture (per XR) / Mechanical Fall From Standing  a. Will require PT/OT with frequent falls at home  b. Pain control and management per primary service  2. Preoperative Clearance  a. EKG shows NSR with RBBB. CXR unremarkable. b. Denies hx MI, CHF, CVA, DM, CP, COPD/Asthma, O2 use at home. c. Revised Cardiac Risk Index: (0.4%) low risk for perioperative cardiopulmonary complications. d. Patient understands the risk and is agreeable to surgery. 3. LLE cellulitis: likely 2/2 infected skin tear  a. Non diabetic and no discrete fluid collection palpated. No purulent drainage. b. Distal left shin to mid foot erythema, edema, warmth. Skin tear to distal anterior shin.  c. Currently receiving Ancef preoperatively. Will need to be transitioned to oral (Keflex) after surgery. RN to cornel the margins of erythema  4. Chronic Normocytic Anemia / Hx B12 deficiency, stable: unclear etiology. Receives B12 injections OP. Monitor especially postoperatively and transfuse for hgb < 7. Follow up OP  5. Chronic Isolated Alk Phos Elevation (), improving: first elevated 8/1/20. Follow up OP  6. Incidental Left Mastoiditis (per CT): no s/s of otitis externa. No fever or chills, WBC wnl. No pain to manipulation of pinna or percussion of the mastoid bone. Patient educated that if she should develop sx should follow up with ENT/PCP as OP. 7. Steroid use: unclear indication / duration. RN to call daughter. Will resume budesonide 6 mg (equivalent to 20mg prednisone) daily. Consider stress dose steroids with moderate HPA suppression if she has been taking for > 3 weeks. 8. Essential Hypertension, controlled: resume amlodipine. 9. HLD: resume statin  10.  GERD: resume PPI, consider switching to H2-inhibitor with B12 def.   11. Raynaud's Disease: Resume CCB. 12. Fibromyalgia / Insomnia: resume seroquel, mirtazapine, melatonin. 13. Chronic Pancreatitis: resume Zenpep, psyllium, prn loperamide. 14. Seizure Disorder: follows with Dr. Latoya Duffy. resume home keppra        CC:  Right Hip Pain  HPI: Angie Álvarez is an 80year old white female nonsmoker with PMH of fibromyalgia, GERD, HLD, HTN, Raynaud disease, seizures who presented to [facility] ED on 2/4/21 c/o fall from standing after getting out of bed in the middle of the night to go to the restroom in the dark and without her walker. She landed on her right side. Denies LOC, use of anticoagulation, head trauma. She used her LifeAlert and was brought to the ED via EMS. She denies CP, light-headedness, SOB prior to falling. In the ED, vitals showed: HR 77, /93, RR 13, 100% on RA and afebrile. Labs showed: Cr 1.1, BUN 26, , troponin neg, Alkphos 139, hgb 10.1, MCV 91.6, INR 0.96, COVID neg, UA clean. CT head shows: No acute findings. Left mastoiditis. CT c-spine shows no acute findings. ROS: A 14 point ROS was performed and was negative other than the pertinent positives noted in the HPI  PMH:  Per HPI  SHX:  Never smoked. Denies drug use  FHX: Arthritis in her father; Cancer in her mother; Hearing Loss in her father; Heart Disease in her maternal grandmother; High Blood Pressure in her maternal aunt, maternal grandmother, and mother; Stroke in her father. Allergies: moxil [amoxicillin]; clindamycin/lincomycin; lyrica [pregabalin]; procardia [nifedipine]; and tenormin [atenolol].   Medications:     sodium chloride 75 mL/hr at 02/04/21 0420      sodium chloride flush  10 mL Intravenous 2 times per day    acetaminophen  650 mg Oral Q6H    ceFAZolin (ANCEF) IVPB  2,000 mg Intravenous On Call to OR    docusate sodium  100 mg Oral BID    HYDROmorphone           Vital Signs:   BP (!) 155/70   Pulse 83   Temp 97.8 °F (36.6 °C) (Oral)   Resp 18   Ht 4' 11\" (1.499 m)   Wt 113 lb (51.3 kg)   SpO2 97%   BMI 22.82 kg/m²    No intake or output data in the 24 hours ending 02/04/21 0430     General:  White female well groomed, well-nourished, well-developed who appears stated age, in no acute distress lying in bed. Head: Normocephalic and atraumatic. EENT: No exophthalmos noted. No scleral or conjunctiva icterus, injection or pallor noted. No tenderness to percussion of mastoid bone, manipulation of pinna or drainage from external auditory canal.   Neck: Supple. Trachea midline. No thyromegaly. Thorax/Lungs: Thorax is symmetrical with good expansion. Breath sounds CTA and equal b/l without rales, wheezing, or rhonchi. No retractions or use of abdominal muscles. Cardiac: S1, S2, RRR without murmur, rub, or gallop. No JVD  Abdomen: Abdomen soft, nontender to palpation, without guarding or rigidity. Normoactive BS. Peripheral Vasculature: Extremities warm, dry with 2+ pitting edema of LLE, no varicosities or stasis changes, DP pulses 2+ b/l. Brisk capillary refill. Skin:  Skin warm and dry. LLE with edema, erythema and warmth from mid shin to midfoot. Skin tear to distal anterior shin. Psych:  Alert and oriented x3. Affect appropriate  Neurologic: No focal deficits. No Seizures.      Data:   Labs:   Results for orders placed or performed during the hospital encounter of 02/03/21   CBC auto differential   Result Value Ref Range    WBC 13.5 (H) 4.8 - 10.8 thou/mm3    RBC 3.44 (L) 4.20 - 5.40 mill/mm3    Hemoglobin 10.1 (L) 12.0 - 16.0 gm/dl    Hematocrit 31.5 (L) 37.0 - 47.0 %    MCV 91.6 81.0 - 99.0 fL    MCH 29.4 26.0 - 33.0 pg    MCHC 32.1 (L) 32.2 - 35.5 gm/dl    RDW-CV 16.1 (H) 11.5 - 14.5 %    RDW-SD 53.5 (H) 35.0 - 45.0 fL    Platelets 397 086 - 971 thou/mm3    MPV 8.6 (L) 9.4 - 12.4 fL    Seg Neutrophils 82.9 %    Lymphocytes 9.4 %    Monocytes 6.4 %    Eosinophils 0.1 %    Basophils 0.2 %    Immature Granulocytes 1.0 %    Segs Absolute 11.2 (H) 1.8 - 7.7 thou/mm3    Lymphocytes Absolute 1.3 1.0 - 4.8 thou/mm3    Monocytes Absolute 0.9 0.4 - 1.3 thou/mm3    Eosinophils Absolute 0.0 0.0 - 0.4 thou/mm3    Basophils Absolute 0.0 0.0 - 0.1 thou/mm3    Immature Grans (Abs) 0.13 (H) 0.00 - 0.07 thou/mm3    nRBC 0 /100 wbc   Basic Metabolic Panel   Result Value Ref Range    Sodium 134 (L) 135 - 145 meq/L    Potassium 4.1 3.5 - 5.2 meq/L    Chloride 102 98 - 111 meq/L    CO2 22 (L) 23 - 33 meq/L    Glucose 156 (H) 70 - 108 mg/dL    BUN 26 (H) 7 - 22 mg/dL    CREATININE 1.1 0.4 - 1.2 mg/dL    Calcium 8.9 8.5 - 10.5 mg/dL   Hepatic function panel   Result Value Ref Range    Albumin 3.7 3.5 - 5.1 g/dL    Total Bilirubin 0.4 0.3 - 1.2 mg/dL    Bilirubin, Direct <0.2 0.0 - 0.3 mg/dL    Alkaline Phosphatase 139 (H) 38 - 126 U/L    AST 19 5 - 40 U/L    ALT 23 11 - 66 U/L    Total Protein 6.6 6.1 - 8.0 g/dL   Lipase   Result Value Ref Range    Lipase 26.0 5.6 - 51.3 U/L   Troponin   Result Value Ref Range    Troponin T < 0.010 ng/ml   Magnesium   Result Value Ref Range    Magnesium 2.1 1.6 - 2.4 mg/dL   Anion Gap   Result Value Ref Range    Anion Gap 10.0 8.0 - 16.0 meq/L   Glomerular Filtration Rate, Estimated   Result Value Ref Range    Est, Glom Filt Rate 47 (A) ml/min/1.73m2   Osmolality   Result Value Ref Range    Osmolality Calc 276.2 275.0 - 300.0 mOsmol/kg   COVID-19   Result Value Ref Range    SARS-CoV-2, NAAT NOT DETECTED NOT DETECTED   Urine with Reflexed Micro   Result Value Ref Range    Glucose, Ur NEGATIVE NEGATIVE mg/dl    Bilirubin Urine NEGATIVE NEGATIVE    Ketones, Urine NEGATIVE NEGATIVE    Specific Gravity, Urine 1.013 1.002 - 1.030    Blood, Urine NEGATIVE NEGATIVE    pH, UA 6.5 5.0 - 9.0    Protein, UA TRACE (A) NEGATIVE    Urobilinogen, Urine 0.2 0.0 - 1.0 eu/dl    Nitrite, Urine NEGATIVE NEGATIVE    Leukocyte Esterase, Urine TRACE (A) NEGATIVE    Color, UA YELLOW STRAW-YELLOW    Character, Urine CLEAR CLEAR-SL CLOUD    RBC, UA NONE SEEN 0-2/hpf /hpf WBC, UA 2-4 0-4/hpf /hpf    Epithelial Cells, UA 0-2 3-5/hpf /hpf    Bacteria, UA NONE SEEN FEW/NONE SEEN /hpf    Casts UA NONE SEEN NONE SEEN /lpf    Crystals, UA NONE SEEN NONE SEEN    Renal Epithelial, UA NONE SEEN NONE SEEN    Yeast, UA NONE SEEN NONE SEEN    CASTS 2 NONE SEEN NONE SEEN /lpf    MISCELLANEOUS 2 NONE SEEN    CBC   Result Value Ref Range    WBC 9.6 4.8 - 10.8 thou/mm3    RBC 3.26 (L) 4.20 - 5.40 mill/mm3    Hemoglobin 9.5 (L) 12.0 - 16.0 gm/dl    Hematocrit 30.1 (L) 37.0 - 47.0 %    MCV 92.3 81.0 - 99.0 fL    MCH 29.1 26.0 - 33.0 pg    MCHC 31.6 (L) 32.2 - 35.5 gm/dl    RDW-CV 16.0 (H) 11.5 - 14.5 %    RDW-SD 53.9 (H) 35.0 - 45.0 fL    Platelets 201 264 - 412 thou/mm3    MPV 8.4 (L) 9.4 - 12.4 fL   Protime-INR   Result Value Ref Range    INR 0.96 0.85 - 2.73   Basic Metabolic Panel w/ Reflex to MG   Result Value Ref Range    Sodium 135 135 - 145 meq/L    Potassium reflex Magnesium 4.3 3.5 - 5.2 meq/L    Chloride 104 98 - 111 meq/L    CO2 22 (L) 23 - 33 meq/L    Glucose 162 (H) 70 - 108 mg/dL    BUN 26 (H) 7 - 22 mg/dL    CREATININE 1.0 0.4 - 1.2 mg/dL    Calcium 8.8 8.5 - 10.5 mg/dL   Anion Gap   Result Value Ref Range    Anion Gap 9.0 8.0 - 16.0 meq/L   Glomerular Filtration Rate, Estimated   Result Value Ref Range    Est, Glom Filt Rate 52 (A) ml/min/1.73m2   Osmolality   Result Value Ref Range    Osmolality Calc 278.4 275.0 - 300.0 mOsmol/kg   EKG Fall   Result Value Ref Range    Ventricular Rate 74 BPM    Atrial Rate 74 BPM    P-R Interval 142 ms    QRS Duration 114 ms    Q-T Interval 426 ms    QTc Calculation (Bazett) 472 ms    P Axis 42 degrees    R Axis 29 degrees    T Axis -16 degrees       EKG / Radiology:     EKG:  Reviewed by me: NSR with RBBB    CXR:   Reviewed by me: No acute findings. Xr Pelvis (1-2 Views)    Result Date: 2/3/2021  ** ADDENDUM #1 ** This report was discussed with Hunga File on Feb 03, 2021 23:08:00 EST.  This document has been electronically signed by: Katiana Sewell Deborah on 02/03/2021 11:08 PM ** ORIGINAL REPORT ** Pelvis, 1 view COMPARISON: None FINDINGS: Angulated intertrochanteric right femoral neck fracture. No dislocation. Unremarkable soft tissues. Right femoral neck fracture. This document has been electronically signed by: Mitul Alejandre MD on 02/03/2021 11:05 PM     Xr Femur Right (min 2 Views)    Result Date: 2/3/2021  Right femur, 2 views COMPARISON: None FINDINGS: Angulated intertrochanteric right femoral neck fracture. No dislocation. Unremarkable soft tissues. Right femoral neck fracture. This document has been electronically signed by: Mitul Alejandre MD on 02/03/2021 11:10 PM     Ct Head Wo Contrast    Result Date: 2/3/2021  CT Head WO Contrast COMPARISON:  MR  - MRI BRAIN B STEM W/WO CONTR  - 10/18/2010 01:45 PM EDT FINDINGS: No acute intracranial hemorrhage. No evidence of acute infarction. Diffuse cortical volume loss. Nonspecific white matter hypodensities most commonly associated with chronic microangiopathic changes. No mass-effect or midline shift. No hydrocephalus. Visualized orbits are normal. Small fluid in the paranasal sinuses. Fluid in the left mastoid air cells. No acute fracture. Unremarkable soft tissues. No acute intracranial findings. Left mastoiditis. Nonemergent/incidental findings in the report. This document has been electronically signed by: Mitul Alejandre MD on 02/03/2021 11:21 PM All CT scans at this facility use dose modulation, iterative reconstruction, and/or weight-based dosing when appropriate to reduce radiation dose to as low as reasonably achievable. Ct Cervical Spine Wo Contrast    Result Date: 2/3/2021  CT Cervical Spine WO Contrast COMPARISON: None FINDINGS: No acute fracture or malalignment. Mild degenerative changes in the spine. Soft tissues are normal. Lung apices are clear. No acute findings.  This document has been electronically signed by: Mitul Alejandre MD on 02/03/2021 11:22 PM All CT scans at this facility use dose modulation, iterative reconstruction, and/or weight-based dosing when appropriate to reduce radiation dose to as low as reasonably achievable. Xr Chest 1 View    Result Date: 2/3/2021  Chest X-ray, 1 View COMPARISON:  CR,SR  - XR CHEST PORTABLE  - 08/01/2020 05:00 AM EDT FINDINGS: No consolidation. No pleural effusion. No pneumothorax. No cardiomegaly. No acute fracture. No acute findings.  This document has been electronically signed by: Tania Alex MD on 02/03/2021 11:09 PM       Case and Plan discussed with Mak Donovan MD  Electronically signed by Malia Pride on 2/4/2021 at 4:30 AM

## 2021-02-04 NOTE — OP NOTE
Timeout was taken, consent was confirmed. The patient received 2 gram of Ancef preoperatively. Started with 3.2 mm guide pin on the medial face of the greater trochanter and was then advanced under fluoroscopic guidance. The incision size was increased. A 16 mm channel reamer was then passed down to the level of the lesser trochanter. We placed the 10 mm x 340 mm Intertan nail down to the appropriate depth. We used the jig and a 3.2 mm guide pin placed in the center of the femoral head. We then used a dimpler followed by a cannulated reamer,the rigid reamer and the anti-rotation bar. We placed a 80 mm lag screw, 75 mm compression screw. Good compression noted across the fracture site. Traction was removed and then placed a 32.5 mm distal locking screw with a free-handed technique under fluoroscopic guidance. We injected local anesthetic including morphine and Toradol. Wounds were irrigated and closed with 2-0 Vicryl and Prineo. A skin tear occurred removing the drape at the distal locking screw. Dry dressings were applied. The patient was then awakened and returned to recovery room in fair condition. POSTOPERATIVE PLAN: Weight bearing as tolerated, dry dressings p.r.n. First postop visit will be in 8 weeks x-rays, 2 views of the Right femur . The physician assistant assisted throughout the procedure with positioning, draping, retraction, wound closure, and dressings application.       Jayme Castelan MD               Electronically signed by Jayme Castelan MD on 2/4/2021 at 10:08 AM

## 2021-02-04 NOTE — ED PROVIDER NOTES
[facility]  eMERGENCY dEPARTMENT eNCOUnter          CHIEF COMPLAINT       Chief Complaint   Patient presents with    Fall       Nurses Notes reviewed and I agree except as noted in the HPI. HISTORY OF PRESENT ILLNESS    Janel Monsivais is a 80 y.o. female who presents with right hip pain. Apparently the patient got up out of bed and was going to the restroom she slipped and fell. She does not remember the fall. She does not think she lost consciousness. She never had any chest pain or shortness of breath prior to the fall. She was laying on her side. Her right hip was hurting. She had a life alert, which she pressed who called her son who came over and then called the squad. Patient presents with a deformity at the right hip pelvis and femur area. She is able to feel sensation distally down her leg and into her feet and toes. Patient denies any chest pain shortness of breath at this time. She has no fevers chills sweats. She has no abdominal pain or changes in bowel or bladder habits. Patient states that the last time she ate was at 4:30 PM today. Currently the patient is resting comfortably on the cot in mild to moderate amount of pain. REVIEW OF SYSTEMS     Review of Systems   Constitutional: Negative for activity change, appetite change, diaphoresis, fatigue and unexpected weight change. HENT: Negative for congestion, ear discharge, ear pain, hearing loss, rhinorrhea, sinus pressure, sore throat, trouble swallowing and voice change. Eyes: Negative for photophobia, pain, discharge, redness and itching. Respiratory: Negative for cough, choking, chest tightness, shortness of breath and wheezing. Cardiovascular: Negative for chest pain, palpitations and leg swelling. Gastrointestinal: Negative for abdominal distention, abdominal pain, blood in stool, constipation, diarrhea, nausea and vomiting. Endocrine: Negative for polydipsia, polyphagia and polyuria. Genitourinary: Negative for decreased urine volume, difficulty urinating, dysuria, enuresis, frequency, hematuria and urgency. Musculoskeletal: Positive for arthralgias, joint swelling and myalgias. Negative for gait problem, neck pain and neck stiffness. Skin: Negative for pallor and rash. Allergic/Immunologic: Negative for immunocompromised state. Neurological: Positive for weakness. Negative for dizziness, tremors, seizures, syncope, facial asymmetry, light-headedness, numbness and headaches. Hematological: Negative for adenopathy. Does not bruise/bleed easily. Psychiatric/Behavioral: Negative for agitation, hallucinations and suicidal ideas. The patient is not nervous/anxious. PAST MEDICAL HISTORY    has a past medical history of Chronic pancreatitis ([facility] [address] Fibromyalgia, GERD (gastroesophageal reflux disease), Hyperlipidemia, Hypertension, Low potassium syndrome, Osteoarthritis, Raynaud disease, and Seizures ([facility] [address] SURGICAL HISTORY      has a past surgical history that includes Appendectomy; Cholecystectomy; eye surgery; hernia repair; Hysterectomy; Colonoscopy; and Upper gastrointestinal endoscopy. CURRENT MEDICATIONS       Current Discharge Medication List      CONTINUE these medications which have NOT CHANGED    Details   levETIRAcetam (KEPPRA) 500 MG tablet Take 500 mg in am and 250 mg at night.   Qty: 135 tablet, Refills: 3    Associated Diagnoses: Seizure disorder (HCC)      furosemide (LASIX) 20 MG tablet Take 1 tablet by mouth daily as needed (leg swelling, shortness of breath, increase in daily weight > 3lbs.)  Qty: 60 tablet, Refills: 0      budesonide (ENTOCORT EC) 3 MG extended release capsule Take 6 mg by mouth every morning      ondansetron (ZOFRAN) 8 MG tablet Take 8 mg by mouth every 8 hours as needed for Nausea or Vomiting      Magic Mouthwash (MIRACLE MOUTHWASH) Swish and spit 5 mLs 4 times daily as needed for Irritation      meclizine (ANTIVERT) 25 MG tablet Take 25 mg by mouth 3 times daily as needed      diphenoxylate-atropine (LOMOTIL) 2.5-0.025 MG per tablet Take 1 tablet by mouth 4 times daily as needed for Diarrhea. .      triamcinolone (KENALOG) 0.025 % ointment Apply topically 2 times daily Apply topically 2 times daily. ZENPEP 33633 units CPEP Take by mouth 3 times daily (with meals)       KLOR-CON 10 10 MEQ extended release tablet Take 10 mEq by mouth 3 times daily Take 1 tablet in AM, 2 tablets at noon, and 1 tablet at supper      Psyllium (METAMUCIL FIBER PO) Take by mouth      loperamide (IMODIUM) 2 MG capsule Take 2 mg by mouth 4 times daily as needed for Diarrhea      amLODIPine (NORVASC) 2.5 MG tablet Take 2.5 mg by mouth daily      Multiple Vitamin (MULTI VITAMIN PO) Take 1 tablet by mouth daily      pantoprazole (PROTONIX) 40 MG tablet Take 1 tablet by mouth 2 times daily  Qty: 30 tablet, Refills: 3      magnesium oxide (MAG-OX) 400 (240 MG) MG tablet Take 1 tablet by mouth daily  Qty: 30 tablet, Refills: 0      dicyclomine (BENTYL) 10 MG capsule Take 10 mg by mouth 3 times daily       atorvastatin (LIPITOR) 40 MG tablet Take 40 mg by mouth daily      metoprolol tartrate (LOPRESSOR) 25 MG tablet Take 25 mg by mouth 2 times daily      vitamin D3 (CHOLECALCIFEROL) 400 UNITS TABS tablet Take 400 Units by mouth daily      mirtazapine (REMERON) 15 MG tablet Take 15 mg by mouth nightly      QUEtiapine (SEROQUEL) 25 MG tablet Take 25 mg by mouth nightly       melatonin 3 MG TABS tablet Take 10 mg by mouth daily       cyanocobalamin 1000 MCG/ML injection Inject 1,000 mcg into the muscle every 30 days              ALLERGIES     is allergic to amoxil [amoxicillin]; clindamycin/lincomycin; lyrica [pregabalin]; procardia [nifedipine]; and tenormin [atenolol]. FAMILY HISTORY     She indicated that the status of her mother is unknown. She indicated that the status of her father is unknown. She indicated that the status of her maternal grandmother is unknown.  She indicated that the status of her maternal aunt is unknown.   family history includes Arthritis in her father; Cancer in her mother; Hearing Loss in her father; Heart Disease in her maternal grandmother; High Blood Pressure in her maternal aunt, maternal grandmother, and mother; Stroke in her father. SOCIAL HISTORY      reports that she has never smoked. She has never used smokeless tobacco. She reports that she does not drink alcohol or use drugs. PHYSICAL EXAM     INITIAL VITALS:  height is 4' 11\" (1.499 m) and weight is 113 lb (51.3 kg). Her oral temperature is 97.8 °F (36.6 °C). Her blood pressure is 155/70 (abnormal) and her pulse is 83. Her respiration is 18 and oxygen saturation is 97%. Physical Exam  Vitals signs and nursing note reviewed. Constitutional:       General: She is not in acute distress. Appearance: She is well-developed. She is not diaphoretic. HENT:      Head: Normocephalic and atraumatic. Right Ear: External ear normal.      Left Ear: External ear normal.      Nose: Nose normal.      Mouth/Throat:      Pharynx: No oropharyngeal exudate. Eyes:      General: No scleral icterus. Right eye: No discharge. Left eye: No discharge. Conjunctiva/sclera: Conjunctivae normal.      Pupils: Pupils are equal, round, and reactive to light. Neck:      Musculoskeletal: Normal range of motion and neck supple. Thyroid: No thyromegaly. Vascular: No JVD. Trachea: No tracheal deviation. Cardiovascular:      Rate and Rhythm: Regular rhythm. Tachycardia present. Pulses:           Carotid pulses are 2+ on the right side and 2+ on the left side. Radial pulses are 2+ on the right side and 2+ on the left side. Femoral pulses are 2+ on the right side and 2+ on the left side. Popliteal pulses are 2+ on the right side and 2+ on the left side. Dorsalis pedis pulses are 2+ on the right side and 2+ on the left side.         Posterior tibial pulses are 2+ on the right side and 2+ on the left side. Heart sounds: Normal heart sounds, S1 normal and S2 normal. No murmur. No friction rub. No gallop. Pulmonary:      Effort: Pulmonary effort is normal.      Breath sounds: Normal breath sounds. No stridor. No decreased breath sounds, wheezing, rhonchi or rales. Chest:      Chest wall: No tenderness. Abdominal:      General: Bowel sounds are normal. There is no distension. Palpations: Abdomen is soft. There is no mass. Tenderness: There is no abdominal tenderness. There is no guarding or rebound. Negative signs include Marti's sign, Rovsing's sign, McBurney's sign, psoas sign and obturator sign. Hernia: No hernia is present. Musculoskeletal:      Right hip: She exhibits decreased range of motion, decreased strength, tenderness, bony tenderness, swelling and deformity. She exhibits no crepitus and no laceration. Right lower leg: No edema. Left lower le+ Edema present. Lymphadenopathy:      Cervical: No cervical adenopathy. Skin:     General: Skin is warm and dry. Findings: No bruising, ecchymosis, lesion or rash. Neurological:      Mental Status: She is alert and oriented to person, place, and time. Cranial Nerves: No cranial nerve deficit. Coordination: Coordination normal.      Deep Tendon Reflexes: Reflexes are normal and symmetric. Psychiatric:         Speech: Speech normal.         Behavior: Behavior normal.         Thought Content:  Thought content normal.         Judgment: Judgment normal.           DIFFERENTIAL DIAGNOSIS:   Hip fracture femur fracture pelvic fracture cervical fracture intracranial injury skull fracture    DIAGNOSTIC RESULTS     EKG: All EKG's are interpreted by the Emergency Department Physician who either signs or Co-signs this chart in the absence of a cardiologist.  EKG revealed normal sinus rhythm, normal axis, right bundle branch block, ventricular rate of 74 MO interval 142 QRS duration 114 QT interval 426 QTC of 472. RADIOLOGY: non-plain film images(s) such as CT, Ultrasound and MRI are read by the radiologist.  CT CERVICAL SPINE WO CONTRAST   Final Result   No acute findings. This document has been electronically signed by: Lyn Mai MD on    02/03/2021 11:22 PM      All CT scans at this facility use dose modulation, iterative    reconstruction, and/or weight-based   dosing when appropriate to reduce radiation dose to as low as reasonably    achievable. CT HEAD WO CONTRAST   Final Result   No acute intracranial findings. Left mastoiditis. Nonemergent/incidental findings in the report. This document has been electronically signed by: Lyn Mai MD on    02/03/2021 11:21 PM      All CT scans at this facility use dose modulation, iterative    reconstruction, and/or weight-based   dosing when appropriate to reduce radiation dose to as low as reasonably    achievable. XR FEMUR RIGHT (MIN 2 VIEWS)   Final Result   Right femoral neck fracture. This document has been electronically signed by: Lyn Mai MD on    02/03/2021 11:10 PM         XR PELVIS (1-2 VIEWS)   Final Result   Right femoral neck fracture. This document has been electronically signed by: Lyn Mai MD on    02/03/2021 11:05 PM         XR CHEST 1 VIEW   Final Result   No acute findings.       This document has been electronically signed by: Lyn Mai MD on    02/03/2021 11:09 PM             LABS:   Labs Reviewed   CBC WITH AUTO DIFFERENTIAL - Abnormal; Notable for the following components:       Result Value    WBC 13.5 (*)     RBC 3.44 (*)     Hemoglobin 10.1 (*)     Hematocrit 31.5 (*)     MCHC 32.1 (*)     RDW-CV 16.1 (*)     RDW-SD 53.5 (*)     MPV 8.6 (*)     Segs Absolute 11.2 (*)     Immature Grans (Abs) 0.13 (*)     All other components within normal limits   BASIC METABOLIC PANEL - Abnormal; Notable for the following components:    Sodium 134 (*) CO2 22 (*)     Glucose 156 (*)     BUN 26 (*)     All other components within normal limits   HEPATIC FUNCTION PANEL - Abnormal; Notable for the following components:    Alkaline Phosphatase 139 (*)     All other components within normal limits   GLOMERULAR FILTRATION RATE, ESTIMATED - Abnormal; Notable for the following components:    Est, Glom Filt Rate 47 (*)     All other components within normal limits   URINE WITH REFLEXED MICRO - Abnormal; Notable for the following components:    Protein, UA TRACE (*)     Leukocyte Esterase, Urine TRACE (*)     All other components within normal limits   CBC - Abnormal; Notable for the following components:    RBC 3.26 (*)     Hemoglobin 9.5 (*)     Hematocrit 30.1 (*)     MCHC 31.6 (*)     RDW-CV 16.0 (*)     RDW-SD 53.9 (*)     MPV 8.4 (*)     All other components within normal limits   BASIC METABOLIC PANEL W/ REFLEX TO MG FOR LOW K - Abnormal; Notable for the following components:    CO2 22 (*)     Glucose 162 (*)     BUN 26 (*)     All other components within normal limits   GLOMERULAR FILTRATION RATE, ESTIMATED - Abnormal; Notable for the following components:    Est, Glom Filt Rate 52 (*)     All other components within normal limits   LIPASE   TROPONIN   MAGNESIUM   ANION GAP   OSMOLALITY   COVID-19   PROTIME-INR   ANION GAP   OSMOLALITY   MRSA BY PCR   TYPE AND SCREEN       EMERGENCY DEPARTMENT COURSE:   Vitals:    Vitals:    02/04/21 0054 02/04/21 0151 02/04/21 0246 02/04/21 0326   BP: (!) 141/73 (!) 141/73 (!) 142/70 (!) 155/70   Pulse: 86 76 81 83   Resp: 20 19 18 18   Temp:    97.8 °F (36.6 °C)   TempSrc:    Oral   SpO2: 100% 100%  97%   Weight:       Height:         Patient was assessed at bedside appropriate labs and imaging ordered. Patient was given fluid and fentanyl. Patient has an obvious deformity. Appropriate labs were ordered. Patient is distally neurovascularly intact. X-ray here showed a femoral neck fracture.   Patient is slightly elevated white blood cell count however she was afebrile and hemodynamically stable. The rest of her labs look apparently normal for her. At this point I called orthopedics and spoke with Maribel Puente, with orthopedics, discussed case with them. They graciously accepted the admission. Patient is admitted in stable condition pending further evaluation and treatment by orthopedics. CRITICAL CARE:   None    CONSULTS:  Orthopedics    PROCEDURES:  None    FINAL IMPRESSION      1. Closed right hip fracture, initial encounter ([facility] [address]    2. Fall, initial encounter          DISPOSITION/PLAN   Admission    PATIENT REFERRED TO:  No follow-up provider specified.     DISCHARGE MEDICATIONS:  Current Discharge Medication List          (Please note that portions of this note were completed with a voice recognition program.  Efforts were made to edit the dictations but occasionally words are mis-transcribed.)    DO Lucy Toure DO  02/04/21 3430

## 2021-02-04 NOTE — ANESTHESIA POSTPROCEDURE EVALUATION
02/04/21 1040 (!) 156/76   84 13 100 %   02/04/21 1035 (!) 160/71   86 15 100 %   02/04/21 1030 (!) 163/76   81 13 100 %   02/04/21 1025 (!) 162/76   84 15 100 %   02/04/21 1020 (!) 162/76  Temporal 88 20 100 %       Level of Consciousness:  Awake    Respiratory:  Stable    Oxygen Saturation:  Stable    Cardiovascular:  Stable    Hydration:  Adequate    PONV:  Stable    Post-op Pain:  Adequate analgesia    Post-op Assessment:  No apparent anesthetic complications    Additional Follow-Up / Treatment / Comment:  None    Dona Guerrero MD  February 4, 2021   1:57 PM

## 2021-02-04 NOTE — ED NOTES
ED to inpatient nurses report    Chief Complaint   Patient presents with    Fall      Present to ED from home  LOC: alert and orientated to name, place, date  Vital signs   Vitals:    02/03/21 2353 02/04/21 0054 02/04/21 0151 02/04/21 0246   BP: (!) 142/73 (!) 141/73 (!) 141/73 (!) 142/70   Pulse: 77 86 76 81   Resp: 19 20 19 18   Temp:       TempSrc:       SpO2: 100% 100% 100%    Weight:       Height:          Oxygen Baseline room air    Current needs required room air   LDAs:   Peripheral IV 02/03/21 Right Hand (Active)   Site Assessment Clean;Dry; Intact 02/03/21 2226   Dressing Status Clean;Dry; Intact 02/03/21 2226   Dressing Intervention New 02/03/21 2226     Mobility: Fully dependent related to fracture  Pending ED orders: none  Present condition: Stable. Pt has had adequate pain control with PRN medication.  She has been using external catheter appropriately       Electronically signed by Lashay Barnard RN on 2/4/2021 at 2:50 AM       Lashay Barnard RN  02/04/21 9628

## 2021-02-04 NOTE — H&P
History and Physical        CHIEF COMPLAINT:  Right hip pain    HISTORY OF PRESENT ILLNESS:      The patient is a 80 y.o. female with multiple medical comorbidities who ortho admitted for definitive treatment of a right femoral neck fracture sustained from a fall in her home. Patient lives independently, she is supposed to walk with a walker, but forgot to use it last evening and unfortunately had a fall. She endorses right hip pain. She denies other injuries and no LOC. X-rays confirm the fracture    Past Medical History:    Past Medical History:   Diagnosis Date    Chronic pancreatitis (HCC)     Fibromyalgia     GERD (gastroesophageal reflux disease)     Hyperlipidemia     Hypertension     Low potassium syndrome     Osteoarthritis     Raynaud disease     Seizures (HCC)        Past Surgical History:    Past Surgical History:   Procedure Laterality Date    APPENDECTOMY      CHOLECYSTECTOMY      COLONOSCOPY      EYE SURGERY      HERNIA REPAIR      HYSTERECTOMY      UPPER GASTROINTESTINAL ENDOSCOPY         Medications Prior to Admission:   Prior to Admission medications    Medication Sig Start Date End Date Taking? Authorizing Provider   levETIRAcetam (KEPPRA) 500 MG tablet Take 500 mg in am and 250 mg at night.  9/16/20   EJFFERY Salazar - CNP   furosemide (LASIX) 20 MG tablet Take 1 tablet by mouth daily as needed (leg swelling, shortness of breath, increase in daily weight > 3lbs.) 8/4/20   Moisés Montgomery PA-C   budesonide (ENTOCORT EC) 3 MG extended release capsule Take 6 mg by mouth every morning    Historical Provider, MD   ondansetron (ZOFRAN) 8 MG tablet Take 8 mg by mouth every 8 hours as needed for Nausea or Vomiting    Historical Provider, MD   Magic Mouthwash (MIRACLE MOUTHWASH) Swish and spit 5 mLs 4 times daily as needed for Irritation    Historical Provider, MD   meclizine (ANTIVERT) 25 MG tablet Take 25 mg by mouth 3 times daily as needed    Historical Provider, MD diphenoxylate-atropine (LOMOTIL) 2.5-0.025 MG per tablet Take 1 tablet by mouth 4 times daily as needed for Diarrhea. .    Historical Provider, MD   triamcinolone (KENALOG) 0.025 % ointment Apply topically 2 times daily Apply topically 2 times daily.     Historical Provider, MD   ZENPEP 60706 units CPEP Take by mouth 3 times daily (with meals)  11/20/17   Historical Provider, MD   KLOR-CON 10 10 MEQ extended release tablet Take 10 mEq by mouth 3 times daily Take 1 tablet in AM, 2 tablets at noon, and 1 tablet at supper 12/19/17   Historical Provider, MD   Psyllium (METAMUCIL FIBER PO) Take by mouth    Historical Provider, MD   loperamide (IMODIUM) 2 MG capsule Take 2 mg by mouth 4 times daily as needed for Diarrhea    Historical Provider, MD   amLODIPine (NORVASC) 2.5 MG tablet Take 2.5 mg by mouth daily    Historical Provider, MD   Multiple Vitamin (MULTI VITAMIN PO) Take 1 tablet by mouth daily    Historical Provider, MD   pantoprazole (PROTONIX) 40 MG tablet Take 1 tablet by mouth 2 times daily  Patient taking differently: Take 40 mg by mouth daily  12/22/16   Mateus Juarez MD   magnesium oxide (MAG-OX) 400 (240 MG) MG tablet Take 1 tablet by mouth daily 12/22/16   Mateus Juarez MD   dicyclomine (BENTYL) 10 MG capsule Take 10 mg by mouth 3 times daily     Historical Provider, MD   atorvastatin (LIPITOR) 40 MG tablet Take 40 mg by mouth daily    Historical Provider, MD   metoprolol tartrate (LOPRESSOR) 25 MG tablet Take 25 mg by mouth 2 times daily    Historical Provider, MD   vitamin D3 (CHOLECALCIFEROL) 400 UNITS TABS tablet Take 400 Units by mouth daily    Historical Provider, MD   mirtazapine (REMERON) 15 MG tablet Take 15 mg by mouth nightly    Historical Provider, MD   QUEtiapine (SEROQUEL) 25 MG tablet Take 25 mg by mouth nightly     Historical Provider, MD   melatonin 3 MG TABS tablet Take 10 mg by mouth daily     Historical Provider, MD   cyanocobalamin 1000 MCG/ML injection Inject 1,000 mcg into the 100 % -- --   02/03/21 2225 (!) 133/93 98 °F (36.7 °C) Oral 77 13 100 % 4' 11\" (1.499 m) 113 lb (51.3 kg)     General appearance:  Alert and oriented x 3. No apparent distress, appears stated age and cooperative. HEENT:  Normal cephalic, atraumatic without obvious deformity. Pupils equal, round, and reactive to light. Conjunctivae/corneas clear. Neck: Supple, with full range of motion. Trachea midline. Respiratory:  Normal respiratory effort. No audible Wheezes or Rhonchi. Cardiovascular:  Regular rate and rhythm. Abdomen: Soft, non-tender, non-distended. Musculoskeletal:  RLE skin intact. Flex and extends toes and ankle. Compartments soft. SILT  Skin: Skin color, texture, turgor normal.  No rashes or lesions. Neurologic:  Neurovascularly intact without any focal sensory/motor deficits. Sensation intact. DATA:  CBC:   Lab Results   Component Value Date    WBC 9.6 02/04/2021    HGB 9.5 02/04/2021     02/04/2021     BMP:    Lab Results   Component Value Date     02/04/2021    K 4.3 02/04/2021     02/04/2021    CO2 22 02/04/2021    BUN 26 02/04/2021    CREATININE 1.0 02/04/2021    CALCIUM 8.8 02/04/2021    GLUCOSE 162 02/04/2021     PT/INR:    Lab Results   Component Value Date    INR 0.96 02/04/2021     Troponin:  No results found for: TROPONINI  Recent Labs     02/03/21  2331   LIPASE 26.0     Recent Labs     02/03/21  2331   AST 19   ALT 23   BILIDIR <0.2   BILITOT 0.4   ALKPHOS 139*       Radiology:  Xr Pelvis (1-2 Views)    Result Date: 2/3/2021   This report was discussed with Yessenia Mathur on Feb 03, 2021 23:08:00 EST. This document has been electronically signed by: Valente Sosa on 02/03/2021 11:08 PMlvis, 1 view COMPARISON: None FINDINGS: Angulated intertrochanteric right femoral neck fracture. No dislocation. Unremarkable soft tissues. Right femoral neck fracture.  This document has been electronically signed by: Roseanna Humphries MD on 02/03/2021 11:05 PM     Xr Femur Right (min 2 Views)    Result Date: 2/3/2021  Right femur, 2 views COMPARISON: None FINDINGS: Angulated intertrochanteric right femoral neck fracture. No dislocation. Unremarkable soft tissues. Right femoral neck fracture. This document has been electronically signed by: Mckenna Harper MD on 02/03/2021 11:10 PM     Ct Head Wo Contrast    Result Date: 2/3/2021  CT Head WO Contrast COMPARISON:  MR  - MRI BRAIN B STEM W/WO CONTR  - 10/18/2010 01:45 PM EDT FINDINGS: No acute intracranial hemorrhage. No evidence of acute infarction. Diffuse cortical volume loss. Nonspecific white matter hypodensities most commonly associated with chronic microangiopathic changes. No mass-effect or midline shift. No hydrocephalus. Visualized orbits are normal. Small fluid in the paranasal sinuses. Fluid in the left mastoid air cells. No acute fracture. Unremarkable soft tissues. No acute intracranial findings. Left mastoiditis. Nonemergent/incidental findings in the report. This document has been electronically signed by: Mckenna Harper MD on 02/03/2021 11:21 PM All CT scans at this facility use dose modulation, iterative reconstruction, and/or weight-based dosing when appropriate to reduce radiation dose to as low as reasonably achievable. Ct Cervical Spine Wo Contrast    Result Date: 2/3/2021  CT Cervical Spine WO Contrast COMPARISON: None FINDINGS: No acute fracture or malalignment. Mild degenerative changes in the spine. Soft tissues are normal. Lung apices are clear. No acute findings. This document has been electronically signed by: Mckenna Harper MD on 02/03/2021 11:22 PM All CT scans at this facility use dose modulation, iterative reconstruction, and/or weight-based dosing when appropriate to reduce radiation dose to as low as reasonably achievable. Xr Chest 1 View    Result Date: 2/3/2021  Chest X-ray, 1 View COMPARISON:  CR,SR  - XR CHEST PORTABLE  - 08/01/2020 05:00 AM EDT FINDINGS: No consolidation.  No pleural effusion. No pneumothorax. No cardiomegaly. No acute fracture. No acute findings. This document has been electronically signed by: Nito Jacome MD on 02/03/2021 11:09 PM       ASSESSMENT:Active Problems:    Closed comminuted intertrochanteric fracture of proximal end of right femur, initial encounter [facility])  Resolved Problems:    * No resolved hospital problems. *       PLAN as discussed with Dr. Judy Rodriguez:  -surgery today for ORIF right hip.  -discussed code status with POA, consent for full code for surgery. -hold acs  -consent signed. -medicine to manage medical comorbidities    The patient was counseled at length about the risks of betty Covid-19 during their perioperative period and any recovery window from their procedure. The patient was made aware that betty Covid-19  may worsen their prognosis for recovering from their procedure  and lend to a higher morbidity and/or mortality risk. All material risks, benefits, and reasonable alternatives including postponing the procedure were discussed. The patient does wish to proceed with the procedure at this time.          Electronically signed by JEFFERY Sarabia CNP on 2/4/2021 at 8:07 AM

## 2021-02-04 NOTE — PROGRESS NOTES
Pharmacy Renal Adjustment    Milena Maya is a 80 y.o. female. Pharmacy renally adjust the following medications per P&T approved policy: Famotidine    Recent Labs     02/03/21  2331 02/04/21  0343   BUN 26* 26*       Recent Labs     02/03/21  2331 02/04/21  0343   CREATININE 1.1 1.0       CrCl cannot be calculated (Unknown ideal weight.).   Calculated CrCl: 30 ml/minute     Height:   Ht Readings from Last 1 Encounters:   02/03/21 4' 11\" (1.499 m)     Weight:  Wt Readings from Last 1 Encounters:   02/03/21 113 lb (51.3 kg)       Plan: Adjustments based on renal function:           Decrease Famotidine 40 mg nightly to 20 mg nightly             Decrease Lovenox 40 mg daily X 21 days to 30 mg daily X 21 days     Herber Connors PharmD 2/4/2021 1:11 PM

## 2021-02-04 NOTE — CARE COORDINATION
DISCHARGE/PLANNING EVALUATION  2/4/21, 4:51 PM EST    Reason for Referral: discharge planning  Mental Status: Patient is alert and oriented  Decision Making: Patient is making own decisions. Family/Social/Home Environment: Assessment completed with son and daughter in law. Patient underwent ORIF of right hip today and was sleeping after surgery. Patient is from home in a one story home with some family assistance. Patient was independent with all daily ADL's prior to admission. Patient does have a cleaning lady and someone who cares for her yard. Patient was agreeable to ECF at discharge and has previously been to [facility]. Current Services including food security, transportation and housekeeping:  See above  Current Equipment: walker  Payment Source: medicare, [employer]  Concerns or Barriers to Discharge: not at this time   Post acute provider list with quality measures, geographic area and applicable managed care information provided. Questions regarding selection process answered: offered and declined, has been to [facility] in the past and prefer again. Teach Back Method used with Patient regarding care plan and discharge plan. Patient and family verbalize understanding of the plan of care and contribute to goal setting. Patient goals, treatment preferences and discharge plan:  Patient will need rehab at discharge and prefers [facility].  Message left with Celsa Israel regarding referral.     Electronically signed by ALLEN Clark, SELENE on 2/4/2021 at 4:51 PM

## 2021-02-04 NOTE — CARE COORDINATION
DISASTER CHARTING    2/4/21, 7:49 AM EST    DISCHARGE ONGOING EVALUATION:     Truman Dickey day: 0  Location: 6K-11/011-A Reason for admit: Closed comminuted intertrochanteric fracture of proximal end of right femur, initial encounter ([facility] [address] [S78.707N]   Barriers to Discharge: IVF, pain control. Planning OR today. PCP: Bhavani Jerry DO  Readmission Risk Score: 22%  Patient Goals/Plan/Treatment Preferences: From home alone. Has life alert system. SW consulted.

## 2021-02-04 NOTE — PROGRESS NOTES
Pt admitted to  6K11. Complaints: right hip pain. IV normal saline infusing into the right hand with about 250 mls in the bag still. IV site free of s/s of infection or infiltration. Vital signs obtained. Assessment and data collection initiated. Two nurse skin assessment performed by Francisco Javier Arnold and [facility] RN. Oriented to room. Policies and procedures for 6K explained. Bernard Browne RN discussed hourly rounding with patient addressing 5 P's. Fall prevention and safety brochure discussed with patient. Bed alarm on. Call light in reach. The best day to schedule a follow up Dr appointment is:  Tuesday PM. Explained patients right to have family, representative or physician notified of their admission. Patient has declined for physician to be notified. Patient has declined for family/representative to be notified. All questions answered with no further questions at this time.

## 2021-02-04 NOTE — PLAN OF CARE
Problem: DISCHARGE BARRIERS  Goal: Patient's continuum of care needs are met  Description: Patient will need ECF rehab at discharge. See  notes 2/4/21. Outcome: Ongoing  Note: Patient will need rehab at discharge. See  notes 2/4/21.

## 2021-02-04 NOTE — PROGRESS NOTES
Ice applied to patient's right hip area. Patient positioned to lying left side to help alleviate pain and maintain skin integrity.

## 2021-02-04 NOTE — PROGRESS NOTES
This RN notified by PACU nurse report that during surgery patient developed skin tear to right mid leg. Upon arrival to unit skin tear is wrapped, under wrapping is moderate sized skin tear with minimal bleeding. Family stated the surgeon notified them already, looked at skin tear with family.

## 2021-02-04 NOTE — PROGRESS NOTES
Pharmacy Medication History Note      List of current medications patient is taking is complete. Source of information: daughter, handwritten medication list     Changes made to medication list:  Medications removed (include reason, ex. therapy complete or physician discontinued):   Bentyl  Lomotil  Imodium   Magnesium Oxide  Melatonin 3 mg  Protonix 40 mg BID  Metamucil   Vitamin D 400 units   Zenpep 40,000 units  Miralax PRN   Gaviscon BID PRN  APAP 325 mg PRN       Medications added/doses adjusted:  Potassium Chloride 10 mEq- changed to 2 tablets daily   Meclizine 25 mg- changed to 12.5 mg Q8H PRN   Melatonin 10mg- take 1/2 tablet nightly   Protonix 40 mg QAM   Vitamin D 1000 units daily   Zenpep 20,000 units 2 tablets TID meals   Famotidine 40 mg nightly    Doxycycline 100 mg BID X 10 days, started 2/3/2021    Electronically signed by Parish Vasquez, [address] [place] [address] on 2/4/2021 at 11:25 AM

## 2021-02-05 LAB
ACANTHOCYTES: ABNORMAL
ANION GAP SERPL CALCULATED.3IONS-SCNC: 10 MEQ/L (ref 8–16)
BASOPHILIA: ABNORMAL
BASOPHILS # BLD: 0.3 %
BASOPHILS ABSOLUTE: 0 THOU/MM3 (ref 0–0.1)
BUN BLDV-MCNC: 32 MG/DL (ref 7–22)
CALCIUM SERPL-MCNC: 8.2 MG/DL (ref 8.5–10.5)
CHLORIDE BLD-SCNC: 111 MEQ/L (ref 98–111)
CO2: 18 MEQ/L (ref 23–33)
CREAT SERPL-MCNC: 1.4 MG/DL (ref 0.4–1.2)
CRENATED RBC'S: ABNORMAL
DACROCYTES: ABNORMAL
DIFFERENTIAL TYPE: ABNORMAL
ELLIPTOCYTES: ABNORMAL
EOSINOPHIL # BLD: 0.5 %
EOSINOPHILS ABSOLUTE: 0 THOU/MM3 (ref 0–0.4)
ERYTHROCYTE [DISTWIDTH] IN BLOOD BY AUTOMATED COUNT: 16.6 % (ref 11.5–14.5)
ERYTHROCYTE [DISTWIDTH] IN BLOOD BY AUTOMATED COUNT: 57.4 FL (ref 35–45)
GFR SERPL CREATININE-BSD FRML MDRD: 35 ML/MIN/1.73M2
GLUCOSE BLD-MCNC: 127 MG/DL (ref 70–108)
HCT VFR BLD CALC: 20.1 % (ref 37–47)
HCT VFR BLD CALC: 25.3 % (ref 37–47)
HEMOGLOBIN: 6.1 GM/DL (ref 12–16)
HEMOGLOBIN: 7.7 GM/DL (ref 12–16)
IMMATURE GRANS (ABS): 0.05 THOU/MM3 (ref 0–0.07)
IMMATURE GRANULOCYTES: 0.6 %
LYMPHOCYTES # BLD: 21.6 %
LYMPHOCYTES ABSOLUTE: 1.7 THOU/MM3 (ref 1–4.8)
MCH RBC QN AUTO: 29 PG (ref 26–33)
MCHC RBC AUTO-ENTMCNC: 30.3 GM/DL (ref 32.2–35.5)
MCV RBC AUTO: 95.7 FL (ref 81–99)
MONOCYTES # BLD: 11.4 %
MONOCYTES ABSOLUTE: 0.9 THOU/MM3 (ref 0.4–1.3)
NUCLEATED RED BLOOD CELLS: 0 /100 WBC
PATHOLOGIST REVIEW: ABNORMAL
PLATELET # BLD: 166 THOU/MM3 (ref 130–400)
PLATELET ESTIMATE: ADEQUATE
PMV BLD AUTO: 8.7 FL (ref 9.4–12.4)
POIKILOCYTES: ABNORMAL
POTASSIUM SERPL-SCNC: 4.8 MEQ/L (ref 3.5–5.2)
RBC # BLD: 2.1 MILL/MM3 (ref 4.2–5.4)
REASON FOR REJECTION: NORMAL
REJECTED TEST: NORMAL
SCAN OF BLOOD SMEAR: NORMAL
SEG NEUTROPHILS: 65.6 %
SEGMENTED NEUTROPHILS ABSOLUTE COUNT: 5.1 THOU/MM3 (ref 1.8–7.7)
SODIUM BLD-SCNC: 139 MEQ/L (ref 135–145)
WBC # BLD: 7.8 THOU/MM3 (ref 4.8–10.8)

## 2021-02-05 PROCEDURE — 6370000000 HC RX 637 (ALT 250 FOR IP): Performed by: PHYSICIAN ASSISTANT

## 2021-02-05 PROCEDURE — 6370000000 HC RX 637 (ALT 250 FOR IP): Performed by: NURSE PRACTITIONER

## 2021-02-05 PROCEDURE — 2580000003 HC RX 258: Performed by: NURSE PRACTITIONER

## 2021-02-05 PROCEDURE — 6370000000 HC RX 637 (ALT 250 FOR IP): Performed by: PHARMACIST

## 2021-02-05 PROCEDURE — 6360000002 HC RX W HCPCS: Performed by: NURSE PRACTITIONER

## 2021-02-05 PROCEDURE — P9016 RBC LEUKOCYTES REDUCED: HCPCS

## 2021-02-05 PROCEDURE — 36430 TRANSFUSION BLD/BLD COMPNT: CPT

## 2021-02-05 PROCEDURE — 85014 HEMATOCRIT: CPT

## 2021-02-05 PROCEDURE — 1200000000 HC SEMI PRIVATE

## 2021-02-05 PROCEDURE — 85025 COMPLETE CBC W/AUTO DIFF WBC: CPT

## 2021-02-05 PROCEDURE — 80048 BASIC METABOLIC PNL TOTAL CA: CPT

## 2021-02-05 PROCEDURE — 36415 COLL VENOUS BLD VENIPUNCTURE: CPT

## 2021-02-05 PROCEDURE — 85018 HEMOGLOBIN: CPT

## 2021-02-05 RX ORDER — CEPHALEXIN 500 MG/1
500 CAPSULE ORAL EVERY 12 HOURS SCHEDULED
Status: DISCONTINUED | OUTPATIENT
Start: 2021-02-05 | End: 2021-02-09 | Stop reason: HOSPADM

## 2021-02-05 RX ORDER — SODIUM CHLORIDE 9 MG/ML
INJECTION, SOLUTION INTRAVENOUS PRN
Status: DISCONTINUED | OUTPATIENT
Start: 2021-02-05 | End: 2021-02-09 | Stop reason: HOSPADM

## 2021-02-05 RX ORDER — CEPHALEXIN 500 MG/1
500 CAPSULE ORAL EVERY 6 HOURS SCHEDULED
Status: DISCONTINUED | OUTPATIENT
Start: 2021-02-05 | End: 2021-02-05

## 2021-02-05 RX ADMIN — CEPHALEXIN 500 MG: 500 CAPSULE ORAL at 23:54

## 2021-02-05 RX ADMIN — QUETIAPINE FUMARATE 25 MG: 25 TABLET ORAL at 20:31

## 2021-02-05 RX ADMIN — HYDROCODONE BITARTRATE AND ACETAMINOPHEN 1 TABLET: 10; 325 TABLET ORAL at 20:07

## 2021-02-05 RX ADMIN — SODIUM CHLORIDE: 9 INJECTION, SOLUTION INTRAVENOUS at 23:37

## 2021-02-05 RX ADMIN — FAMOTIDINE 20 MG: 20 TABLET, FILM COATED ORAL at 20:31

## 2021-02-05 RX ADMIN — LEVETIRACETAM 250 MG: 250 TABLET, FILM COATED ORAL at 20:32

## 2021-02-05 RX ADMIN — Medication 1000 UNITS: at 11:38

## 2021-02-05 RX ADMIN — CEFAZOLIN 2000 MG: 10 INJECTION, POWDER, FOR SOLUTION INTRAVENOUS at 01:55

## 2021-02-05 RX ADMIN — ACETAMINOPHEN 650 MG: 325 TABLET ORAL at 04:17

## 2021-02-05 RX ADMIN — HYDROCODONE BITARTRATE AND ACETAMINOPHEN 1 TABLET: 10; 325 TABLET ORAL at 11:34

## 2021-02-05 RX ADMIN — PANCRELIPASE LIPASE, PANCRELIPASE PROTEASE, PANCRELIPASE AMYLASE 40000 UNITS: 10000; 32000; 42000 CAPSULE, DELAYED RELEASE ORAL at 11:33

## 2021-02-05 RX ADMIN — SODIUM CHLORIDE: 9 INJECTION, SOLUTION INTRAVENOUS at 04:24

## 2021-02-05 RX ADMIN — MIRTAZAPINE 15 MG: 15 TABLET, FILM COATED ORAL at 20:32

## 2021-02-05 RX ADMIN — CYCLOBENZAPRINE 5 MG: 10 TABLET, FILM COATED ORAL at 11:38

## 2021-02-05 RX ADMIN — PANTOPRAZOLE SODIUM 40 MG: 40 TABLET, DELAYED RELEASE ORAL at 11:34

## 2021-02-05 RX ADMIN — HYDROMORPHONE HYDROCHLORIDE 0.25 MG: 1 INJECTION, SOLUTION INTRAMUSCULAR; INTRAVENOUS; SUBCUTANEOUS at 17:50

## 2021-02-05 RX ADMIN — METOPROLOL TARTRATE 25 MG: 25 TABLET ORAL at 20:32

## 2021-02-05 RX ADMIN — BUDESONIDE 6 MG: 3 CAPSULE, GELATIN COATED ORAL at 11:36

## 2021-02-05 RX ADMIN — SODIUM CHLORIDE, PRESERVATIVE FREE 10 ML: 5 INJECTION INTRAVENOUS at 11:42

## 2021-02-05 RX ADMIN — LEVETIRACETAM 500 MG: 500 TABLET, FILM COATED ORAL at 11:33

## 2021-02-05 RX ADMIN — ATORVASTATIN CALCIUM 40 MG: 40 TABLET, FILM COATED ORAL at 20:32

## 2021-02-05 RX ADMIN — Medication 4.5 MG: at 20:32

## 2021-02-05 ASSESSMENT — PAIN DESCRIPTION - PROGRESSION: CLINICAL_PROGRESSION: NOT CHANGED

## 2021-02-05 ASSESSMENT — PAIN SCALES - GENERAL
PAINLEVEL_OUTOF10: 10
PAINLEVEL_OUTOF10: 3
PAINLEVEL_OUTOF10: 0

## 2021-02-05 ASSESSMENT — PAIN DESCRIPTION - ORIENTATION: ORIENTATION: RIGHT

## 2021-02-05 ASSESSMENT — PAIN DESCRIPTION - PAIN TYPE: TYPE: SURGICAL PAIN

## 2021-02-05 ASSESSMENT — PAIN DESCRIPTION - DESCRIPTORS: DESCRIPTORS: ACHING;CONSTANT

## 2021-02-05 ASSESSMENT — PAIN SCALES - WONG BAKER
WONGBAKER_NUMERICALRESPONSE: 6

## 2021-02-05 ASSESSMENT — PAIN DESCRIPTION - FREQUENCY: FREQUENCY: CONTINUOUS

## 2021-02-05 ASSESSMENT — PAIN - FUNCTIONAL ASSESSMENT: PAIN_FUNCTIONAL_ASSESSMENT: PREVENTS OR INTERFERES WITH MANY ACTIVE NOT PASSIVE ACTIVITIES

## 2021-02-05 NOTE — PROGRESS NOTES
Orthopaedic Progress Note      SUBJECTIVE:    Chief Complaint   Patient presents with    Fall   POD 1 right IT   Seen in bed, denies n/t  hgb 6.1 acute blood loss anemia. Physical    Vitals:    02/05/21 0730   BP: (!) 104/54   Pulse: 82   Resp: 16   Temp: 97.8 °F (36.6 °C)   SpO2: 94%         OBJECTIVE  RLE dressing c/d/i. Flex and extends toes and ankle. ROM stable. Compartments soft. Minimal swelling.  NVI foot      Data  CBC:   Lab Results   Component Value Date    WBC 7.8 02/05/2021    RBC 2.10 02/05/2021    HGB 6.1 02/05/2021    HCT 20.1 02/05/2021    MCV 95.7 02/05/2021    MCH 29.0 02/05/2021    MCHC 30.3 02/05/2021    RDW 15.4 12/22/2016     02/05/2021    MPV 8.7 02/05/2021     BMP:    Lab Results   Component Value Date     02/05/2021    K 4.8 02/05/2021    K 4.3 02/04/2021     02/05/2021    CO2 18 02/05/2021    BUN 32 02/05/2021    LABALBU 3.7 02/03/2021    CREATININE 1.4 02/05/2021    CALCIUM 8.2 02/05/2021    LABGLOM 35 02/05/2021    GLUCOSE 127 02/05/2021     Uric Acid:  No components found for: URIC  PT/INR:    Lab Results   Component Value Date    INR 0.96 02/04/2021     PTT:  No results found for: APTT, PTT[APTT  Troponin:  No results found for: TROPONINI  Urine Culture:  No components found for: CURINE    Current Inpatient Medications    Current Facility-Administered Medications: 0.9 % sodium chloride infusion, , Intravenous, PRN  cephALEXin (KEFLEX) capsule 500 mg, 500 mg, Oral, 4 times per day  0.9 % sodium chloride infusion, , Intravenous, Continuous  sodium chloride flush 0.9 % injection 10 mL, 10 mL, Intravenous, 2 times per day  sodium chloride flush 0.9 % injection 10 mL, 10 mL, Intravenous, PRN  acetaminophen (TYLENOL) tablet 650 mg, 650 mg, Oral, Q6H  traMADol (ULTRAM) tablet 50 mg, 50 mg, Oral, Q6H PRN  HYDROmorphone (DILAUDID) injection 0.25 mg, 0.25 mg, Intravenous, Q3H PRN **OR** HYDROmorphone (DILAUDID) injection 0.5 mg, 0.5 mg, Intravenous, Q3H PRN  ondansetron

## 2021-02-05 NOTE — PROGRESS NOTES
Pharmacy Renal Adjustment    Janel Monsivais is a 80 y.o. female. Pharmacy renally adjust the following medications per P&T approved policy: Cephalexin    Recent Labs     02/04/21  0343 02/05/21  0611   BUN 26* 32*       Recent Labs     02/04/21  0343 02/05/21  0611   CREATININE 1.0 1.4*       CrCl cannot be calculated (Unknown ideal weight.).   Calculated CrCl: 21 ml/minute using adjusted body weight = 49.9 kg     Height:   Ht Readings from Last 1 Encounters:   02/03/21 4' 11\" (1.499 m)     Weight:  Wt Readings from Last 1 Encounters:   02/05/21 124 lb 9.6 oz (56.5 kg)       Baseline SCr: 1.1    Plan: Adjustments based on renal function:          Decrease Cephalexin to 500 mg PO Q12H     Venkat Villa PharmD 2/5/2021 9:02 AM

## 2021-02-05 NOTE — CARE COORDINATION
2/5/21, 9:41 AM EST    DISCHARGE PLANNING EVALUATION      Spoke with [facility] admissions. Facility can accept when ready for discharge.

## 2021-02-05 NOTE — CARE COORDINATION
DISASTER CHARTING    2/5/21, 8:44 AM EST    DISCHARGE ONGOING EVALUATION:     Marilyn Marcus day: 1  Location: [facility]11/011-A Reason for admit: Closed comminuted intertrochanteric fracture of proximal end of right femur, initial encounter ([facility] [address] [S72.141A]   Barriers to Discharge: POD 1 s/p right femur ORIF with CM nail. Hgb 6.1, creat 1.4. IVF, PRBC ordered, pain control. PT/OT. PCP: Yulia Jerry DO  Readmission Risk Score: 28%  Patient Goals/Plan/Treatment Preferences: Referral made to [facility].

## 2021-02-05 NOTE — DISCHARGE INSTR - COC
Continuity of Care Form    Patient Name: Randy Crespo   :  1931  MRN:  [id number]    Admit date:  2/3/2021  Discharge date:  2021    Code Status Order: Limited   Advance Directives:   [address] [facility] Documentation       Date/Time Healthcare Directive Type of Healthcare Directive Copy in [address] Agent's Name Healthcare Agent's Phone Number    [phone]  Yes, patient has an advance directive for healthcare treatment  Durable power of  for health care;Living will  Yes, copy in chart  Healthcare power of   Gisel Nieto - son  [phone]            Admitting Physician:  Fredrick Cruz MD  PCP: Althea Gimenez DO    Discharging Nurse: [facility] Unit/Room#: 6K-11/011-A  Discharging Unit Phone Number: [phone]    Emergency Contact:   Extended Emergency Contact Information  Primary Emergency Contact: Julio C Anne  Address:     [place]           [address] Phone: [phone]  Mobile Phone: [phone]  Relation: Child    Past Surgical History:  Past Surgical History:   Procedure Laterality Date    APPENDECTOMY      CHOLECYSTECTOMY      COLONOSCOPY      EYE SURGERY      HERNIA REPAIR      HYSTERECTOMY      UPPER GASTROINTESTINAL ENDOSCOPY         Immunization History:   Immunization History   Administered Date(s) Administered    Influenza Vaccine, unspecified formulation 2016    Influenza, High Dose (Fluzone 65 yrs and older) 10/19/2017       Active Problems:  Patient Active Problem List   Diagnosis Code    Partial seizure ([address] R56.9    ARIS (acute kidney injury) ([address] N17.9    CKD (chronic kidney disease) stage 3, GFR 30-59 ml/min N18.30    Benign essential HTN I10    Chronic pancreatitis ([address] K86.1    Lesion of right native kidney N28.9    Chronic midline low back pain without sciatica M54.5, P34.48    Metabolic acidosis O81.7    Diarrhea R19.7    0354   Dressing/Treatment Non adherent 02/05/21 0354   Wound Assessment Pink/red;Slough 02/05/21 0354   Drainage Amount Scant 02/05/21 0354   Drainage Description Serosanguinous; Yellow 02/05/21 0354   Odor None 02/04/21 1221   Brina-wound Assessment Warm 02/04/21 1221   Number of days: 1       Wound Leg Right Skin tear  (Active)   Wound Etiology Skin Tear 02/04/21 2106   Dressing Status Clean;Dry; Intact 02/04/21 1231   Wound Assessment Pink/red 02/05/21 0354   Drainage Amount Small 02/04/21 1231   Drainage Description Serosanguinous 02/04/21 1231   Odor None 02/04/21 1231   Brina-wound Assessment Blanchable erythema;Fragile 02/04/21 1231   Number of days:         Elimination:  Continence:   · Bowel: Yes  · Bladder: No  Urinary Catheter: None   Colostomy/Ileostomy/Ileal Conduit: No       Date of Last BM: 2/3/2021    Intake/Output Summary (Last 24 hours) at 2/5/2021 1039  Last data filed at 2/5/2021 0354  Gross per 24 hour   Intake 1594.26 ml   Output 100 ml   Net 1494.26 ml     I/O last 3 completed shifts: In: 2094.3 [P.O.:80; I.V.:2014.3]  Out: 115 [Urine:100; Blood:15]    Safety Concerns:     History of Falls (last 30 days)    Impairments/Disabilities:      Vision and Hearing    Nutrition Therapy:  Current Nutrition Therapy:   - Oral Diet:  General    Routes of Feeding: Oral  Liquids: No Restrictions  Daily Fluid Restriction: no  Last Modified Barium Swallow with Video (Video Swallowing Test): not done    Treatments at the Time of Hospital Discharge:   Respiratory Treatments: N/A  Oxygen Therapy:  is not on home oxygen therapy. Ventilator:    - No ventilator support    Rehab Therapies: Physical Therapy and Occupational Therapy  Weight Bearing Status/Restrictions: No weight bearing restirctions  Other Medical Equipment (for information only, NOT a DME order):   LAHTI Steady   Other Treatments: N/A    Patient's personal belongings (please select all that are sent with patient):  Glasses, Hearing Aides bilateral    RN

## 2021-02-05 NOTE — PROGRESS NOTES
[facility]  PHYSICAL THERAPY MISSED TREATMENT NOTE  [facility] RENAL TELEMETRY 6K    Date: 2021  Patient Name: Delicia Schafer        MRN: [id number]   : 1931  (80 y.o.)  Gender: female                REASON FOR MISSED TREATMENT:  Hold treatment per nursing request.  Pt had to have blood transfusion this morning and RN recommended to wait until tomorrow morning to start. Amy Tapia.  Collie Libman, [address]

## 2021-02-06 ENCOUNTER — APPOINTMENT (OUTPATIENT)
Dept: GENERAL RADIOLOGY | Age: 86
DRG: 481 | End: 2021-02-06
Payer: MEDICARE

## 2021-02-06 LAB
ANION GAP SERPL CALCULATED.3IONS-SCNC: 6 MEQ/L (ref 8–16)
BASOPHILS # BLD: 0.4 %
BASOPHILS ABSOLUTE: 0 THOU/MM3 (ref 0–0.1)
BUN BLDV-MCNC: 24 MG/DL (ref 7–22)
CALCIUM SERPL-MCNC: 7.5 MG/DL (ref 8.5–10.5)
CHLORIDE BLD-SCNC: 114 MEQ/L (ref 98–111)
CO2: 18 MEQ/L (ref 23–33)
CREAT SERPL-MCNC: 1.1 MG/DL (ref 0.4–1.2)
EOSINOPHIL # BLD: 1.7 %
EOSINOPHILS ABSOLUTE: 0.1 THOU/MM3 (ref 0–0.4)
ERYTHROCYTE [DISTWIDTH] IN BLOOD BY AUTOMATED COUNT: 16.1 % (ref 11.5–14.5)
ERYTHROCYTE [DISTWIDTH] IN BLOOD BY AUTOMATED COUNT: 55.4 FL (ref 35–45)
GFR SERPL CREATININE-BSD FRML MDRD: 47 ML/MIN/1.73M2
GLUCOSE BLD-MCNC: 86 MG/DL (ref 70–108)
HCT VFR BLD CALC: 25.2 % (ref 37–47)
HEMOGLOBIN: 7.9 GM/DL (ref 12–16)
IMMATURE GRANS (ABS): 0.13 THOU/MM3 (ref 0–0.07)
IMMATURE GRANULOCYTES: 1.9 %
LYMPHOCYTES # BLD: 27.2 %
LYMPHOCYTES ABSOLUTE: 1.9 THOU/MM3 (ref 1–4.8)
MCH RBC QN AUTO: 29.4 PG (ref 26–33)
MCHC RBC AUTO-ENTMCNC: 31.3 GM/DL (ref 32.2–35.5)
MCV RBC AUTO: 93.7 FL (ref 81–99)
MONOCYTES # BLD: 11.8 %
MONOCYTES ABSOLUTE: 0.8 THOU/MM3 (ref 0.4–1.3)
NUCLEATED RED BLOOD CELLS: 0 /100 WBC
PLATELET # BLD: 149 THOU/MM3 (ref 130–400)
PMV BLD AUTO: 8.7 FL (ref 9.4–12.4)
POTASSIUM SERPL-SCNC: 3.6 MEQ/L (ref 3.5–5.2)
RBC # BLD: 2.69 MILL/MM3 (ref 4.2–5.4)
SEG NEUTROPHILS: 57 %
SEGMENTED NEUTROPHILS ABSOLUTE COUNT: 3.9 THOU/MM3 (ref 1.8–7.7)
SODIUM BLD-SCNC: 138 MEQ/L (ref 135–145)
WBC # BLD: 6.9 THOU/MM3 (ref 4.8–10.8)

## 2021-02-06 PROCEDURE — 6360000002 HC RX W HCPCS: Performed by: NURSE PRACTITIONER

## 2021-02-06 PROCEDURE — 6370000000 HC RX 637 (ALT 250 FOR IP): Performed by: PHARMACIST

## 2021-02-06 PROCEDURE — 6370000000 HC RX 637 (ALT 250 FOR IP): Performed by: NURSE PRACTITIONER

## 2021-02-06 PROCEDURE — 97163 PT EVAL HIGH COMPLEX 45 MIN: CPT

## 2021-02-06 PROCEDURE — 85025 COMPLETE CBC W/AUTO DIFF WBC: CPT

## 2021-02-06 PROCEDURE — 97530 THERAPEUTIC ACTIVITIES: CPT

## 2021-02-06 PROCEDURE — 2580000003 HC RX 258: Performed by: NURSE PRACTITIONER

## 2021-02-06 PROCEDURE — 6370000000 HC RX 637 (ALT 250 FOR IP): Performed by: PHYSICIAN ASSISTANT

## 2021-02-06 PROCEDURE — 73502 X-RAY EXAM HIP UNI 2-3 VIEWS: CPT

## 2021-02-06 PROCEDURE — 80048 BASIC METABOLIC PNL TOTAL CA: CPT

## 2021-02-06 PROCEDURE — 36415 COLL VENOUS BLD VENIPUNCTURE: CPT

## 2021-02-06 PROCEDURE — 1200000000 HC SEMI PRIVATE

## 2021-02-06 RX ADMIN — PANCRELIPASE LIPASE, PANCRELIPASE PROTEASE, PANCRELIPASE AMYLASE 40000 UNITS: 10000; 32000; 42000 CAPSULE, DELAYED RELEASE ORAL at 16:16

## 2021-02-06 RX ADMIN — METOPROLOL TARTRATE 25 MG: 25 TABLET ORAL at 21:47

## 2021-02-06 RX ADMIN — SODIUM CHLORIDE, PRESERVATIVE FREE 10 ML: 5 INJECTION INTRAVENOUS at 21:48

## 2021-02-06 RX ADMIN — PANTOPRAZOLE SODIUM 40 MG: 40 TABLET, DELAYED RELEASE ORAL at 10:55

## 2021-02-06 RX ADMIN — ATORVASTATIN CALCIUM 40 MG: 40 TABLET, FILM COATED ORAL at 21:47

## 2021-02-06 RX ADMIN — LEVETIRACETAM 500 MG: 500 TABLET, FILM COATED ORAL at 10:55

## 2021-02-06 RX ADMIN — SODIUM CHLORIDE, PRESERVATIVE FREE 10 ML: 5 INJECTION INTRAVENOUS at 10:58

## 2021-02-06 RX ADMIN — METOPROLOL TARTRATE 25 MG: 25 TABLET ORAL at 10:56

## 2021-02-06 RX ADMIN — PANCRELIPASE LIPASE, PANCRELIPASE PROTEASE, PANCRELIPASE AMYLASE 40000 UNITS: 10000; 32000; 42000 CAPSULE, DELAYED RELEASE ORAL at 10:53

## 2021-02-06 RX ADMIN — CEPHALEXIN 500 MG: 500 CAPSULE ORAL at 21:46

## 2021-02-06 RX ADMIN — POLYETHYLENE GLYCOL 3350 17 G: 17 POWDER, FOR SOLUTION ORAL at 10:56

## 2021-02-06 RX ADMIN — Medication 4.5 MG: at 21:47

## 2021-02-06 RX ADMIN — ACETAMINOPHEN 650 MG: 325 TABLET ORAL at 14:47

## 2021-02-06 RX ADMIN — ACETAMINOPHEN 650 MG: 325 TABLET ORAL at 21:47

## 2021-02-06 RX ADMIN — HYDROMORPHONE HYDROCHLORIDE 0.5 MG: 1 INJECTION, SOLUTION INTRAMUSCULAR; INTRAVENOUS; SUBCUTANEOUS at 06:14

## 2021-02-06 RX ADMIN — CEPHALEXIN 500 MG: 500 CAPSULE ORAL at 10:55

## 2021-02-06 RX ADMIN — MIRTAZAPINE 15 MG: 15 TABLET, FILM COATED ORAL at 21:47

## 2021-02-06 RX ADMIN — LEVETIRACETAM 250 MG: 250 TABLET, FILM COATED ORAL at 21:46

## 2021-02-06 RX ADMIN — DOCUSATE SODIUM 100 MG: 100 CAPSULE, LIQUID FILLED ORAL at 10:55

## 2021-02-06 RX ADMIN — HYDROCODONE BITARTRATE AND ACETAMINOPHEN 1 TABLET: 5; 325 TABLET ORAL at 10:56

## 2021-02-06 RX ADMIN — HYDROMORPHONE HYDROCHLORIDE 0.25 MG: 1 INJECTION, SOLUTION INTRAMUSCULAR; INTRAVENOUS; SUBCUTANEOUS at 23:48

## 2021-02-06 RX ADMIN — Medication 1000 UNITS: at 10:55

## 2021-02-06 RX ADMIN — AMLODIPINE BESYLATE 2.5 MG: 2.5 TABLET ORAL at 10:55

## 2021-02-06 RX ADMIN — FAMOTIDINE 20 MG: 20 TABLET, FILM COATED ORAL at 21:47

## 2021-02-06 RX ADMIN — BUDESONIDE 6 MG: 3 CAPSULE, GELATIN COATED ORAL at 10:55

## 2021-02-06 RX ADMIN — QUETIAPINE FUMARATE 25 MG: 25 TABLET ORAL at 21:46

## 2021-02-06 ASSESSMENT — PAIN SCALES - WONG BAKER
WONGBAKER_NUMERICALRESPONSE: 6

## 2021-02-06 ASSESSMENT — PAIN SCALES - GENERAL
PAINLEVEL_OUTOF10: 5
PAINLEVEL_OUTOF10: 1
PAINLEVEL_OUTOF10: 6
PAINLEVEL_OUTOF10: 6
PAINLEVEL_OUTOF10: 7

## 2021-02-06 NOTE — PROGRESS NOTES
(GLYCOLAX) packet 17 g, 17 g, Oral, Daily PRN  bisacodyl (DULCOLAX) suppository 10 mg, 10 mg, Rectal, Daily PRN  cyclobenzaprine (FLEXERIL) tablet 5 mg, 5 mg, Oral, TID PRN  docusate sodium (COLACE) capsule 100 mg, 100 mg, Oral, BID  HYDROcodone-acetaminophen (NORCO) 5-325 MG per tablet 1 tablet, 1 tablet, Oral, Q6H PRN  HYDROcodone-acetaminophen (NORCO)  MG per tablet 1 tablet, 1 tablet, Oral, Q6H PRN  amLODIPine (NORVASC) tablet 2.5 mg, 2.5 mg, Oral, Daily  atorvastatin (LIPITOR) tablet 40 mg, 40 mg, Oral, Daily  budesonide (ENTOCORT EC) extended release capsule 6 mg, 6 mg, Oral, QAM  levETIRAcetam (KEPPRA) tablet 500 mg, 500 mg, Oral, Daily  metoprolol tartrate (LOPRESSOR) tablet 25 mg, 25 mg, Oral, BID  mirtazapine (REMERON) tablet 15 mg, 15 mg, Oral, Nightly  pantoprazole (PROTONIX) tablet 40 mg, 40 mg, Oral, Daily  QUEtiapine (SEROQUEL) tablet 25 mg, 25 mg, Oral, Nightly  levETIRAcetam (KEPPRA) tablet 250 mg, 250 mg, Oral, Nightly  magnesium hydroxide (MILK OF MAGNESIA) 400 MG/5ML suspension 30 mL, 30 mL, Oral, Daily PRN  polyethylene glycol (GLYCOLAX) packet 17 g, 17 g, Oral, Daily  meclizine (ANTIVERT) tablet 12.5 mg, 12.5 mg, Oral, TID PRN  melatonin tablet 4.5 mg, 4.5 mg, Oral, Nightly  Vitamin D (CHOLECALCIFEROL) tablet 1,000 Units, 1,000 Units, Oral, Daily  famotidine (PEPCID) tablet 20 mg, 20 mg, Oral, Nightly  lipase-protease-amylase (ZENPEP) delayed release capsule 40,000 Units, 40,000 Units, Oral, TID WC  [Held by provider] enoxaparin (LOVENOX) injection 30 mg, 30 mg, Subcutaneous, Daily        PLAN    Plan for discharge today [address].

## 2021-02-06 NOTE — PROGRESS NOTES
[address] [address]  INPATIENT PHYSICAL THERAPY  EVALUATION  [facility] TELEMETRY 6K - 6K-11/011-A    Time In: 6072  Time Out: 9037  Timed Code Treatment Minutes: 16 Minutes  Minutes: 28          Date: 2021  Patient Name: Mellisa Cadena,  Gender:  female        MRN: [id number]  : 1931  (80 y.o.)      Referring Practitioner: ERNA Stephens APRN-CNP  Diagnosis: Closed communited intertrochanteric fracture of proximal end of right femur  Additional Pertinent Hx: Per H&P: \"The patient is an 80y.o.-year-old with a history of a fall. Patient complained of Right hip pain. The patient has multiple medical comorbidities and was admitted with an intertrochanteric hip fracture. Patient was evaluated medically and felt patient to be of acceptable risk. It was felt a cephomedullary nail would be the best treatment option. Discussed with patient and elected to proceed. \" Patient s/p intramedullary nailing of right femur. Restrictions/Precautions:  Restrictions/Precautions: Fall Risk, Up as Tolerated  Position Activity Restriction  Other position/activity restrictions: WBAT on R LE    Subjective:  Chart Reviewed: Yes  Patient assessed for rehabilitation services?: Yes  Family / Caregiver Present: No  Subjective: RN approved evaluation. Pt in bed at start of session, crying upon entry. Pt stating she is in severe pain and feels something is not right with her hip. Pt R hip and foot internally rotated in bed, painful to move and touch. Pt labile throughout session. Pt in bed at end of session with all needs in reach. Bed alarm on. RN notified. General:  Overall Orientation Status: Impaired    Vision: Impaired    Hearing: Exceptions to [facility]  Hearing Exceptions: (Pt states she has hearing aides.)         Pain: 10/10: R hip.      Social/Functional History:    Lives With: Alone  Type of Home: House  Entrance Stairs - Number of Steps: 2  Home Equipment: Rolling walker          Receives Help From: Family(Pt states her son would go to the grocery for her)        Ambulation Assistance: Independent  Transfer Assistance: Independent    Active : No          OBJECTIVE:  Range of Motion:  Right Lower Extremity: Impaired - not tested due to high pain level. Left Lower Extremity: Not Tested    Strength:  Bilateral Lower Extremity: Not Tested    Balance:  Static Sitting Balance:  Contact Guard Assistance, Minimal Assistance  Pt sat EOB for ~6 minutes with Min A to CGA. Pt with high pain level. R hip and R foot internally rotated in sitting with pain with WB through R LE. Bed Mobility:  Rolling to Left: Dependent   Rolling to Right: Dependent   Supine to Sit: Maximum Assistance, X 2, with head of bed raised, with verbal cues , with increased time for completion  Sit to Supine: Maximum Assistance, X 2, with head of bed raised, with verbal cues , with increased time for completion   Scooting: Dependent    Transfers:  Not Tested    Ambulation:  Not Tested    Functional Outcome Measures: Completed  AM-PAC Inpatient Mobility without Stair Climbing Raw Score : 5  AM-PAC Inpatient without Stair Climbing T-Scale Score : 23.59     ASSESSMENT:  Activity Tolerance:  Patient tolerance of  treatment: poor. Pt limited due to 10/10 R hip pain. Treatment Initiated: Treatment and education initiated within context of evaluation. Evaluation time included review of current medical information, gathering information related to past medical, social and functional history, completion of standardized testing, formal and informal observation of tasks, assessment of data and development of plan of care and goals. Treatment time included skilled education and facilitation of tasks to increase safety and independence with functional mobility for improved independence and quality of life. Pt labile throughout session with increased pain. Sitting EOB pt R LE adducted and IR with hard end-feel.  When trying to move LE, pt with increased pain. RN notified for possible dislocation. RN ordered imaging. PT to assess further mobility next session. Assessment: Body structures, Functions, Activity limitations: Decreased functional mobility , Increased pain, Decreased balance, Decreased posture, Decreased strength, Decreased ROM, Decreased safe awareness, Decreased cognition  Assessment: Pt demonstrates decrease in baseline function with decreased ROM, strength and endurance and increased R hip pain limiting pt from independence with bed mobility and transfers. Pt would benefit from skilled PT intervention in order to improve functional I and safety with mobility. Prognosis: Good    REQUIRES PT FOLLOW UP: Yes    Discharge Recommendations:  Discharge Recommendations: Continue to assess pending progress, Subacute/Skilled Nursing Facility    Patient Education:  PT Education: PT Role, Transfer Training, Functional Mobility Training, General Safety, Plan of Care, Weight-bearing Education    Equipment Recommendations: Other: Continue to assess. Plan:  Times per week: 6xO  Current Treatment Recommendations: Strengthening, Neuromuscular Re-education, ROM, Safety Education & Training, Balance Training, Endurance Training, Patient/Caregiver Education & Training, Functional Mobility Training, Equipment Evaluation, Education, & procurement, Transfer Training, Gait Training, Stair training, Cognitive Reorientation    Goals:  Patient goals : To go home. Short term goals  Time Frame for Short term goals: by discharge  Short term goal 1: transfer supine<>sit with Min A in order to improve independence with bed mobility. Short term goal 2: roll L/R with Min A to improve independence with bed mobility. Short term goal 3: sit EOB for >15 minutes with SBA in order to improve sitting balance/tolerance. Short term goal 4: PT to assess sit<>stand transfer. Short term goal 5: PT to assess ambulation.   Long term goals  Time Frame for Long term goals : NA due to short ELOS. Following session, patient left in safe position with all fall risk precautions in place.

## 2021-02-06 NOTE — PROGRESS NOTES
Physician Discharge Summary     Patient ID:  Dylan Jack  [id number]  39 y.o.  6/21/1931    Admit date: 2/3/2021    Discharge date and time: No discharge date for patient encounter. Admitting Physician: Jeremías Costa MD     Discharge Physician: Jeremías Costa MD    Admission Diagnoses: Closed comminuted intertrochanteric fracture of proximal end of right femur, initial encounter [facility]) [S72.141A]    Discharge Diagnoses: Closed comminuted intertrochanteric fracture of proximal end of right femur, initial encounter [facility]) [place] Course: Patient was admitted on 2/4/2021. Secondary to right intertrochanteric femur fracture. Patient was cleared by internal medicine and patient understood the risk of undergoing any type of surgical intervention. Patient underwent intramedullary nailing of right femur.     Consults: Internal medicine    Condition: Stable    Treatments: Intramedullary nailing right femur    Disposition: [address] [place] [address]    Patient Instructions:    Jerad Butler   Home Medication Instructions XIV:[id number]    Printed on:02/06/21 0655   Medication Information                      Acetaminophen (APAP) 325 MG TABS  Take 650 mg by mouth every 6 hours as needed for Pain             Alum Hydroxide-Mag Carbonate (GAVISCON EXTRA STRENGTH PO)  Take 1-2 tablets by mouth 2 times daily as needed             amLODIPine (NORVASC) 2.5 MG tablet  Take 2.5 mg by mouth Daily with lunch              atorvastatin (LIPITOR) 40 MG tablet  Take 40 mg by mouth nightly              budesonide (ENTOCORT EC) 3 MG extended release capsule  Take 6 mg by mouth every morning             cyanocobalamin 1000 MCG/ML injection  Inject 1,000 mcg into the muscle every 30 days              doxycycline hyclate (VIBRA-TABS) 100 MG tablet  Take 100 mg by mouth 2 times daily X 10 days             enoxaparin (LOVENOX) 40 MG/0.4ML injection  Inject 0.4 mLs into the skin daily             famotidine (PEPCID) 40 MG tablet  Take 40 mg by mouth nightly             furosemide (LASIX) 20 MG tablet  Take 1 tablet by mouth daily as needed (leg swelling, shortness of breath, increase in daily weight > 3lbs.)             HYDROcodone-acetaminophen (NORCO) 5-325 MG per tablet  Take 1-2 tablets by mouth every 4-6 hours as needed for Pain for up to 7 days. KLOR-CON 10 10 MEQ extended release tablet  Take 20 mEq by mouth Daily with lunch              levETIRAcetam (KEPPRA) 500 MG tablet  Take 500 mg in am and 250 mg at night. Magic Mouthwash (MIRACLE MOUTHWASH)  Swish and spit 5 mLs 4 times daily as needed for Irritation [employer] Magic Mouthwash from [address]             meclizine (ANTIVERT) 25 MG tablet  Take 12.5 mg by mouth 3 times daily as needed              Melatonin 10 MG TABS  Take 5 mg by mouth nightly             metoprolol tartrate (LOPRESSOR) 25 MG tablet  Take 25 mg by mouth 2 times daily             mirtazapine (REMERON) 15 MG tablet  Take 15 mg by mouth nightly             Multiple Vitamin (MULTI VITAMIN PO)  Take 1 tablet by mouth daily             ondansetron (ZOFRAN) 8 MG tablet  Take 8 mg by mouth every 8 hours as needed for Nausea or Vomiting             Pancrelipase, Lip-Prot-Amyl, (ZENPEP) 05637-65031 units CPEP  Take 2 capsules by mouth 3 times daily (with meals)             pantoprazole (PROTONIX) 40 MG tablet  Take 40 mg by mouth every morning (before breakfast)             polyethylene glycol (GLYCOLAX) 17 g packet  Take 17 g by mouth daily as needed for Constipation             QUEtiapine (SEROQUEL) 25 MG tablet  Take 25 mg by mouth nightly              triamcinolone (KENALOG) 0.025 % ointment  Apply topically 2 times daily as needed Apply topically 2 times daily.               vitamin D (CHOLECALCIFEROL) 25 MCG (1000 UT) TABS tablet  Take 1,000 Units by mouth daily               Activity: Activity as tolerated weight-bear as tolerated  Diet: regular  Wound Care: Mesh dressing will come off in about 2 to 3 weeks why the patient follow-up with us in 6 to 8 weeks    Follow-up 6-8 weeks  Signed:  Kenia Mendoza PA-C  2/6/2021  6:55 AM

## 2021-02-07 PROCEDURE — 6370000000 HC RX 637 (ALT 250 FOR IP): Performed by: NURSE PRACTITIONER

## 2021-02-07 PROCEDURE — 6370000000 HC RX 637 (ALT 250 FOR IP): Performed by: PHARMACIST

## 2021-02-07 PROCEDURE — 2580000003 HC RX 258: Performed by: NURSE PRACTITIONER

## 2021-02-07 PROCEDURE — 97530 THERAPEUTIC ACTIVITIES: CPT

## 2021-02-07 PROCEDURE — 6360000002 HC RX W HCPCS: Performed by: NURSE PRACTITIONER

## 2021-02-07 PROCEDURE — 1200000000 HC SEMI PRIVATE

## 2021-02-07 PROCEDURE — 6370000000 HC RX 637 (ALT 250 FOR IP): Performed by: PHYSICIAN ASSISTANT

## 2021-02-07 PROCEDURE — 97166 OT EVAL MOD COMPLEX 45 MIN: CPT

## 2021-02-07 PROCEDURE — 94760 N-INVAS EAR/PLS OXIMETRY 1: CPT

## 2021-02-07 RX ADMIN — ACETAMINOPHEN 650 MG: 325 TABLET ORAL at 08:15

## 2021-02-07 RX ADMIN — METOPROLOL TARTRATE 25 MG: 25 TABLET ORAL at 20:56

## 2021-02-07 RX ADMIN — TRAMADOL HYDROCHLORIDE 50 MG: 50 TABLET ORAL at 16:06

## 2021-02-07 RX ADMIN — PANCRELIPASE LIPASE, PANCRELIPASE PROTEASE, PANCRELIPASE AMYLASE 40000 UNITS: 10000; 32000; 42000 CAPSULE, DELAYED RELEASE ORAL at 07:59

## 2021-02-07 RX ADMIN — CEPHALEXIN 500 MG: 500 CAPSULE ORAL at 08:15

## 2021-02-07 RX ADMIN — Medication 4.5 MG: at 20:57

## 2021-02-07 RX ADMIN — CEPHALEXIN 500 MG: 500 CAPSULE ORAL at 20:57

## 2021-02-07 RX ADMIN — LEVETIRACETAM 500 MG: 500 TABLET, FILM COATED ORAL at 08:15

## 2021-02-07 RX ADMIN — BUDESONIDE 6 MG: 3 CAPSULE, GELATIN COATED ORAL at 08:16

## 2021-02-07 RX ADMIN — METOPROLOL TARTRATE 25 MG: 25 TABLET ORAL at 08:15

## 2021-02-07 RX ADMIN — HYDROMORPHONE HYDROCHLORIDE 0.5 MG: 1 INJECTION, SOLUTION INTRAMUSCULAR; INTRAVENOUS; SUBCUTANEOUS at 03:29

## 2021-02-07 RX ADMIN — SODIUM CHLORIDE: 9 INJECTION, SOLUTION INTRAVENOUS at 02:16

## 2021-02-07 RX ADMIN — PANCRELIPASE LIPASE, PANCRELIPASE PROTEASE, PANCRELIPASE AMYLASE 40000 UNITS: 10000; 32000; 42000 CAPSULE, DELAYED RELEASE ORAL at 11:21

## 2021-02-07 RX ADMIN — CYCLOBENZAPRINE 5 MG: 10 TABLET, FILM COATED ORAL at 22:07

## 2021-02-07 RX ADMIN — ACETAMINOPHEN 650 MG: 325 TABLET ORAL at 03:21

## 2021-02-07 RX ADMIN — SODIUM CHLORIDE, PRESERVATIVE FREE 10 ML: 5 INJECTION INTRAVENOUS at 20:57

## 2021-02-07 RX ADMIN — LEVETIRACETAM 250 MG: 250 TABLET, FILM COATED ORAL at 20:57

## 2021-02-07 RX ADMIN — POLYETHYLENE GLYCOL 3350 17 G: 17 POWDER, FOR SOLUTION ORAL at 08:15

## 2021-02-07 RX ADMIN — DOCUSATE SODIUM 100 MG: 100 CAPSULE, LIQUID FILLED ORAL at 08:15

## 2021-02-07 RX ADMIN — DOCUSATE SODIUM 100 MG: 100 CAPSULE, LIQUID FILLED ORAL at 20:57

## 2021-02-07 RX ADMIN — SODIUM CHLORIDE, PRESERVATIVE FREE 10 ML: 5 INJECTION INTRAVENOUS at 08:16

## 2021-02-07 RX ADMIN — HYDROCODONE BITARTRATE AND ACETAMINOPHEN 1 TABLET: 5; 325 TABLET ORAL at 11:21

## 2021-02-07 RX ADMIN — PANTOPRAZOLE SODIUM 40 MG: 40 TABLET, DELAYED RELEASE ORAL at 08:15

## 2021-02-07 RX ADMIN — HYDROCODONE BITARTRATE AND ACETAMINOPHEN 1 TABLET: 10; 325 TABLET ORAL at 20:57

## 2021-02-07 RX ADMIN — QUETIAPINE FUMARATE 25 MG: 25 TABLET ORAL at 20:57

## 2021-02-07 RX ADMIN — MIRTAZAPINE 15 MG: 15 TABLET, FILM COATED ORAL at 20:57

## 2021-02-07 RX ADMIN — FAMOTIDINE 20 MG: 20 TABLET, FILM COATED ORAL at 20:57

## 2021-02-07 RX ADMIN — ATORVASTATIN CALCIUM 40 MG: 40 TABLET, FILM COATED ORAL at 21:13

## 2021-02-07 RX ADMIN — AMLODIPINE BESYLATE 2.5 MG: 2.5 TABLET ORAL at 11:21

## 2021-02-07 RX ADMIN — PANCRELIPASE LIPASE, PANCRELIPASE PROTEASE, PANCRELIPASE AMYLASE 40000 UNITS: 10000; 32000; 42000 CAPSULE, DELAYED RELEASE ORAL at 16:08

## 2021-02-07 RX ADMIN — Medication 1000 UNITS: at 08:14

## 2021-02-07 ASSESSMENT — PAIN SCALES - GENERAL
PAINLEVEL_OUTOF10: 6
PAINLEVEL_OUTOF10: 7
PAINLEVEL_OUTOF10: 6
PAINLEVEL_OUTOF10: 6
PAINLEVEL_OUTOF10: 7

## 2021-02-07 NOTE — PROGRESS NOTES
[address] 60  OCCUPATIONAL THERAPY MISSED TREATMENT NOTE  [facility] ORTHOPEDICS 7K  7K-17/017-A      Date: 2021  Patient Name: oGmez Plunkett        CSN: [id number]   : 1931  (80 y.o.)  Gender: female   Referring Practitioner: JENNIFER Cartwright  Diagnosis: Closed Comminuted Intertrochanteric Fx of Proximal End of R Femur         REASON FOR MISSED TREATMENT: Patient Refused; patient just was moved to [address] [place] [address] and reported it was painful when she stood and wanted to stay in bed to eat lunch. edu on purpose/benefits of OT with patient continuing to refuse. Will attempt again later as time permits.

## 2021-02-07 NOTE — PROGRESS NOTES
[address] [address] 60  INPATIENT OCCUPATIONAL THERAPY  [facility] ORTHOPEDICS 7K  EVALUATION    Time:   Time In: 2362  Time Out: 1332  Timed Code Treatment Minutes: 20 Minutes  Minutes: 30          Date: 2021  Patient Name: Biju Spain,   Gender: female      MRN: [id number]  : 1931  (80 y.o.)  Referring Practitioner: JENNIFER Sorto  Diagnosis: Closed Comminuted Intertrochanteric Fx of Proximal End of R Femur  Additional Pertinent Hx: Pt admitted after having fallen at home. She reported having forgotten to use her walker at the time. Pt underwent R Hip ORIFon 21. Restrictions/Precautions:  Restrictions/Precautions: Fall Risk, Up as Tolerated  Position Activity Restriction  Other position/activity restrictions: WBAT on R LE    Subjective  Chart Reviewed: Yes, Orders, Progress Notes, History and Physical  Patient assessed for rehabilitation services?: Yes  Family / Caregiver Present: No    Subjective: RN approved session; patient agreeable to evaluation. A & O x 3. Pain:       Social/Functional History:  Lives With: Alone  Type of Home: House  Home Layout: One level  Entrance Stairs - Number of Steps: 2  Home Equipment: Rolling walker, Cane(reports she did not use prior to fall and will start using now.)   Bathroom Shower/Tub: Walk-in shower  Bathroom Toilet: Standard    Receives Help From: Family  ADL Assistance: Independent  Ambulation Assistance: Independent  Transfer Assistance: Independent    Active : No  Patient's  Info: Pt states she mainly stayed at her home.           VISION:Corrected    HEARING:  WFL    COGNITION: WFL    RANGE OF MOTION:  Right Upper Extremity: Impaired - demosntrates approx 80 degrees R shldr flex AROM with pain and reports it is recommended it gets replaced; is able to self feed being R hand dominant  Left Upper Extremity:  WFL    STRENGTH:  Right Upper Extremity: WFL  Left Upper Extremity:  Impaired - 4/5 shldr, 4+/5 biceps and triceps    SENSATION:   WFL    ADL:   Grooming: Stand By Assistance. to complete oral care seated in recliner using mouthwash  Lower Extremity Dressing: Maximum Assistance. unable to don/doff slipper socks . BALANCE:  Sitting Balance:  Contact Guard Assistance. due to pain   Standing Balance: Minimal Assistance. flexed posture standing in lucía steady    BED MOBILITY:  Supine to Sit: Moderate Assistance due to pain    TRANSFERS:  Sit to Stand:  Maximum Assistance, X 2. with use of lucía steady    FUNCTIONAL MOBILITY:  Assistive Device: not completed; used lucía steady  Assist Level:  Not tested. Distance: needs to be evaluated       Exercise:  none completed this date    Activity Tolerance:  Patient tolerance of  treatment: fair. Greatly affected by pain; reporting 10/10 with movement and wt bearing. Assessment:  Assessment: Patient is greatly affected by pain and requires heavy assist for functional transfers. She is curently using a lucía steady with two assist and would benefit from continued therapy due to PLOF of living alone and was (I) with ADLs and functional mobility. Performance deficits / Impairments: Decreased functional mobility , Decreased endurance, Decreased ADL status  Prognosis: Good  REQUIRES OT FOLLOW UP: Yes  Decision Making: Medium Complexity    Treatment Initiated: Treatment and education initiated within context of evaluation. Evaluation time included review of current medical information, gathering information related to past medical, social and functional history, completion of standardized testing, formal and informal observation of tasks, assessment of data and development of plan of care and goals. Treatment time included skilled education and facilitation of tasks to increase safety and independence with ADL's for improved functional independence and quality of life.     Discharge Recommendations:  Continue to assess pending progress, Patient would benefit from continued

## 2021-02-07 NOTE — PROGRESS NOTES
HYDROmorphone (DILAUDID) injection 0.5 mg, 0.5 mg, Intravenous, Q3H PRN  ondansetron (ZOFRAN) injection 4 mg, 4 mg, Intravenous, Q6H PRN  polyethylene glycol (GLYCOLAX) packet 17 g, 17 g, Oral, Daily PRN  bisacodyl (DULCOLAX) suppository 10 mg, 10 mg, Rectal, Daily PRN  cyclobenzaprine (FLEXERIL) tablet 5 mg, 5 mg, Oral, TID PRN  docusate sodium (COLACE) capsule 100 mg, 100 mg, Oral, BID  HYDROcodone-acetaminophen (NORCO) 5-325 MG per tablet 1 tablet, 1 tablet, Oral, Q6H PRN  HYDROcodone-acetaminophen (NORCO)  MG per tablet 1 tablet, 1 tablet, Oral, Q6H PRN  amLODIPine (NORVASC) tablet 2.5 mg, 2.5 mg, Oral, Daily  atorvastatin (LIPITOR) tablet 40 mg, 40 mg, Oral, Daily  budesonide (ENTOCORT EC) extended release capsule 6 mg, 6 mg, Oral, QAM  levETIRAcetam (KEPPRA) tablet 500 mg, 500 mg, Oral, Daily  metoprolol tartrate (LOPRESSOR) tablet 25 mg, 25 mg, Oral, BID  mirtazapine (REMERON) tablet 15 mg, 15 mg, Oral, Nightly  pantoprazole (PROTONIX) tablet 40 mg, 40 mg, Oral, Daily  QUEtiapine (SEROQUEL) tablet 25 mg, 25 mg, Oral, Nightly  levETIRAcetam (KEPPRA) tablet 250 mg, 250 mg, Oral, Nightly  magnesium hydroxide (MILK OF MAGNESIA) 400 MG/5ML suspension 30 mL, 30 mL, Oral, Daily PRN  polyethylene glycol (GLYCOLAX) packet 17 g, 17 g, Oral, Daily  meclizine (ANTIVERT) tablet 12.5 mg, 12.5 mg, Oral, TID PRN  melatonin tablet 4.5 mg, 4.5 mg, Oral, Nightly  Vitamin D (CHOLECALCIFEROL) tablet 1,000 Units, 1,000 Units, Oral, Daily  famotidine (PEPCID) tablet 20 mg, 20 mg, Oral, Nightly  lipase-protease-amylase (ZENPEP) delayed release capsule 40,000 Units, 40,000 Units, Oral, TID WC  [Held by provider] enoxaparin (LOVENOX) injection 30 mg, 30 mg, Subcutaneous, Daily        PLAN    Did have discussion with patient and nurse at its most likely secondary to musculature pain within the groin causing the pain and causing the internal rotation of the hip x-rays were repeated yesterday which came back negative for any type of acute injury postoperatively and showed static alignment of intramedullary nailing. Plan is for discharge to [facility] tomorrow.

## 2021-02-07 NOTE — PROGRESS NOTES
[facility]  INPATIENT PHYSICAL THERAPY  DAILY NOTE  [facility] ORTHOPEDICS 7K - 7K-17/017-A    Time In: 0740  Time Out: 0804  Timed Code Treatment Minutes: 24 Minutes  Minutes: 24          Date: 2021  Patient Name: Key Lee,  Gender:  female        MRN: [id number]  : 1931  (80 y.o.)     Referring Practitioner: ERNA RODRIGUEZ  Diagnosis: Closed communited intertrochanteric fracture of proximal end of right femur  Additional Pertinent Hx: Per H&P: \"The patient is an 80y.o.-year-old with a history of a fall. Patient complained of Right hip pain. The patient has multiple medical comorbidities and was admitted with an intertrochanteric hip fracture. Patient was evaluated medically and felt patient to be of acceptable risk. It was felt a cephomedullary nail would be the best treatment option. Discussed with patient and elected to proceed. \" Patient s/p intramedullary nailing of right femur. Prior Level of Function:  Lives With: Alone  Type of Home: House  Entrance Stairs - Number of Steps: 2  Home Equipment: Rolling walker        Receives Help From: Family(Pt states her son would go to the grocery for her)  Ambulation Assistance: Independent  Transfer Assistance: Independent  Active : No    Restrictions/Precautions:  Restrictions/Precautions: Fall Risk, Up as Tolerated  Position Activity Restriction  Other position/activity restrictions: WBAT on R LE     SUBJECTIVE: RN approved PT evaluation. Pt remains in an adducted and internally rotated position of the right LE. Pt unable to achieve neutral position. Pt yelling out in pain with PROM. Pt reports pain is all in the proximal right hip. X-ray negative yesterday for dislocation. Pt labile throughout session. Increased time required for limited functional mobility.     PAIN: 10/10:     OBJECTIVE:  Bed Mobility:  Rolling to Left: Maximum Assistance, X 2   Supine to Sit: Maximum Assistance, X 2, with head of bed raised, with verbal cues , with increased time for completion    Transfers:  Sit to Stand: Maximum Assistance, X 2, with increased time for completion, cues for hand placement, to/from chair with arms, completed both times EOB with 2WW and lucía steady  Stand to Sit:Minimal Assistance, X 2, with increased time for completion, cues for hand placement, to/from chair with arms  [facility] [place] steady from EOB to chair: depAx2  Ambulation:  Not Tested  Attempted weight shifting, pt able to progress right LE toward left but is unable to bear weight through right LE to complete weight shifting  Pt remains adducted and internally rotated with any WB activity. Balance:  Static Standing Balance: Maximum Assistance, X 2  Dynamic Standing Balance: Maximum Assistance, X 2  Anterior weight shifting task completed. Pt sat EOB unsupported with CGA for ~5-6 minutes, no LOB noted. Functional Outcome Measures: Completed  AM-PAC Inpatient Mobility without Stair Climbing Raw Score : 5  AM-PAC Inpatient without Stair Climbing T-Scale Score : 23.59    ASSESSMENT:  Assessment: Patient progressing toward established goals. Activity Tolerance:  Patient tolerance of  treatment: fair- pt pain limited and labile for much of session. Equipment Recommendations: Other: Continue to assess. Discharge Recommendations:  Continue to assess pending progress, Subacute/Skilled Nursing Facility    Plan: Times per week: 6xO  Current Treatment Recommendations: Strengthening, Neuromuscular Re-education, ROM, Safety Education & Training, Balance Training, Endurance Training, Patient/Caregiver Education & Training, Functional Mobility Training, Equipment Evaluation, Education, & procurement, Transfer Training, Gait Training, Stair training, Cognitive Reorientation    Patient Education  Patient Education: Plan of Care, [employer] Mobility, Transfers, Gait    Goals:  Patient goals : To go home.   Short term goals  Time Frame for Short term goals: by discharge  Short term goal 1: transfer supine<>sit with Min A in order to improve independence with bed mobility. Short term goal 2: roll L/R with Min A to improve independence with bed mobility. Short term goal 3: sit EOB for >15 minutes with SBA in order to improve sitting balance/tolerance. Short term goal 4: sit <> stand with modAx1 for ease of transfers  Short term goal 5: PT to assess ambulation. Long term goals  Time Frame for Long term goals : NA due to short ELOS. Following session, patient left in safe position with all fall risk precautions in place.

## 2021-02-08 VITALS
SYSTOLIC BLOOD PRESSURE: 145 MMHG | HEIGHT: 59 IN | BODY MASS INDEX: 25.56 KG/M2 | RESPIRATION RATE: 16 BRPM | DIASTOLIC BLOOD PRESSURE: 79 MMHG | TEMPERATURE: 98.2 F | WEIGHT: 126.8 LBS | OXYGEN SATURATION: 99 % | HEART RATE: 73 BPM

## 2021-02-08 LAB
HCT VFR BLD CALC: 26.7 % (ref 37–47)
HEMOGLOBIN: 8.8 GM/DL (ref 12–16)
SARS-COV-2, NAAT: NOT DETECTED

## 2021-02-08 PROCEDURE — U0002 COVID-19 LAB TEST NON-CDC: HCPCS

## 2021-02-08 PROCEDURE — 97530 THERAPEUTIC ACTIVITIES: CPT

## 2021-02-08 PROCEDURE — 6370000000 HC RX 637 (ALT 250 FOR IP): Performed by: PHARMACIST

## 2021-02-08 PROCEDURE — 85014 HEMATOCRIT: CPT

## 2021-02-08 PROCEDURE — 36415 COLL VENOUS BLD VENIPUNCTURE: CPT

## 2021-02-08 PROCEDURE — 6370000000 HC RX 637 (ALT 250 FOR IP): Performed by: NURSE PRACTITIONER

## 2021-02-08 PROCEDURE — 6370000000 HC RX 637 (ALT 250 FOR IP): Performed by: PHYSICIAN ASSISTANT

## 2021-02-08 PROCEDURE — 6360000002 HC RX W HCPCS: Performed by: PHARMACIST

## 2021-02-08 PROCEDURE — 97110 THERAPEUTIC EXERCISES: CPT

## 2021-02-08 PROCEDURE — 85018 HEMOGLOBIN: CPT

## 2021-02-08 RX ADMIN — ACETAMINOPHEN 650 MG: 325 TABLET ORAL at 20:57

## 2021-02-08 RX ADMIN — TRAMADOL HYDROCHLORIDE 50 MG: 50 TABLET ORAL at 20:57

## 2021-02-08 RX ADMIN — HYDROCODONE BITARTRATE AND ACETAMINOPHEN 1 TABLET: 10; 325 TABLET ORAL at 17:16

## 2021-02-08 RX ADMIN — PANCRELIPASE LIPASE, PANCRELIPASE PROTEASE, PANCRELIPASE AMYLASE 40000 UNITS: 10000; 32000; 42000 CAPSULE, DELAYED RELEASE ORAL at 17:16

## 2021-02-08 RX ADMIN — CYCLOBENZAPRINE 5 MG: 10 TABLET, FILM COATED ORAL at 09:22

## 2021-02-08 RX ADMIN — POLYETHYLENE GLYCOL 3350 17 G: 17 POWDER, FOR SOLUTION ORAL at 09:22

## 2021-02-08 RX ADMIN — BUDESONIDE 6 MG: 3 CAPSULE, GELATIN COATED ORAL at 09:26

## 2021-02-08 RX ADMIN — ENOXAPARIN SODIUM 30 MG: 30 INJECTION SUBCUTANEOUS at 09:22

## 2021-02-08 RX ADMIN — PANTOPRAZOLE SODIUM 40 MG: 40 TABLET, DELAYED RELEASE ORAL at 09:22

## 2021-02-08 RX ADMIN — TRAMADOL HYDROCHLORIDE 50 MG: 50 TABLET ORAL at 13:22

## 2021-02-08 RX ADMIN — METOPROLOL TARTRATE 25 MG: 25 TABLET ORAL at 20:56

## 2021-02-08 RX ADMIN — PANCRELIPASE LIPASE, PANCRELIPASE PROTEASE, PANCRELIPASE AMYLASE 40000 UNITS: 10000; 32000; 42000 CAPSULE, DELAYED RELEASE ORAL at 13:24

## 2021-02-08 RX ADMIN — PANCRELIPASE LIPASE, PANCRELIPASE PROTEASE, PANCRELIPASE AMYLASE 40000 UNITS: 10000; 32000; 42000 CAPSULE, DELAYED RELEASE ORAL at 09:25

## 2021-02-08 RX ADMIN — METOPROLOL TARTRATE 25 MG: 25 TABLET ORAL at 09:25

## 2021-02-08 RX ADMIN — DOCUSATE SODIUM 100 MG: 100 CAPSULE, LIQUID FILLED ORAL at 09:22

## 2021-02-08 RX ADMIN — ATORVASTATIN CALCIUM 40 MG: 40 TABLET, FILM COATED ORAL at 20:57

## 2021-02-08 RX ADMIN — LEVETIRACETAM 500 MG: 500 TABLET, FILM COATED ORAL at 09:25

## 2021-02-08 RX ADMIN — FAMOTIDINE 20 MG: 20 TABLET, FILM COATED ORAL at 20:57

## 2021-02-08 RX ADMIN — HYDROCODONE BITARTRATE AND ACETAMINOPHEN 1 TABLET: 10; 325 TABLET ORAL at 09:22

## 2021-02-08 RX ADMIN — CEPHALEXIN 500 MG: 500 CAPSULE ORAL at 20:57

## 2021-02-08 RX ADMIN — QUETIAPINE FUMARATE 25 MG: 25 TABLET ORAL at 20:57

## 2021-02-08 RX ADMIN — MIRTAZAPINE 15 MG: 15 TABLET, FILM COATED ORAL at 20:57

## 2021-02-08 RX ADMIN — AMLODIPINE BESYLATE 2.5 MG: 2.5 TABLET ORAL at 13:23

## 2021-02-08 RX ADMIN — CYCLOBENZAPRINE 5 MG: 10 TABLET, FILM COATED ORAL at 20:57

## 2021-02-08 RX ADMIN — DOCUSATE SODIUM 100 MG: 100 CAPSULE, LIQUID FILLED ORAL at 20:57

## 2021-02-08 RX ADMIN — CEPHALEXIN 500 MG: 500 CAPSULE ORAL at 09:26

## 2021-02-08 RX ADMIN — Medication 1000 UNITS: at 09:25

## 2021-02-08 RX ADMIN — LEVETIRACETAM 250 MG: 250 TABLET, FILM COATED ORAL at 20:57

## 2021-02-08 ASSESSMENT — PAIN SCALES - WONG BAKER: WONGBAKER_NUMERICALRESPONSE: 6

## 2021-02-08 ASSESSMENT — PAIN SCALES - GENERAL
PAINLEVEL_OUTOF10: 9
PAINLEVEL_OUTOF10: 7
PAINLEVEL_OUTOF10: 6

## 2021-02-08 ASSESSMENT — PAIN DESCRIPTION - ONSET: ONSET: ON-GOING

## 2021-02-08 ASSESSMENT — PAIN DESCRIPTION - PROGRESSION: CLINICAL_PROGRESSION: NOT CHANGED

## 2021-02-08 ASSESSMENT — PAIN DESCRIPTION - ORIENTATION: ORIENTATION: RIGHT

## 2021-02-08 ASSESSMENT — PAIN DESCRIPTION - FREQUENCY: FREQUENCY: CONTINUOUS

## 2021-02-08 ASSESSMENT — PAIN DESCRIPTION - DESCRIPTORS: DESCRIPTORS: ACHING

## 2021-02-08 NOTE — PROGRESS NOTES
Pt admitted to  7K17 via bed. Complaints: S/P right femur surgery. IV normal saline infusing into the forearm right, condition patent and no redness at a rate of 20 mls/ hour with about 200 mls in the bag still. IV site free of s/s of infection or infiltration. Vital signs obtained. Assessment and data collection initiated. Two nurse skin assessment performed by Tiara FOX and [employer]. Oriented to room. Explained patients right to have family, representative or physician notified of their admission. Patient has Declined for physician to be notified. Patient has Declined for family/representative to be notified. The patient is interested in [facility]. [facility]s meds to beds program?:  No    Policies and procedures for  explained. All questions answered with no further questions at this time. Fall prevention and safety brochure discussed with patient. Bed alarm on. Call light in reach.

## 2021-02-08 NOTE — CARE COORDINATION
CMS BPCI Advanced Beneficiary Notification letter given to patient.   Electronically signed by Radha Rudolph RN on 2/8/2021 at 11:34 AM

## 2021-02-08 NOTE — PROGRESS NOTES
[address] [id number]  INPATIENT PHYSICAL THERAPY  DAILY NOTE  [facility] ORTHOPEDICS 7K - [id number]    Time In: 1005  Time Out: 1028  Timed Code Treatment Minutes: 23 Minutes  Minutes: 23          Date: 2021  Patient Name: Dylan Jack,  Gender:  female        MRN: [id number]  : 1931  (80 y.o.)     Referring Practitioner: ERNA Martinez APRN-CNP  Diagnosis: Closed communited intertrochanteric fracture of proximal end of right femur  Additional Pertinent Hx: Per H&P: \"The patient is an 80y.o.-year-old with a history of a fall. Patient complained of Right hip pain. The patient has multiple medical comorbidities and was admitted with an intertrochanteric hip fracture. Patient was evaluated medically and felt patient to be of acceptable risk. It was felt a cephomedullary nail would be the best treatment option. Discussed with patient and elected to proceed. \" Patient s/p intramedullary nailing of right femur. Prior Level of Function:  Lives With: Alone  Type of Home: House  Home Layout: One level  Entrance Stairs - Number of Steps: 2  Home Equipment: Rolling walker, Cane(reports she did not use prior to fall and will start using now.)   Bathroom Shower/Tub: Walk-in shower  Bathroom Toilet: Standard    Receives Help From: Family  ADL Assistance: Independent  Ambulation Assistance: Independent  Transfer Assistance: Independent  Active : No    Restrictions/Precautions:  Restrictions/Precautions: Fall Risk, Up as Tolerated  Position Activity Restriction  Other position/activity restrictions: WBAT on R LE     SUBJECTIVE: RN approved session. Pt resting in bed upon arrival, pleasant and agreeable to therapy. PAIN: \"hurts a little, but feels better today\"    OBJECTIVE:  Bed Mobility:  Rolling to Right: Minimal Assistance, X 2   Supine to Sit: Minimal Assistance, X 2    Transfers:  Sit to Stand: Contact Guard Assistance, Minimal Assistance, X 2, Min x2 from EOB into TERRI steady.  Min x1 and CGA x1 from seat of TERRI steady. Stand to Sit:Minimal Assistance, X 2  Bed to Chair: Dependent, With use of TERRI steady. Ambulation:  Not Tested    Balance:  Static Sitting Balance:  Stand By Assistance  Static Standing Balance: Contact Guard Assistance, Minimal Assistance, Pt completed partial weight shifts to R LE(very little weight bearing through R LE) to progress towards transfers and gait. Exercise:  Patient was guided in 1 set(s) 10 reps of exercise to both lower extremities. Ankle pumps, Glut sets, Quad sets, Heelslides, Hip abduction/adduction, Seated marches and Long arc quads. Exercises were completed for increased independence with functional mobility. Functional Outcome Measures: Completed  AM-PAC Inpatient Mobility without Stair Climbing Raw Score : 8  AM-PAC Inpatient without Stair Climbing T-Scale Score : 30.65    ASSESSMENT:  Assessment: Patient progressing toward established goals. and Pt tolerated session fairly well. Limited by pain, decreased strength, decreased mobility. Pt will require SNF for conitnued therapy at discharge. Activity Tolerance:  Patient tolerance of  treatment: good. Equipment Recommendations: Other: Continue to assess. Discharge Recommendations:  Continue to assess pending progress, Subacute/Skilled Nursing Facility    Plan: Times per week: 6xO  Current Treatment Recommendations: Strengthening, Neuromuscular Re-education, ROM, Safety Education & Training, Balance Training, Endurance Training, Patient/Caregiver Education & Training, Functional Mobility Training, Equipment Evaluation, Education, & procurement, Transfer Training, Gait Training, Stair training, Cognitive Reorientation    Patient Education  Patient Education: Plan of Care, [employer] Mobility, Transfers    Goals:  Patient goals : To go home.   Short term goals  Time Frame for Short term goals: by discharge  Short term goal 1: transfer supine<>sit with Min A in order to improve independence with bed

## 2021-02-08 NOTE — FLOWSHEET NOTE
02/08/21 1000   Encounter Summary   Services provided to: Patient   Referral/Consult From: [facility] Family members   Continue Visiting   (2/8)   Complexity of Encounter Low   Routine   Type Initial   Assessment Calm;Passive; Approachable   Intervention Prayer; Active listening   Outcome Acceptance

## 2021-02-08 NOTE — CARE COORDINATION
2/8/21, 2:21 PM EST    [address] [facility] [address] can accept today, [address] faxed. [facility] transport scheduled for 1930. Confirmed with [facility] staff. Son is here with patient, pt and son in agreement. 2/8/21, 2:22 PM EST    Patient goals/plan/ treatment preferences discussed by  and . Patient goals/plan/ treatment preferences reviewed with patient/ family. Patient/ family verbalize understanding of discharge plan and are in agreement with goal/plan/treatment preferences. Understanding was demonstrated using the teach back method. AVS provided by RN at time of discharge, which includes all necessary medical information pertaining to the patients current course of illness, treatment, post-discharge goals of care, and treatment preferences.     Services After Discharge  Services At/After Discharge: [facility], Nursing Services, Skilled Therapy([facility])   IMM Letter  IMM Letter given to Patient/Family/Significant other/Guardian/POA/by[de-identified]   IMM Letter date given[de-identified] 02/08/21  IMM Letter time given[de-identified] 2053

## 2021-02-08 NOTE — CARE COORDINATION
2/8/21, 7:21 AM EST    DISCHARGE PLANNING EVALUATION    Pt transferred off of unit to , report provided to unit Clora Mcburney.

## 2021-02-08 NOTE — PROGRESS NOTES
Orthopaedic Progress Note      SUBJECTIVE:    Chief Complaint   Patient presents with    Fall   POD 4 s/p right IT with IMN  Seen in bed, complains of right groin pain. Physical    Vitals:    02/08/21 0445   BP: (!) 160/76   Pulse: 60   Resp: 16   Temp: 98.2 °F (36.8 °C)   SpO2: 95%         OBJECTIVE dressing c/d/i. Painful ROM. Flex and extends toes and ankle. Dressing distal femur c/di. Compartments soft.  SILT     Data  CBC:   Lab Results   Component Value Date    WBC 6.9 02/06/2021    RBC 2.69 02/06/2021    HGB 7.9 02/06/2021    HCT 25.2 02/06/2021    MCV 93.7 02/06/2021    MCH 29.4 02/06/2021    MCHC 31.3 02/06/2021    RDW 15.4 12/22/2016     02/06/2021    MPV 8.7 02/06/2021     BMP:    Lab Results   Component Value Date     02/06/2021    K 3.6 02/06/2021    K 4.3 02/04/2021     02/06/2021    CO2 18 02/06/2021    BUN 24 02/06/2021    LABALBU 3.7 02/03/2021    CREATININE 1.1 02/06/2021    CALCIUM 7.5 02/06/2021    LABGLOM 47 02/06/2021    GLUCOSE 86 02/06/2021     Uric Acid:  No components found for: URIC  PT/INR:    Lab Results   Component Value Date    INR 0.96 02/04/2021     PTT:  No results found for: APTT, PTT[APTT  Troponin:  No results found for: TROPONINI  Urine Culture:  No components found for: CURINE    Current Inpatient Medications    Current Facility-Administered Medications: 0.9 % sodium chloride infusion, , Intravenous, PRN  cephALEXin (KEFLEX) capsule 500 mg, 500 mg, Oral, 2 times per day  0.9 % sodium chloride infusion, , Intravenous, Continuous  sodium chloride flush 0.9 % injection 10 mL, 10 mL, Intravenous, 2 times per day  sodium chloride flush 0.9 % injection 10 mL, 10 mL, Intravenous, PRN  acetaminophen (TYLENOL) tablet 650 mg, 650 mg, Oral, Q6H  traMADol (ULTRAM) tablet 50 mg, 50 mg, Oral, Q6H PRN  HYDROmorphone (DILAUDID) injection 0.25 mg, 0.25 mg, Intravenous, Q3H PRN **OR** HYDROmorphone (DILAUDID) injection 0.5 mg, 0.5 mg, Intravenous, Q3H PRN  ondansetron

## 2021-02-09 NOTE — PROGRESS NOTES
Report called to [facility] floor 2 RN. Questions answered. Pt to be transported per [facility] at 2130. Pt and facility are OK with this time. Pt son at bedside awaiting transport.

## 2021-02-11 NOTE — DISCHARGE SUMMARY
Patient ID:  David Del Angel  [id number]  34 y.o.  6/21/1931    Admit date: 2/3/2021    Discharge date and time: 2/8/2021 10:37 PM     Admitting Physician: Murray Richardson MD     Discharge Physician: Murray Richardson MD    Admission Diagnoses: Closed comminuted intertrochanteric fracture of proximal end of right femur, initial encounter [facility]) [S72.141A]    Discharge Diagnoses: Closed comminuted intertrochanteric fracture of proximal end of right femur, initial encounter [facility]) [address] Course: Boby Myrick is an 80year old female with a history of a fall sustaining a right intertroch fracture. She underwent stabilization without complication. Post-op she worked with PT, her pain was well controlled. She developed acute blood loss anemia and treated with one unit of PRBC. Her VS and H/H stabilized and she is ready for DC    Consults:  IM    Treatments: ORIF right hip    Disposition: fair/stable    Patient Instructions:      Medication List      START taking these medications    enoxaparin 40 MG/0.4ML injection  Commonly known as: Lovenox  Inject 0.4 mLs into the skin daily     HYDROcodone-acetaminophen 5-325 MG per tablet  Commonly known as: Norco  Take 1-2 tablets by mouth every 4-6 hours as needed for Pain for up to 7 days. CHANGE how you take these medications    Melatonin 10 MG Tabs  What changed: Another medication with the same name was removed. Continue taking this medication, and follow the directions you see here. vitamin D 25 MCG (1000 UT) Tabs tablet  Commonly known as: CHOLECALCIFEROL  What changed: Another medication with the same name was removed. Continue taking this medication, and follow the directions you see here. Zenpep 93251-90877 units Cpep  Generic drug: Pancrelipase (Lip-Prot-Amyl)  What changed: Another medication with the same name was removed. Continue taking this medication, and follow the directions you see here.         CONTINUE taking these medications    amLODIPine 2.5 MG tablet  Commonly known as: NORVASC     APAP 325 MG Tabs     atorvastatin 40 MG tablet  Commonly known as: LIPITOR     budesonide 3 MG extended release capsule  Commonly known as: ENTOCORT EC     cyanocobalamin 1000 MCG/ML injection     doxycycline hyclate 100 MG tablet  Commonly known as: VIBRA-TABS     famotidine 40 MG tablet  Commonly known as: PEPCID     furosemide 20 MG tablet  Commonly known as: LASIX  Take 1 tablet by mouth daily as needed (leg swelling, shortness of breath, increase in daily weight > 3lbs.)     GAVISCON EXTRA STRENGTH PO     Klor-Con 10 10 MEQ extended release tablet  Generic drug: potassium chloride     levETIRAcetam 500 MG tablet  Commonly known as: KEPPRA  Take 500 mg in am and 250 mg at night.      Magic Mouthwash  Commonly known as: Miracle Mouthwash     meclizine 25 MG tablet  Commonly known as: ANTIVERT     metoprolol tartrate 25 MG tablet  Commonly known as: LOPRESSOR     mirtazapine 15 MG tablet  Commonly known as: REMERON     MULTI VITAMIN PO     ondansetron 8 MG tablet  Commonly known as: ZOFRAN     pantoprazole 40 MG tablet  Commonly known as: PROTONIX     polyethylene glycol 17 g packet  Commonly known as: GLYCOLAX     QUEtiapine 25 MG tablet  Commonly known as: SEROQUEL     triamcinolone 0.025 % ointment  Commonly known as: KENALOG           Where to Get Your Medications      You can get these medications from any pharmacy    Bring a paper prescription for each of these medications  · enoxaparin 40 MG/0.4ML injection  · HYDROcodone-acetaminophen 5-325 MG per tablet       Activity: wbat  Diet: regular  Wound Care: dry dressings    Follow-up Dr Kennedy Valentin    Signed:  Suzi Borja  2/11/2021  7:08 AM

## 2021-09-13 ENCOUNTER — OFFICE VISIT (OUTPATIENT)
Dept: NEUROLOGY | Age: 86
End: 2021-09-13
Payer: MEDICARE

## 2021-09-13 VITALS
DIASTOLIC BLOOD PRESSURE: 82 MMHG | SYSTOLIC BLOOD PRESSURE: 134 MMHG | HEIGHT: 56 IN | WEIGHT: 117 LBS | RESPIRATION RATE: 17 BRPM | BODY MASS INDEX: 26.32 KG/M2

## 2021-09-13 DIAGNOSIS — G40.909 SEIZURE DISORDER (HCC): Primary | ICD-10-CM

## 2021-09-13 PROCEDURE — 1123F ACP DISCUSS/DSCN MKR DOCD: CPT | Performed by: NURSE PRACTITIONER

## 2021-09-13 PROCEDURE — 1090F PRES/ABSN URINE INCON ASSESS: CPT | Performed by: NURSE PRACTITIONER

## 2021-09-13 PROCEDURE — 99213 OFFICE O/P EST LOW 20 MIN: CPT | Performed by: NURSE PRACTITIONER

## 2021-09-13 PROCEDURE — G8427 DOCREV CUR MEDS BY ELIG CLIN: HCPCS | Performed by: NURSE PRACTITIONER

## 2021-09-13 PROCEDURE — G8417 CALC BMI ABV UP PARAM F/U: HCPCS | Performed by: NURSE PRACTITIONER

## 2021-09-13 PROCEDURE — 1036F TOBACCO NON-USER: CPT | Performed by: NURSE PRACTITIONER

## 2021-09-13 PROCEDURE — 4040F PNEUMOC VAC/ADMIN/RCVD: CPT | Performed by: NURSE PRACTITIONER

## 2021-09-13 NOTE — PROGRESS NOTES
NEUROLOGY OUT PATIENT FOLLOW UP NOTE:  9/13/202111:09 AM    Nova Vance is here for follow up for seizure. ROS:  Respiratory : no cough, no shortness of breath  Cardiac: no chest pain. No palpitations.   Renal : no flank pain, no hematuria    Skin: no rash      Allergies   Allergen Reactions    Amoxil [Amoxicillin] Other (See Comments)     SORES IN MOUTH    Clindamycin/Lincomycin Other (See Comments)     SORES IN MOUTH    Lyrica [Pregabalin] Other (See Comments)     SORES IN MOUTH    Procardia [Nifedipine] Other (See Comments)     SORES IN MOUTH    Tenormin [Atenolol] Other (See Comments)     SORES IN MOUTH       Current Outpatient Medications:     Acetaminophen (APAP) 325 MG TABS, Take 650 mg by mouth every 6 hours as needed for Pain, Disp: , Rfl:     enoxaparin (LOVENOX) 40 MG/0.4ML injection, Inject 0.4 mLs into the skin daily, Disp: 18 Syringe, Rfl: 0    Melatonin 10 MG TABS, Take 5 mg by mouth nightly, Disp: , Rfl:     pantoprazole (PROTONIX) 40 MG tablet, Take 40 mg by mouth every morning (before breakfast), Disp: , Rfl:     vitamin D (CHOLECALCIFEROL) 25 MCG (1000 UT) TABS tablet, Take 1,000 Units by mouth daily, Disp: , Rfl:     Pancrelipase, Lip-Prot-Amyl, (ZENPEP) 58084-74667 units CPEP, Take 2 capsules by mouth 3 times daily (with meals), Disp: , Rfl:     famotidine (PEPCID) 40 MG tablet, Take 40 mg by mouth nightly, Disp: , Rfl:     polyethylene glycol (GLYCOLAX) 17 g packet, Take 17 g by mouth daily as needed for Constipation, Disp: , Rfl:     Alum Hydroxide-Mag Carbonate (GAVISCON EXTRA STRENGTH PO), Take 1-2 tablets by mouth 2 times daily as needed, Disp: , Rfl:     levETIRAcetam (KEPPRA) 500 MG tablet, Take 500 mg in am and 250 mg at night., Disp: 135 tablet, Rfl: 3    furosemide (LASIX) 20 MG tablet, Take 1 tablet by mouth daily as needed (leg swelling, shortness of breath, increase in daily weight > 3lbs.), Disp: 60 tablet, Rfl: 0    budesonide (ENTOCORT EC) 3 MG extended release capsule, Take 6 mg by mouth every morning, Disp: , Rfl:     ondansetron (ZOFRAN) 8 MG tablet, Take 8 mg by mouth every 8 hours as needed for Nausea or Vomiting, Disp: , Rfl:     Magic Mouthwash (MIRACLE MOUTHWASH), Swish and spit 5 mLs 4 times daily as needed for Irritation [employer] Magic Mouthwash from [facility], Disp: , Rfl:     meclizine (ANTIVERT) 25 MG tablet, Take 12.5 mg by mouth 3 times daily as needed , Disp: , Rfl:     triamcinolone (KENALOG) 0.025 % ointment, Apply topically 2 times daily as needed Apply topically 2 times daily. , Disp: , Rfl:     KLOR-CON 10 10 MEQ extended release tablet, Take 20 mEq by mouth Daily with lunch , Disp: , Rfl:     amLODIPine (NORVASC) 2.5 MG tablet, Take 2.5 mg by mouth Daily with lunch , Disp: , Rfl:     Multiple Vitamin (MULTI VITAMIN PO), Take 1 tablet by mouth daily, Disp: , Rfl:     atorvastatin (LIPITOR) 40 MG tablet, Take 40 mg by mouth nightly , Disp: , Rfl:     metoprolol tartrate (LOPRESSOR) 25 MG tablet, Take 25 mg by mouth 2 times daily, Disp: , Rfl:     mirtazapine (REMERON) 15 MG tablet, Take 15 mg by mouth nightly, Disp: , Rfl:     QUEtiapine (SEROQUEL) 25 MG tablet, Take 25 mg by mouth nightly , Disp: , Rfl:     cyanocobalamin 1000 MCG/ML injection, Inject 1,000 mcg into the muscle every 30 days , Disp: , Rfl:     I reviewed the past medical history, allergies, medications, social history and family history. PE:   Vitals:    09/13/21 1101   BP: 134/82   Site: Left Upper Arm   Position: Sitting   Cuff Size: Medium Adult   Resp: 17   Weight: 117 lb (53.1 kg)   Height: 4' 8\" (1.422 m)     General Appearance:  awake, alert, oriented, in no acute distress  Gen: NAD, Language is Intact. Skin: no rash, lesion, dry to touch. warm  Head: no rash, no icterus  Neck: There is no carotid bruits. The Neck is supple. There is no neck lymphadenopathy. Neuro: CN 2-12 grossly intact with no focal deficits.  Power 5/5 Throughout symmetric, Reflexes are  symmetric. Long tracts are intact. Cerebellar exam is Intact. Sensory exam is intact to light touch. Gait is intact, she is using walker. Musculoskeletal:  Has +ve bilateral hand arthritis, no limitation of ROM in any of the four extremities. Lower extremities no edema          DATA:    Results for orders placed during the hospital encounter of 02/03/21    CT HEAD WO CONTRAST    Narrative  CT Head WO Contrast    COMPARISON:  MR  - MRI BRAIN B STEM W/WO CONTR  - 10/18/2010 01:45 PM EDT    FINDINGS:  No acute intracranial hemorrhage. No evidence of acute infarction. Diffuse  cortical volume loss. Nonspecific white matter hypodensities most commonly associated with  chronic microangiopathic changes. No mass-effect or midline shift. No hydrocephalus. Visualized orbits are normal.  Small fluid in the paranasal sinuses. Fluid in the left mastoid air cells. No acute fracture. Unremarkable soft tissues. Impression  No acute intracranial findings. Left mastoiditis. Nonemergent/incidental findings in the report. This document has been electronically signed by: Brayan King MD on  02/03/2021 11:21 PM    All CT scans at this facility use dose modulation, iterative  reconstruction, and/or weight-based  dosing when appropriate to reduce radiation dose to as low as reasonably  achievable. Assessment:     Diagnosis Orders   1. Seizure disorder [facility])          She is doing well. She denies any seizure or spells. She is on Keppra and is tolerating the medication well. She is now in AL as she fell in 2/2021 and fractured her right hip. She is monitored with her medication intake. She did recently completed physical therapy and is walking with a walker. After detailed discussion with patient and her son we agreed on the following plan. Plan:  1. Continue with Keppra to 500 mg in am and 250 mg at night. 2. Follow through with physical therapy recommendations   3. Seizure precautions. 4. Report any new seizure/spells  5. Follow up in 1 year or sooner if needed.      Total time 24 min    JEFFERY Jane - PRATIBHA

## 2021-09-13 NOTE — PATIENT INSTRUCTIONS
1. Continue with Keppra to 500 mg in am and 250 mg at night. 2. Follow through with physical therapy recommendations   3. Seizure precautions. 4. Report any new seizure/spells  5. Follow up in 1 year or sooner if needed.

## 2022-07-16 ENCOUNTER — HOSPITAL ENCOUNTER (EMERGENCY)
Age: 87
Discharge: HOME | End: 2022-07-16
Attending: EMERGENCY MEDICINE
Payer: MEDICARE

## 2022-07-16 VITALS
HEART RATE: 69 BPM | BODY MASS INDEX: 29.15 KG/M2 | SYSTOLIC BLOOD PRESSURE: 138 MMHG | RESPIRATION RATE: 16 BRPM | WEIGHT: 130 LBS | TEMPERATURE: 98 F | OXYGEN SATURATION: 100 % | DIASTOLIC BLOOD PRESSURE: 67 MMHG

## 2022-07-16 DIAGNOSIS — K64.4 EXTERNAL HEMORRHOID, BLEEDING: Primary | ICD-10-CM

## 2022-07-16 PROCEDURE — 99283 EMERGENCY DEPT VISIT LOW MDM: CPT

## 2022-07-16 RX ORDER — HYDROCORTISONE ACETATE 25 MG/1
25 SUPPOSITORY RECTAL 2 TIMES DAILY
Qty: 20 SUPPOSITORY | Refills: 0 | Status: SHIPPED | OUTPATIENT
Start: 2022-07-16 | End: 2022-07-26

## 2022-07-16 ASSESSMENT — ENCOUNTER SYMPTOMS
NAUSEA: 0
RECTAL PAIN: 1
ABDOMINAL PAIN: 0
COUGH: 0
WHEEZING: 0
DIARRHEA: 0
VOMITING: 0
ABDOMINAL DISTENTION: 0
SHORTNESS OF BREATH: 0
ANAL BLEEDING: 1
SINUS PRESSURE: 0
CHEST TIGHTNESS: 0
BLOOD IN STOOL: 1
BACK PAIN: 0
CONSTIPATION: 0
STRIDOR: 0
SINUS PAIN: 0

## 2022-07-16 NOTE — ED TRIAGE NOTES
Pt to ED due to painful hemorrhoids. Pt states that she is able to have bowel movements, \"they are just painful. \" No distress noted. Respirations even and unlabored. VSS. yes yes/Pt. left before being seen

## 2022-07-16 NOTE — ED NOTES
Bladder scan shows 328ml. Pt assisted to the restroom at this time. Bladder scan shows 73 ml after urination. Pt is concerned for hemorrhoids. This RN notes a mass-like protrusion from the rectum that the patient states is painful.       Carly Carter, RN  07/16/22 1150       Carly Carter, RN  07/16/22 Amy Sina 10, RN  07/16/22 1156

## 2022-07-16 NOTE — ED PROVIDER NOTES
[facility] ENCOUNTER          Pt Name: Tavares Ibanez  MRN: [id number]  [place] 6/21/1931  Date of evaluation: 7/16/2022  Treating Resident Physician: Hortencia Lim MD  Supervising Physician: Franchesca Scott    History obtained from child and the patient. CHIEF COMPLAINT       Chief Complaint   Patient presents with    Hemorrhoids           HISTORY OF PRESENT ILLNESS    HPI  Tavares Ibanez is a 80 y.o. female w/ PMH of Raynauds, HTN, HLD, GERD, FM, Chronic pancreatitis, Seizures. who presents to the emergency department for evaluation of BRBPR and 3/10 anal Pain, onset 3 weeks prior s/p diarrhea , Pt attributes pain 2/2 hemorrhoids. For which she has tired gel for however no relief per pt. BRBPR stated to be significant less than 1 cup. No systemic problems per ROS below. Pt declines surgical referral at this time. The patient has no other acute complaints at this time. REVIEW OF SYSTEMS   Review of Systems   Constitutional:  Negative for activity change, appetite change, chills, diaphoresis, fatigue, fever and unexpected weight change. HENT:  Negative for congestion, sinus pressure and sinus pain. Respiratory:  Negative for cough, chest tightness, shortness of breath, wheezing and stridor. Gastrointestinal:  Positive for anal bleeding, blood in stool and rectal pain. Negative for abdominal distention, abdominal pain, constipation, diarrhea, nausea and vomiting. Genitourinary:  Negative for decreased urine volume, difficulty urinating, dysuria, frequency, hematuria, pelvic pain and urgency. Musculoskeletal:  Negative for arthralgias, back pain, gait problem, joint swelling, myalgias and neck pain. Skin:  Negative for pallor, rash and wound. Neurological:  Negative for tremors, seizures, syncope, weakness and light-headedness. Hematological:  Negative for adenopathy.    Psychiatric/Behavioral:  Negative for agitation, behavioral problems, confusion, decreased concentration and dysphoric mood. The patient is not nervous/anxious. PAST MEDICAL AND SURGICAL HISTORY     Past Medical History:   Diagnosis Date    Chronic pancreatitis (HCC)     Fibromyalgia     GERD (gastroesophageal reflux disease)     Hyperlipidemia     Hypertension     Low potassium syndrome     Osteoarthritis     Raynaud disease     Seizures ([facility] [address]      Past Surgical History:   Procedure Laterality Date    APPENDECTOMY      CHOLECYSTECTOMY      COLONOSCOPY      EYE SURGERY      HERNIA REPAIR      HIP SURGERY Right 2/4/2021    RIGHT INTERTAN performed by Michi Dorsey MD at [address] [place] [address] ([facility])      UPPER GASTROINTESTINAL ENDOSCOPY           MEDICATIONS   No current facility-administered medications for this encounter. Current Outpatient Medications:     hydrocortisone (ANUSOL-HC) 25 MG suppository, Place 1 suppository rectally in the morning and 1 suppository before bedtime. Do all this for 10 days. , Disp: 20 suppository, Rfl: 0    enoxaparin (LOVENOX) 40 MG/0.4ML injection, Inject 0.4 mLs into the skin daily (Patient not taking: Reported on 9/13/2021), Disp: 18 Syringe, Rfl: 0    Melatonin 10 MG TABS, Take 5 mg by mouth nightly, Disp: , Rfl:     pantoprazole (PROTONIX) 40 MG tablet, Take 40 mg by mouth every morning (before breakfast), Disp: , Rfl:     vitamin D (CHOLECALCIFEROL) 25 MCG (1000 UT) TABS tablet, Take 1,000 Units by mouth daily, Disp: , Rfl:     Pancrelipase, Lip-Prot-Amyl, (ZENPEP) 98573-47795 units CPEP, Take 2 capsules by mouth 3 times daily (with meals), Disp: , Rfl:     famotidine (PEPCID) 40 MG tablet, Take 40 mg by mouth nightly, Disp: , Rfl:     polyethylene glycol (GLYCOLAX) 17 g packet, Take 17 g by mouth daily as needed for Constipation (Patient not taking: Reported on 9/13/2021), Disp: , Rfl:     Alum Hydroxide-Mag Carbonate (GAVISCON EXTRA STRENGTH PO), Take 1-2 tablets by mouth 2 times daily as needed (Patient not taking: Reported on 9/13/2021), Disp: , Rfl:     Acetaminophen (APAP) 325 MG TABS, Take 650 mg by mouth every 6 hours as needed for Pain, Disp: , Rfl:     levETIRAcetam (KEPPRA) 500 MG tablet, Take 500 mg in am and 250 mg at night., Disp: 135 tablet, Rfl: 3    furosemide (LASIX) 20 MG tablet, Take 1 tablet by mouth daily as needed (leg swelling, shortness of breath, increase in daily weight > 3lbs.), Disp: 60 tablet, Rfl: 0    budesonide (ENTOCORT EC) 3 MG extended release capsule, Take 6 mg by mouth every morning, Disp: , Rfl:     ondansetron (ZOFRAN) 8 MG tablet, Take 8 mg by mouth every 8 hours as needed for Nausea or Vomiting (Patient not taking: Reported on 9/13/2021), Disp: , Rfl:     Magic Mouthwash (MIRACLE MOUTHWASH), Swish and spit 5 mLs 4 times daily as needed for Irritation [employer] Magic Mouthwash from [address] (Patient not taking: Reported on 9/13/2021), Disp: , Rfl:     meclizine (ANTIVERT) 25 MG tablet, Take 12.5 mg by mouth 3 times daily as needed  (Patient not taking: Reported on 9/13/2021), Disp: , Rfl:     triamcinolone (KENALOG) 0.025 % ointment, Apply topically 2 times daily as needed Apply topically 2 times daily.   (Patient not taking: Reported on 9/13/2021), Disp: , Rfl:     KLOR-CON 10 10 MEQ extended release tablet, Take 20 mEq by mouth Daily with lunch , Disp: , Rfl:     amLODIPine (NORVASC) 2.5 MG tablet, Take 2.5 mg by mouth Daily with lunch , Disp: , Rfl:     Multiple Vitamin (MULTI VITAMIN PO), Take 1 tablet by mouth daily, Disp: , Rfl:     atorvastatin (LIPITOR) 40 MG tablet, Take 40 mg by mouth nightly , Disp: , Rfl:     metoprolol tartrate (LOPRESSOR) 25 MG tablet, Take 25 mg by mouth 2 times daily, Disp: , Rfl:     mirtazapine (REMERON) 15 MG tablet, Take 15 mg by mouth nightly, Disp: , Rfl:     QUEtiapine (SEROQUEL) 25 MG tablet, Take 25 mg by mouth nightly , Disp: , Rfl:     cyanocobalamin 1000 MCG/ML injection, Inject 1,000 mcg into the muscle every 30 days (Patient not taking: Reported on 9/13/2021), Disp: , Rfl:       SOCIAL HISTORY     Social History     Social History Narrative    Not on file     Social History     Tobacco Use    Smoking status: Never    Smokeless tobacco: Never   Vaping Use    Vaping Use: Never used   Substance Use Topics    Alcohol use: No    Drug use: No         ALLERGIES     Allergies   Allergen Reactions    Amoxil [Amoxicillin] Other (See Comments)     SORES IN MOUTH    Clindamycin/Lincomycin Other (See Comments)     SORES IN MOUTH    Lyrica [Pregabalin] Other (See Comments)     SORES IN MOUTH    Procardia [Nifedipine] Other (See Comments)     SORES IN MOUTH    Tenormin [Atenolol] Other (See Comments)     SORES IN MOUTH         FAMILY HISTORY     Family History   Problem Relation Age of Onset    Cancer Mother     High Blood Pressure Mother     Arthritis Father     Hearing Loss Father     Stroke Father     High Blood Pressure Maternal Aunt     Heart Disease Maternal Grandmother     High Blood Pressure Maternal Grandmother          PREVIOUS RECORDS   Previous records reviewed:  No hx of hemmhroids diagnosed. Bert Mccurdy PHYSICAL EXAM     ED Triage Vitals   BP Temp Temp src Pulse Resp SpO2 Height Weight   -- -- -- -- -- -- -- --     Initial vital signs and nursing assessment reviewed and abnormal from Bp 172 sbp, which improved on recheck to 138/67 . Body mass index is 29.15 kg/m². Pulsoximetry is normal per my interpretation. Additional Vital Signs:  Vitals:    07/16/22 1221   BP: 138/67   Pulse: 69   Resp: 16   Temp:    SpO2: 100%       Physical Exam  Exam conducted with a chaperone present. Constitutional:       General: She is not in acute distress. Appearance: Normal appearance. She is obese. She is not ill-appearing or diaphoretic. HENT:      Head: Normocephalic and atraumatic. Eyes:      General: No scleral icterus. Right eye: No discharge. Left eye: No discharge.       Extraocular Movements: Extraocular movements intact. Conjunctiva/sclera: Conjunctivae normal.      Pupils: Pupils are equal, round, and reactive to light. Cardiovascular:      Rate and Rhythm: Normal rate. Pulses: Normal pulses. Heart sounds: Normal heart sounds. No murmur heard. No friction rub. No gallop. Pulmonary:      Effort: Pulmonary effort is normal. No respiratory distress. Breath sounds: Normal breath sounds. No stridor. No rhonchi. Chest:      Chest wall: No tenderness. Abdominal:      General: Abdomen is flat. Bowel sounds are normal. There is no distension. Palpations: Abdomen is soft. There is no mass. Tenderness: There is no abdominal tenderness. There is no guarding or rebound. Hernia: No hernia is present. Genitourinary:     General: Normal vulva. Urethra: Prolapse (mild 1-2mm) present. No urethral pain or urethral lesion. Rectum: External hemorrhoid (multiple) present. No mass, tenderness, anal fissure or internal hemorrhoid. Normal anal tone. Comments: Normal anoscopy, no friable walls, no masses seen. Musculoskeletal:         General: Signs of injury (noted injury LLE cut, wrapped and bandaged) present. No swelling or tenderness. Normal range of motion. Skin:     General: Skin is warm and dry. Capillary Refill: Capillary refill takes less than 2 seconds. Coloration: Skin is not jaundiced or pale. Findings: No erythema or rash. Neurological:      General: No focal deficit present. Mental Status: She is alert and oriented to person, place, and time. Cranial Nerves: No cranial nerve deficit. Motor: No weakness. Gait: Gait abnormal (uses walker). MEDICAL DECISION MAKING   Initial Assessment:   Pt is a 81 y/o female w/ PMH of Raynauds, HTN, HLD, GERD, FM, Chronic pancreatitis, Seizures. who presents to the emergency department for evaluation of BRBPR and 3/10 anal Pain x 3 weeks.  Scant blood per pt, no major bleeding, noted hypertensive on arrival. Physical exam grossly benign  Ddx External hemorrhoids, internal Hemorrhoid, rectal prolapse, diverticulitis/diverticulosis. Rectocele. Plan:   Grossly Benign exam,  Recheck BP. Anusol suppository and Sitz bath at discharge, pt currently declines Sx.        ED RESULTS   Laboratory results:  Labs Reviewed - No data to display    Radiologic studies results:  No orders to display       ED Medications administered this visit: Medications - No data to display      ED COURSE     ED Course as of 07/16/22 1347   Sat Jul 16, 2022   1208 Physical exam grossly unremarkable, noted external hemorrhoids on exam, negative anoscopy. [CC]      ED Course User Index  [CC] Hill Bucio MD         Strict return precautions and follow up instructions were discussed with the patient prior to discharge, with which the patient agrees. MEDICATION CHANGES     Discharge Medication List as of 7/16/2022 12:36 PM        START taking these medications    Details   hydrocortisone (ANUSOL-HC) 25 MG suppository Place 1 suppository rectally in the morning and 1 suppository before bedtime. Do all this for 10 days. , Disp-20 suppository, R-0Normal               FINAL DISPOSITION     Final diagnoses:   External hemorrhoid, bleeding     Condition: condition: good  Dispo: Discharge to nursing home    Likely scant bleeding external hemorrhoids, anoscopy negative, lesion not friable. Sitz bath and anusol at Discharge. Pt declines Sx at this time. This transcription was electronically signed. Parts of this transcriptions may have been dictated by use of voice recognition software and electronically transcribed, and parts may have been transcribed with the assistance of an ED scribe. The transcription may contain errors not detected in proofreading. Please refer to my supervising physician's documentation if my documentation differs.     Electronically Signed: Hill Bucio MD, 07/16/22, 1:47 PM Rachana Wilkerson MD  Resident  07/16/22 8401

## 2022-07-16 NOTE — DISCHARGE INSTRUCTIONS
Conduct Sitz Bath three times a day or after stooling.     Return if pain increases or if bleeding worsens (greater than 1 shot glass of blood)

## 2022-08-11 ENCOUNTER — HOSPITAL ENCOUNTER (EMERGENCY)
Age: 87
Discharge: HOME | End: 2022-08-11
Attending: STUDENT IN AN ORGANIZED HEALTH CARE EDUCATION/TRAINING PROGRAM
Payer: MEDICARE

## 2022-08-11 VITALS
SYSTOLIC BLOOD PRESSURE: 136 MMHG | TEMPERATURE: 98.3 F | OXYGEN SATURATION: 96 % | HEART RATE: 63 BPM | RESPIRATION RATE: 16 BRPM | DIASTOLIC BLOOD PRESSURE: 72 MMHG

## 2022-08-11 DIAGNOSIS — K62.3 RECTAL PROLAPSE: Primary | ICD-10-CM

## 2022-08-11 LAB
BACTERIA: ABNORMAL /HPF
BILIRUBIN URINE: NEGATIVE
BLOOD, URINE: NEGATIVE
CASTS 2: ABNORMAL /LPF
CASTS UA: ABNORMAL /LPF
CHARACTER, URINE: CLEAR
COLOR: YELLOW
CRYSTALS, UA: ABNORMAL
EPITHELIAL CELLS, UA: ABNORMAL /HPF
GLUCOSE URINE: NEGATIVE MG/DL
HEMOCCULT STL QL: NEGATIVE
KETONES, URINE: NEGATIVE
LEUKOCYTE ESTERASE, URINE: ABNORMAL
MISCELLANEOUS 2: ABNORMAL
NITRITE, URINE: NEGATIVE
PH UA: 6 (ref 5–9)
PROTEIN UA: NEGATIVE
RBC URINE: ABNORMAL /HPF
RENAL EPITHELIAL, UA: ABNORMAL
SPECIFIC GRAVITY, URINE: 1.01 (ref 1–1.03)
UROBILINOGEN, URINE: 0.2 EU/DL (ref 0–1)
WBC UA: ABNORMAL /HPF
YEAST: ABNORMAL

## 2022-08-11 PROCEDURE — 82272 OCCULT BLD FECES 1-3 TESTS: CPT

## 2022-08-11 PROCEDURE — 81001 URINALYSIS AUTO W/SCOPE: CPT

## 2022-08-11 PROCEDURE — 99283 EMERGENCY DEPT VISIT LOW MDM: CPT

## 2022-08-11 PROCEDURE — 6370000000 HC RX 637 (ALT 250 FOR IP): Performed by: STUDENT IN AN ORGANIZED HEALTH CARE EDUCATION/TRAINING PROGRAM

## 2022-08-11 RX ORDER — HYDROCORTISONE ACETATE 25 MG/1
25 SUPPOSITORY RECTAL ONCE
Status: COMPLETED | OUTPATIENT
Start: 2022-08-11 | End: 2022-08-11

## 2022-08-11 RX ADMIN — HYDROCORTISONE ACETATE 25 MG: 25 SUPPOSITORY RECTAL at 12:54

## 2022-08-11 ASSESSMENT — ENCOUNTER SYMPTOMS
VOMITING: 0
COLOR CHANGE: 0
ABDOMINAL DISTENTION: 0
CONSTIPATION: 0
SINUS PAIN: 0
SHORTNESS OF BREATH: 0
RECTAL PAIN: 1
RHINORRHEA: 0
DIARRHEA: 1
EYE DISCHARGE: 0
NAUSEA: 0
BLOOD IN STOOL: 0
EYE ITCHING: 0
ABDOMINAL PAIN: 0
CHEST TIGHTNESS: 0
EYE REDNESS: 0
SINUS PRESSURE: 0

## 2022-08-11 NOTE — ED PROVIDER NOTES
[place] ENCOUNTER          Pt Name: Colleen Lou  MRN: [id number]  [place] 6/21/1931  Date of evaluation: 8/11/2022  Treating Resident Physician: Alexandria Ochoa DO  Supervising Physician: Dr. Kaylynn Ruiz    History obtained from the patient and the patient's family. CHIEF COMPLAINT       Chief Complaint   Patient presents with    Other     Rectal prolapse- states she hasnt been able to reduce it for about a week           HISTORY OF PRESENT ILLNESS    HPI  Colleen Lou is a 80 y.o. female who presents to the emergency department for evaluation of suspected rectal prolapse. The patient was brought to the emergency department by her family. They report that she lives at assisted living and has had a rectal prolapse for up a months. She states that she was seen by her OB/GYN for this and was referred to [facility] and was seen there yesterday for cardiac clearance to have surgery for a rectal prolapse. She states that it usually reduces on its own but today there was worsening pain and concern that it was still prolapsed so they came to the emergency department. She reports associated rectal pain. On review of systems the patient reports chronic diarrhea, dysuria, and is not sure if she could also have a pelvic organ prolapse. She has a history of a hysterectomy. The patient has no other acute complaints at this time. REVIEW OF SYSTEMS   Review of Systems   Constitutional:  Negative for fever. HENT:  Negative for rhinorrhea, sinus pressure and sinus pain. Eyes:  Negative for discharge, redness and itching. Respiratory:  Negative for chest tightness and shortness of breath. Cardiovascular:  Negative for chest pain. Gastrointestinal:  Positive for diarrhea and rectal pain. Negative for abdominal distention, abdominal pain, blood in stool, constipation, nausea and vomiting.    Genitourinary:  Negative for difficulty urinating, dysuria, frequency, hematuria and urgency. Musculoskeletal:  Negative for arthralgias. Skin:  Negative for color change and pallor. Neurological:  Negative for dizziness, light-headedness and headaches. PAST MEDICAL AND SURGICAL HISTORY     Past Medical History:   Diagnosis Date    Chronic pancreatitis (HCC)     Fibromyalgia     GERD (gastroesophageal reflux disease)     Hyperlipidemia     Hypertension     Low potassium syndrome     Osteoarthritis     Raynaud disease     Seizures ([address]      Past Surgical History:   Procedure Laterality Date    APPENDECTOMY      CHOLECYSTECTOMY      COLONOSCOPY      EYE SURGERY      HERNIA REPAIR      HIP SURGERY Right 2/4/2021    RIGHT INTERTAN performed by Rosalee Mcintosh MD at [address] [facility] ([address] [place] [address])      UPPER GASTROINTESTINAL ENDOSCOPY           MEDICATIONS   No current facility-administered medications for this encounter.     Current Outpatient Medications:     enoxaparin (LOVENOX) 40 MG/0.4ML injection, Inject 0.4 mLs into the skin daily (Patient not taking: Reported on 9/13/2021), Disp: 18 Syringe, Rfl: 0    Melatonin 10 MG TABS, Take 5 mg by mouth nightly, Disp: , Rfl:     pantoprazole (PROTONIX) 40 MG tablet, Take 40 mg by mouth every morning (before breakfast), Disp: , Rfl:     vitamin D (CHOLECALCIFEROL) 25 MCG (1000 UT) TABS tablet, Take 1,000 Units by mouth daily, Disp: , Rfl:     Pancrelipase, Lip-Prot-Amyl, (ZENPEP) 29963-05774 units CPEP, Take 2 capsules by mouth 3 times daily (with meals), Disp: , Rfl:     famotidine (PEPCID) 40 MG tablet, Take 40 mg by mouth nightly, Disp: , Rfl:     polyethylene glycol (GLYCOLAX) 17 g packet, Take 17 g by mouth daily as needed for Constipation (Patient not taking: Reported on 9/13/2021), Disp: , Rfl:     Alum Hydroxide-Mag Carbonate (GAVISCON EXTRA STRENGTH PO), Take 1-2 tablets by mouth 2 times daily as needed (Patient not taking: Reported on 9/13/2021), Disp: , Rfl:     Acetaminophen (APAP) 325 MG TABS, Take 650 mg by mouth every 6 hours as needed for Pain, Disp: , Rfl:     levETIRAcetam (KEPPRA) 500 MG tablet, Take 500 mg in am and 250 mg at night., Disp: 135 tablet, Rfl: 3    furosemide (LASIX) 20 MG tablet, Take 1 tablet by mouth daily as needed (leg swelling, shortness of breath, increase in daily weight > 3lbs.), Disp: 60 tablet, Rfl: 0    budesonide (ENTOCORT EC) 3 MG extended release capsule, Take 6 mg by mouth every morning, Disp: , Rfl:     ondansetron (ZOFRAN) 8 MG tablet, Take 8 mg by mouth every 8 hours as needed for Nausea or Vomiting (Patient not taking: Reported on 9/13/2021), Disp: , Rfl:     Magic Mouthwash (MIRACLE MOUTHWASH), Swish and spit 5 mLs 4 times daily as needed for Irritation Gun[employer]'s Magic Mouthwash from [address] (Patient not taking: Reported on 9/13/2021), Disp: , Rfl:     meclizine (ANTIVERT) 25 MG tablet, Take 12.5 mg by mouth 3 times daily as needed  (Patient not taking: Reported on 9/13/2021), Disp: , Rfl:     triamcinolone (KENALOG) 0.025 % ointment, Apply topically 2 times daily as needed Apply topically 2 times daily.   (Patient not taking: Reported on 9/13/2021), Disp: , Rfl:     KLOR-CON 10 10 MEQ extended release tablet, Take 20 mEq by mouth Daily with lunch , Disp: , Rfl:     amLODIPine (NORVASC) 2.5 MG tablet, Take 2.5 mg by mouth Daily with lunch , Disp: , Rfl:     Multiple Vitamin (MULTI VITAMIN PO), Take 1 tablet by mouth daily, Disp: , Rfl:     atorvastatin (LIPITOR) 40 MG tablet, Take 40 mg by mouth nightly , Disp: , Rfl:     metoprolol tartrate (LOPRESSOR) 25 MG tablet, Take 25 mg by mouth 2 times daily, Disp: , Rfl:     mirtazapine (REMERON) 15 MG tablet, Take 15 mg by mouth nightly, Disp: , Rfl:     QUEtiapine (SEROQUEL) 25 MG tablet, Take 25 mg by mouth nightly , Disp: , Rfl:     cyanocobalamin 1000 MCG/ML injection, Inject 1,000 mcg into the muscle every 30 days  (Patient not taking: Reported on 9/13/2021), Disp: , Rfl:       SOCIAL HISTORY     Social History     Social History Narrative    Not on file     Social History     Tobacco Use    Smoking status: Never    Smokeless tobacco: Never   Vaping Use    Vaping Use: Never used   Substance Use Topics    Alcohol use: No    Drug use: No         ALLERGIES     Allergies   Allergen Reactions    Amoxil [Amoxicillin] Other (See Comments)     SORES IN MOUTH    Clindamycin/Lincomycin Other (See Comments)     SORES IN MOUTH    Lyrica [Pregabalin] Other (See Comments)     SORES IN MOUTH    Procardia [Nifedipine] Other (See Comments)     SORES IN MOUTH    Tenormin [Atenolol] Other (See Comments)     SORES IN MOUTH         FAMILY HISTORY     Family History   Problem Relation Age of Onset    Cancer Mother     High Blood Pressure Mother     Arthritis Father     Hearing Loss Father     Stroke Father     High Blood Pressure Maternal Aunt     Heart Disease Maternal Grandmother     High Blood Pressure Maternal Grandmother          PREVIOUS RECORDS   Previous records reviewed        PHYSICAL EXAM     ED Triage Vitals [08/11/22 0931]   BP Temp Temp Source Heart Rate Resp SpO2 Height Weight   (!) 158/84 98.3 °F (36.8 °C) Oral 70 17 98 % -- --     Initial vital signs and nursing assessment reviewed and abnormal from HTN . There is no height or weight on file to calculate BMI. Pulsoximetry is normal per my interpretation. Additional Vital Signs:  Vitals:    08/11/22 1206   BP: 136/72   Pulse: 63   Resp: 16   Temp:    SpO2: 96%       Physical Exam  Vitals and nursing note reviewed. Constitutional:       General: She is in acute distress. Appearance: Normal appearance. She is ill-appearing. Comments: The patient appears chronically ill and in mild distress due to pain. HENT:      Head: Normocephalic and atraumatic. Nose: Nose normal. No congestion or rhinorrhea. Mouth/Throat:      Mouth: Mucous membranes are moist.      Pharynx: No oropharyngeal exudate or posterior oropharyngeal erythema. Comments: Black discoloration to the tongue, consistent with possible hairy tongue. Eyes:      Extraocular Movements: Extraocular movements intact. Pupils: Pupils are equal, round, and reactive to light. Cardiovascular:      Rate and Rhythm: Normal rate and regular rhythm. Pulses: Normal pulses. Heart sounds: Normal heart sounds. Pulmonary:      Effort: Pulmonary effort is normal.      Breath sounds: Normal breath sounds. Abdominal:      General: Abdomen is flat. There is no distension. Palpations: Abdomen is soft. Tenderness: There is no abdominal tenderness. Genitourinary:     General: Normal vulva. Comments: External  exam normal and not remarkable for a pelvic organ prolapse. No rectal prolapse on external anal exam.  On digital rectal exam there is rectal vault tenderness and a palpable hemorrhoid in the left rectal vault which appears to partially prolapse consistent with grade 3 hemorrhoids. Musculoskeletal:         General: No swelling or tenderness. Normal range of motion. Cervical back: Normal range of motion and neck supple. Right lower leg: No edema. Left lower leg: No edema. Skin:     General: Skin is warm and dry. Neurological:      General: No focal deficit present. Mental Status: She is alert and oriented to person, place, and time. Mental status is at baseline. MEDICAL DECISION MAKING   Initial Assessment:   80-year-old female presenting the emergency department for evaluation of rectal pain. Grade 3 hemorrhoids. Plan:   UA to assess for UTI. Fecal blood occult stool screen. Anusol suppository and prescription at discharge. The patient's family requested a general surgery referral here, which was provided at discharge.         ED RESULTS   Laboratory results:  Labs Reviewed   URINE WITH REFLEXED MICRO - Abnormal; Notable for the following components:       Result Value    Leukocyte Esterase, Urine TRACE (*)     All other components within normal limits   BLOOD OCCULT STOOL SCREEN #1       Radiologic studies results:  No orders to display       ED Medications administered this visit:   Medications   hydrocortisone (ANUSOL-HC) suppository 25 mg (25 mg Rectal Given 8/11/22 1254)         ED COURSE     ED Course as of 08/11/22 1901   Thu Aug 11, 2022   1125 The patient had a rectal prolapse in the emergency department while sitting on the toilet to void. Rectal prolapse reduced by myself my supervising physician at bedside with sugar and gentle pressure to the prolapse. [DO]   3103 Given that the patient is having worsening symptoms and will likely continue to have a rectal prolapse while at home, this case was discussed with Dr. Wendi Perry. Given that the patient would need cardiac clearance and the rectal prolapse repair is not emergent, we will plan to discharge the patient back to assisted living. [DO]      ED Course User Index  [DO] Alesia Grove DO     On discharge, we will plan to have the patient use granulated sugar in the event of a prolapse and use a diaper and avoid sitting down on the toilet to go to the bathroom to help prevent a prolapse. We will plan to start 1 Anusol suppository here and avoid a prescription for Anusol to avoid mucosal thinning and the risk of developing complications after surgery next Tuesday. We will plan to have the patient follow-up with her primary care provider and with her surgeon. Strict return precautions and follow up instructions were discussed with the patient prior to discharge, with which the patient agrees. The patient is instructed to:    Do not hesitate to return to the emergency department if you are experiencing any new or worsening symptoms such as worsening rectal pain, fever, bleeding, abnormal rectal discharge, if your rectal prolapse is unable to be reduced, or if you have any other concerns.     If your rectal prolapse occurs, apply granulated sugar to your rectal prolapse to help it reduce. To reduce the risk of a rectal prolapse occurring, use a diaper to go to the bathroom and avoid sitting on the toilet. Follow-up by calling your general surgeon's office as soon as possible and with your primary care provider. MEDICATION CHANGES     Discharge Medication List as of 8/11/2022 12:32 PM            FINAL DISPOSITION     Final diagnoses:   Rectal prolapse     Condition: condition: fair  Dispo: Discharge to assisted living      This transcription was electronically signed. Parts of this transcriptions may have been dictated by use of voice recognition software and electronically transcribed, and parts may have been transcribed with the assistance of an ED scribe. The transcription may contain errors not detected in proofreading. Please refer to my supervising physician's documentation if my documentation differs.     Electronically Signed: Tosha Powell DO, 08/11/22, 7:01 PM         Tosha Powell DO  Resident  08/11/22 8572

## 2022-08-11 NOTE — ED NOTES
When pt got up to use bathroom she called out and stated that her rectum had prolapsed again. Provider made aware and placed pt prone on cart- sugar applied to rectum per Dr Randall Pate.       Huey Wiggins RN  08/11/22 0960

## 2022-08-11 NOTE — ED TRIAGE NOTES
Pt to ED w/rprts of rectal prolapse, on going for the past few weeks. Seeing Dr. Brandi Curiel in [facility]. Instructed to f/u in ER.

## 2022-08-11 NOTE — ED NOTES
Pt able to roll onto left side per self for rectal exam. No rectal prolapse noted at this time per Dr Shayne Otero.  Pt still states rectal pain w/exam.      aWng Fitzgerald RN  08/11/22 4041

## 2022-08-11 NOTE — ED PROVIDER NOTES
[facility] [address] [address] EMERGENCY DEPT  ED Attending Physician Attestation     I performed a history and physical examination of the patient and discussed management with the Resident. I reviewed the Resident's note and agree with the documented findings and plan of care. Any areas of disagreement are noted on the chart. I was personally present for the key portions of any procedures and have documented in the chart those procedures where I was not present during the key portions. I have reviewed the emergency nurses triage note and agree with the chief complaint, past medical history, past surgical history, allergies, medications, social and family history as documented unless otherwise noted below. 77-year-old female presents with chief complaint of rectal prolapse. She has a surgery appointment on Tuesday. On initial examination the patient had multiple external nonthrombosed hemorrhoids without evidence of rectal prolapse. While the patient was in the emergency department she used the bathroom following which she had an of rectal prolapse. Sugar was sprinkled on the prolapsed rectum and it began to auto reduce. The rectum completely reduced with minimal gentle pressure and the patient's symptoms completely resolved. We discussed the case with general surgeon on-call who reports no indication or emergent surgery for this condition and recommended patient follow-up with her surgery appointment on Tuesday. Return precautions discussed with the patient as well as her family. Surgery and PCP follow-up. I was present throughout the entire rectal prolapse reduction.     Olga Wang MD, [facility]  Attending Emergency Physician       Olga Wang MD  08/11/22 1700

## 2022-08-11 NOTE — DISCHARGE INSTRUCTIONS
Do not hesitate to return to the emergency department if you are experiencing any new or worsening symptoms such as worsening rectal pain, fever, bleeding, abnormal rectal discharge, if your rectal prolapse is unable to be reduced, or if you have any other concerns. If your rectal prolapse occurs, apply granulated sugar to your rectal prolapse to help it reduce. To reduce the risk of a rectal prolapse occurring, use a diaper to go to the bathroom and avoid sitting on the toilet. Follow-up by calling your general surgeon's office as soon as possible and with your primary care provider.

## 2023-06-09 ENCOUNTER — APPOINTMENT (OUTPATIENT)
Dept: GENERAL RADIOLOGY | Age: 88
DRG: 483 | End: 2023-06-09
Payer: MEDICARE

## 2023-06-09 ENCOUNTER — APPOINTMENT (OUTPATIENT)
Dept: CT IMAGING | Age: 88
DRG: 483 | End: 2023-06-09
Payer: MEDICARE

## 2023-06-09 ENCOUNTER — HOSPITAL ENCOUNTER (INPATIENT)
Age: 88
LOS: 7 days | Discharge: NURSING, REHAB OR LONG-TERM CARE | DRG: 483 | End: 2023-06-16
Attending: EMERGENCY MEDICINE | Admitting: ORTHOPAEDIC SURGERY
Payer: MEDICARE

## 2023-06-09 DIAGNOSIS — I50.31 ACUTE HEART FAILURE WITH PRESERVED EJECTION FRACTION (HCC): ICD-10-CM

## 2023-06-09 DIAGNOSIS — S42.291A CLOSED FRACTURE OF ANATOMICAL NECK OF RIGHT HUMERUS, INITIAL ENCOUNTER: ICD-10-CM

## 2023-06-09 DIAGNOSIS — S42.292A CLOSED FRACTURE OF ANATOMICAL NECK OF LEFT HUMERUS, INITIAL ENCOUNTER: Primary | ICD-10-CM

## 2023-06-09 LAB
ALBUMIN SERPL BCG-MCNC: 4.2 G/DL (ref 3.5–5.1)
ALP SERPL-CCNC: 177 U/L (ref 38–126)
ALT SERPL W/O P-5'-P-CCNC: 29 U/L (ref 11–66)
ANION GAP SERPL CALC-SCNC: 15 MEQ/L (ref 8–16)
AST SERPL-CCNC: 35 U/L (ref 5–40)
BASOPHILS ABSOLUTE: 0.1 THOU/MM3 (ref 0–0.1)
BASOPHILS NFR BLD AUTO: 0.5 %
BILIRUB CONJ SERPL-MCNC: < 0.2 MG/DL (ref 0–0.3)
BILIRUB SERPL-MCNC: 0.3 MG/DL (ref 0.3–1.2)
BUN SERPL-MCNC: 19 MG/DL (ref 7–22)
CALCIUM SERPL-MCNC: 8.9 MG/DL (ref 8.5–10.5)
CHLORIDE SERPL-SCNC: 106 MEQ/L (ref 98–111)
CO2 SERPL-SCNC: 15 MEQ/L (ref 23–33)
CREAT SERPL-MCNC: 1.3 MG/DL (ref 0.4–1.2)
DEPRECATED RDW RBC AUTO: 52.9 FL (ref 35–45)
EOSINOPHIL NFR BLD AUTO: 0.2 %
EOSINOPHILS ABSOLUTE: 0 THOU/MM3 (ref 0–0.4)
ERYTHROCYTE [DISTWIDTH] IN BLOOD BY AUTOMATED COUNT: 14.6 % (ref 11.5–14.5)
GFR SERPL CREATININE-BSD FRML MDRD: 39 ML/MIN/1.73M2
GLUCOSE SERPL-MCNC: 244 MG/DL (ref 70–108)
HCT VFR BLD AUTO: 37.4 % (ref 37–47)
HGB BLD-MCNC: 11 GM/DL (ref 12–16)
IMM GRANULOCYTES # BLD AUTO: 0.1 THOU/MM3 (ref 0–0.07)
IMM GRANULOCYTES NFR BLD AUTO: 0.7 %
INR PPP: 0.88 (ref 0.85–1.13)
LYMPHOCYTES ABSOLUTE: 1.5 THOU/MM3 (ref 1–4.8)
LYMPHOCYTES NFR BLD AUTO: 10 %
MCH RBC QN AUTO: 29.4 PG (ref 26–33)
MCHC RBC AUTO-ENTMCNC: 29.4 GM/DL (ref 32.2–35.5)
MCV RBC AUTO: 100 FL (ref 81–99)
MONOCYTES ABSOLUTE: 0.7 THOU/MM3 (ref 0.4–1.3)
MONOCYTES NFR BLD AUTO: 4.9 %
NEUTROPHILS NFR BLD AUTO: 83.7 %
NRBC BLD AUTO-RTO: 0 /100 WBC
OSMOLALITY SERPL CALC.SUM OF ELEC: 282.3 MOSMOL/KG (ref 275–300)
PLATELET # BLD AUTO: 298 THOU/MM3 (ref 130–400)
PMV BLD AUTO: 8.6 FL (ref 9.4–12.4)
POTASSIUM SERPL-SCNC: 3.8 MEQ/L (ref 3.5–5.2)
PROT SERPL-MCNC: 6.4 G/DL (ref 6.1–8)
RBC # BLD AUTO: 3.74 MILL/MM3 (ref 4.2–5.4)
SEGMENTED NEUTROPHILS ABSOLUTE COUNT: 12.7 THOU/MM3 (ref 1.8–7.7)
SODIUM SERPL-SCNC: 136 MEQ/L (ref 135–145)
TROPONIN T: < 0.01 NG/ML
WBC # BLD AUTO: 15.2 THOU/MM3 (ref 4.8–10.8)

## 2023-06-09 PROCEDURE — 6360000002 HC RX W HCPCS

## 2023-06-09 PROCEDURE — 80053 COMPREHEN METABOLIC PANEL: CPT

## 2023-06-09 PROCEDURE — 85610 PROTHROMBIN TIME: CPT

## 2023-06-09 PROCEDURE — 72125 CT NECK SPINE W/O DYE: CPT

## 2023-06-09 PROCEDURE — 96376 TX/PRO/DX INJ SAME DRUG ADON: CPT

## 2023-06-09 PROCEDURE — 6360000004 HC RX CONTRAST MEDICATION

## 2023-06-09 PROCEDURE — 71260 CT THORAX DX C+: CPT

## 2023-06-09 PROCEDURE — 99285 EMERGENCY DEPT VISIT HI MDM: CPT

## 2023-06-09 PROCEDURE — 72170 X-RAY EXAM OF PELVIS: CPT

## 2023-06-09 PROCEDURE — 6360000002 HC RX W HCPCS: Performed by: PHYSICIAN ASSISTANT

## 2023-06-09 PROCEDURE — 96374 THER/PROPH/DIAG INJ IV PUSH: CPT

## 2023-06-09 PROCEDURE — 36415 COLL VENOUS BLD VENIPUNCTURE: CPT

## 2023-06-09 PROCEDURE — 99222 1ST HOSP IP/OBS MODERATE 55: CPT | Performed by: NURSE PRACTITIONER

## 2023-06-09 PROCEDURE — 82248 BILIRUBIN DIRECT: CPT

## 2023-06-09 PROCEDURE — 6820000001 HC L2 TRAUMA SURGERY EVALUATION: Performed by: SURGERY

## 2023-06-09 PROCEDURE — 73060 X-RAY EXAM OF HUMERUS: CPT

## 2023-06-09 PROCEDURE — 6370000000 HC RX 637 (ALT 250 FOR IP): Performed by: PHYSICIAN ASSISTANT

## 2023-06-09 PROCEDURE — 70450 CT HEAD/BRAIN W/O DYE: CPT

## 2023-06-09 PROCEDURE — APPSS30 APP SPLIT SHARED TIME 16-30 MINUTES: Performed by: NURSE PRACTITIONER

## 2023-06-09 PROCEDURE — 96375 TX/PRO/DX INJ NEW DRUG ADDON: CPT

## 2023-06-09 PROCEDURE — 84484 ASSAY OF TROPONIN QUANT: CPT

## 2023-06-09 PROCEDURE — 93005 ELECTROCARDIOGRAM TRACING: CPT | Performed by: NURSE PRACTITIONER

## 2023-06-09 PROCEDURE — 85025 COMPLETE CBC W/AUTO DIFF WBC: CPT

## 2023-06-09 PROCEDURE — 1200000000 HC SEMI PRIVATE

## 2023-06-09 RX ORDER — LANOLIN ALCOHOL/MO/W.PET/CERES
5 CREAM (GRAM) TOPICAL NIGHTLY
Status: DISCONTINUED | OUTPATIENT
Start: 2023-06-09 | End: 2023-06-16 | Stop reason: HOSPADM

## 2023-06-09 RX ORDER — POLYETHYLENE GLYCOL 3350 17 G/17G
17 POWDER, FOR SOLUTION ORAL DAILY PRN
Status: DISCONTINUED | OUTPATIENT
Start: 2023-06-09 | End: 2023-06-16 | Stop reason: HOSPADM

## 2023-06-09 RX ORDER — BUDESONIDE 3 MG/1
6 CAPSULE, COATED PELLETS ORAL EVERY MORNING
Status: DISCONTINUED | OUTPATIENT
Start: 2023-06-10 | End: 2023-06-16 | Stop reason: HOSPADM

## 2023-06-09 RX ORDER — ONDANSETRON 4 MG/1
4 TABLET, ORALLY DISINTEGRATING ORAL EVERY 8 HOURS PRN
Status: DISCONTINUED | OUTPATIENT
Start: 2023-06-09 | End: 2023-06-16 | Stop reason: HOSPADM

## 2023-06-09 RX ORDER — MORPHINE SULFATE 2 MG/ML
2 INJECTION, SOLUTION INTRAMUSCULAR; INTRAVENOUS ONCE
Status: DISCONTINUED | OUTPATIENT
Start: 2023-06-09 | End: 2023-06-15 | Stop reason: ALTCHOICE

## 2023-06-09 RX ORDER — SODIUM CHLORIDE 9 MG/ML
INJECTION, SOLUTION INTRAVENOUS PRN
Status: DISCONTINUED | OUTPATIENT
Start: 2023-06-09 | End: 2023-06-16 | Stop reason: HOSPADM

## 2023-06-09 RX ORDER — BISACODYL 5 MG/1
5 TABLET, DELAYED RELEASE ORAL DAILY
Status: DISCONTINUED | OUTPATIENT
Start: 2023-06-09 | End: 2023-06-16 | Stop reason: HOSPADM

## 2023-06-09 RX ORDER — AMLODIPINE BESYLATE 2.5 MG/1
2.5 TABLET ORAL
Status: DISCONTINUED | OUTPATIENT
Start: 2023-06-09 | End: 2023-06-16 | Stop reason: HOSPADM

## 2023-06-09 RX ORDER — OXYCODONE HYDROCHLORIDE 5 MG/1
10 TABLET ORAL EVERY 4 HOURS PRN
Status: DISCONTINUED | OUTPATIENT
Start: 2023-06-09 | End: 2023-06-16 | Stop reason: HOSPADM

## 2023-06-09 RX ORDER — SODIUM CHLORIDE 0.9 % (FLUSH) 0.9 %
5-40 SYRINGE (ML) INJECTION PRN
Status: DISCONTINUED | OUTPATIENT
Start: 2023-06-09 | End: 2023-06-16 | Stop reason: HOSPADM

## 2023-06-09 RX ORDER — MORPHINE SULFATE 2 MG/ML
2 INJECTION, SOLUTION INTRAMUSCULAR; INTRAVENOUS
Status: DISCONTINUED | OUTPATIENT
Start: 2023-06-09 | End: 2023-06-16 | Stop reason: HOSPADM

## 2023-06-09 RX ORDER — LEVETIRACETAM 250 MG/1
250 TABLET ORAL NIGHTLY
Status: DISCONTINUED | OUTPATIENT
Start: 2023-06-09 | End: 2023-06-16 | Stop reason: HOSPADM

## 2023-06-09 RX ORDER — ACETAMINOPHEN 325 MG/1
650 TABLET ORAL EVERY 6 HOURS
Status: DISCONTINUED | OUTPATIENT
Start: 2023-06-09 | End: 2023-06-16 | Stop reason: HOSPADM

## 2023-06-09 RX ORDER — QUETIAPINE FUMARATE 25 MG/1
25 TABLET, FILM COATED ORAL NIGHTLY
Status: DISCONTINUED | OUTPATIENT
Start: 2023-06-09 | End: 2023-06-10

## 2023-06-09 RX ORDER — FENTANYL CITRATE 50 UG/ML
INJECTION, SOLUTION INTRAMUSCULAR; INTRAVENOUS DAILY PRN
Status: DISCONTINUED | OUTPATIENT
Start: 2023-06-09 | End: 2023-06-16 | Stop reason: HOSPADM

## 2023-06-09 RX ORDER — PANTOPRAZOLE SODIUM 40 MG/1
40 TABLET, DELAYED RELEASE ORAL
Status: DISCONTINUED | OUTPATIENT
Start: 2023-06-10 | End: 2023-06-16 | Stop reason: HOSPADM

## 2023-06-09 RX ORDER — MULTIVITAMIN WITH IRON
1 TABLET ORAL DAILY
Status: DISCONTINUED | OUTPATIENT
Start: 2023-06-10 | End: 2023-06-16 | Stop reason: HOSPADM

## 2023-06-09 RX ORDER — FENTANYL CITRATE 50 UG/ML
INJECTION, SOLUTION INTRAMUSCULAR; INTRAVENOUS
Status: COMPLETED
Start: 2023-06-09 | End: 2023-06-09

## 2023-06-09 RX ORDER — MORPHINE SULFATE 2 MG/ML
2 INJECTION, SOLUTION INTRAMUSCULAR; INTRAVENOUS ONCE
Status: COMPLETED | OUTPATIENT
Start: 2023-06-09 | End: 2023-06-09

## 2023-06-09 RX ORDER — SODIUM CHLORIDE 0.9 % (FLUSH) 0.9 %
5-40 SYRINGE (ML) INJECTION EVERY 12 HOURS SCHEDULED
Status: DISCONTINUED | OUTPATIENT
Start: 2023-06-09 | End: 2023-06-16 | Stop reason: HOSPADM

## 2023-06-09 RX ORDER — MIRTAZAPINE 7.5 MG/1
7.5 TABLET, FILM COATED ORAL NIGHTLY
Status: DISCONTINUED | OUTPATIENT
Start: 2023-06-09 | End: 2023-06-16 | Stop reason: HOSPADM

## 2023-06-09 RX ORDER — OXYCODONE HYDROCHLORIDE 5 MG/1
5 TABLET ORAL EVERY 4 HOURS PRN
Status: DISCONTINUED | OUTPATIENT
Start: 2023-06-09 | End: 2023-06-16 | Stop reason: HOSPADM

## 2023-06-09 RX ORDER — MORPHINE SULFATE 4 MG/ML
4 INJECTION, SOLUTION INTRAMUSCULAR; INTRAVENOUS
Status: DISCONTINUED | OUTPATIENT
Start: 2023-06-09 | End: 2023-06-16 | Stop reason: HOSPADM

## 2023-06-09 RX ORDER — ONDANSETRON 2 MG/ML
4 INJECTION INTRAMUSCULAR; INTRAVENOUS EVERY 6 HOURS PRN
Status: DISCONTINUED | OUTPATIENT
Start: 2023-06-09 | End: 2023-06-16 | Stop reason: HOSPADM

## 2023-06-09 RX ORDER — MORPHINE SULFATE 4 MG/ML
4 INJECTION, SOLUTION INTRAMUSCULAR; INTRAVENOUS ONCE
Status: COMPLETED | OUTPATIENT
Start: 2023-06-09 | End: 2023-06-09

## 2023-06-09 RX ORDER — VITAMIN B COMPLEX
1000 TABLET ORAL DAILY
Status: DISCONTINUED | OUTPATIENT
Start: 2023-06-10 | End: 2023-06-16 | Stop reason: HOSPADM

## 2023-06-09 RX ORDER — ATORVASTATIN CALCIUM 40 MG/1
40 TABLET, FILM COATED ORAL NIGHTLY
Status: DISCONTINUED | OUTPATIENT
Start: 2023-06-09 | End: 2023-06-16 | Stop reason: HOSPADM

## 2023-06-09 RX ORDER — MORPHINE SULFATE 2 MG/ML
INJECTION, SOLUTION INTRAMUSCULAR; INTRAVENOUS
Status: COMPLETED
Start: 2023-06-09 | End: 2023-06-09

## 2023-06-09 RX ORDER — LEVETIRACETAM 500 MG/1
500 TABLET ORAL DAILY
Status: DISCONTINUED | OUTPATIENT
Start: 2023-06-10 | End: 2023-06-16 | Stop reason: HOSPADM

## 2023-06-09 RX ORDER — FAMOTIDINE 20 MG/1
20 TABLET, FILM COATED ORAL NIGHTLY
Status: DISCONTINUED | OUTPATIENT
Start: 2023-06-10 | End: 2023-06-16 | Stop reason: HOSPADM

## 2023-06-09 RX ORDER — SODIUM CHLORIDE 9 MG/ML
INJECTION, SOLUTION INTRAVENOUS CONTINUOUS
Status: DISCONTINUED | OUTPATIENT
Start: 2023-06-09 | End: 2023-06-14

## 2023-06-09 RX ADMIN — OXYCODONE 5 MG: 5 TABLET ORAL at 23:38

## 2023-06-09 RX ADMIN — FENTANYL CITRATE 50 MCG: 50 INJECTION, SOLUTION INTRAMUSCULAR; INTRAVENOUS at 15:21

## 2023-06-09 RX ADMIN — FENTANYL CITRATE 50 MCG: 0.05 INJECTION, SOLUTION INTRAMUSCULAR; INTRAVENOUS at 15:03

## 2023-06-09 RX ADMIN — MORPHINE SULFATE 4 MG: 4 INJECTION, SOLUTION INTRAMUSCULAR; INTRAVENOUS at 20:50

## 2023-06-09 RX ADMIN — IOPAMIDOL 80 ML: 755 INJECTION, SOLUTION INTRAVENOUS at 18:12

## 2023-06-09 RX ADMIN — MORPHINE SULFATE 4 MG: 4 INJECTION, SOLUTION INTRAMUSCULAR; INTRAVENOUS at 23:32

## 2023-06-09 RX ADMIN — MORPHINE SULFATE 2 MG: 2 INJECTION, SOLUTION INTRAMUSCULAR; INTRAVENOUS at 17:40

## 2023-06-09 RX ADMIN — MORPHINE SULFATE 4 MG: 4 INJECTION, SOLUTION INTRAMUSCULAR; INTRAVENOUS at 16:23

## 2023-06-09 RX ADMIN — MORPHINE SULFATE 2 MG: 2 INJECTION, SOLUTION INTRAMUSCULAR; INTRAVENOUS at 19:21

## 2023-06-09 ASSESSMENT — ENCOUNTER SYMPTOMS
NAUSEA: 0
VOICE CHANGE: 0
PHOTOPHOBIA: 0
EYE PAIN: 0
EYE ITCHING: 0
WHEEZING: 0
ABDOMINAL DISTENTION: 0
FACIAL SWELLING: 0
BLOOD IN STOOL: 0
DIARRHEA: 0
EYE DISCHARGE: 0
SINUS PRESSURE: 0
CHOKING: 0
APNEA: 0
ABDOMINAL PAIN: 0
CHEST TIGHTNESS: 0
EYE REDNESS: 0
BACK PAIN: 0
RHINORRHEA: 0
COLOR CHANGE: 0
COUGH: 0
VOMITING: 0
SHORTNESS OF BREATH: 0
TROUBLE SWALLOWING: 0
CONSTIPATION: 0
SORE THROAT: 0
STRIDOR: 0

## 2023-06-09 ASSESSMENT — PAIN SCALES - GENERAL
PAINLEVEL_OUTOF10: 10
PAINLEVEL_OUTOF10: 4
PAINLEVEL_OUTOF10: 10
PAINLEVEL_OUTOF10: 4

## 2023-06-09 ASSESSMENT — PAIN DESCRIPTION - LOCATION: LOCATION: ARM

## 2023-06-09 ASSESSMENT — PAIN DESCRIPTION - ORIENTATION: ORIENTATION: LEFT;RIGHT

## 2023-06-09 ASSESSMENT — PAIN DESCRIPTION - DESCRIPTORS: DESCRIPTORS: ACHING

## 2023-06-10 ENCOUNTER — APPOINTMENT (OUTPATIENT)
Dept: MRI IMAGING | Age: 88
DRG: 483 | End: 2023-06-10
Payer: MEDICARE

## 2023-06-10 ENCOUNTER — APPOINTMENT (OUTPATIENT)
Dept: INTERVENTIONAL RADIOLOGY/VASCULAR | Age: 88
DRG: 483 | End: 2023-06-10
Payer: MEDICARE
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
## 2023-06-10 PROBLEM — S72.141A CLOSED COMMINUTED INTERTROCHANTERIC FRACTURE OF PROXIMAL END OF RIGHT FEMUR, INITIAL ENCOUNTER (HCC): Status: RESOLVED | Noted: 2021-02-04 | Resolved: 2023-06-10

## 2023-06-10 PROBLEM — S01.81XA FOREHEAD LACERATION: Status: ACTIVE | Noted: 2023-06-10

## 2023-06-10 PROBLEM — S42.292A CLOSED FRACTURE OF ANATOMICAL NECK OF LEFT HUMERUS: Status: ACTIVE | Noted: 2023-06-10

## 2023-06-10 PROBLEM — D72.829 LEUKOCYTOSIS: Status: ACTIVE | Noted: 2023-06-10

## 2023-06-10 PROBLEM — R73.02 IMPAIRED GLUCOSE TOLERANCE: Status: ACTIVE | Noted: 2023-06-10

## 2023-06-10 LAB
ANION GAP SERPL CALC-SCNC: 14 MEQ/L (ref 8–16)
BUN SERPL-MCNC: 21 MG/DL (ref 7–22)
CALCIUM SERPL-MCNC: 8.6 MG/DL (ref 8.5–10.5)
CHLORIDE SERPL-SCNC: 106 MEQ/L (ref 98–111)
CK SERPL-CCNC: 164 U/L (ref 30–135)
CO2 SERPL-SCNC: 16 MEQ/L (ref 23–33)
CREAT SERPL-MCNC: 1.4 MG/DL (ref 0.4–1.2)
CRP SERPL-MCNC: 2.95 MG/DL (ref 0–1)
DEPRECATED MEAN GLUCOSE BLD GHB EST-ACNC: 102 MG/DL (ref 70–126)
EKG ATRIAL RATE: 91 BPM
EKG P AXIS: 47 DEGREES
EKG P-R INTERVAL: 138 MS
EKG Q-T INTERVAL: 414 MS
EKG QRS DURATION: 122 MS
EKG QTC CALCULATION (BAZETT): 509 MS
EKG R AXIS: 107 DEGREES
EKG T AXIS: -40 DEGREES
EKG VENTRICULAR RATE: 91 BPM
GFR SERPL CREATININE-BSD FRML MDRD: 35 ML/MIN/1.73M2
GLUCOSE SERPL-MCNC: 148 MG/DL (ref 70–108)
HBA1C MFR BLD HPLC: 5.4 % (ref 4.4–6.4)
MAGNESIUM SERPL-MCNC: 1.8 MG/DL (ref 1.6–2.4)
PH BLDV: 7.51 [PH] (ref 7.31–7.41)
POTASSIUM SERPL-SCNC: 4 MEQ/L (ref 3.5–5.2)
PROCALCITONIN SERPL IA-MCNC: 0.32 NG/ML (ref 0.01–0.09)
SODIUM SERPL-SCNC: 136 MEQ/L (ref 135–145)
TSH SERPL DL<=0.005 MIU/L-ACNC: 1.4 UIU/ML (ref 0.4–4.2)

## 2023-06-10 PROCEDURE — 82550 ASSAY OF CK (CPK): CPT

## 2023-06-10 PROCEDURE — 6360000002 HC RX W HCPCS: Performed by: PHYSICIAN ASSISTANT

## 2023-06-10 PROCEDURE — 6370000000 HC RX 637 (ALT 250 FOR IP): Performed by: PHYSICIAN ASSISTANT

## 2023-06-10 PROCEDURE — 83036 HEMOGLOBIN GLYCOSYLATED A1C: CPT

## 2023-06-10 PROCEDURE — 84145 PROCALCITONIN (PCT): CPT

## 2023-06-10 PROCEDURE — 1200000000 HC SEMI PRIVATE

## 2023-06-10 PROCEDURE — 70551 MRI BRAIN STEM W/O DYE: CPT

## 2023-06-10 PROCEDURE — 86140 C-REACTIVE PROTEIN: CPT

## 2023-06-10 PROCEDURE — 84443 ASSAY THYROID STIM HORMONE: CPT

## 2023-06-10 PROCEDURE — 93010 ELECTROCARDIOGRAM REPORT: CPT | Performed by: INTERNAL MEDICINE

## 2023-06-10 PROCEDURE — 36415 COLL VENOUS BLD VENIPUNCTURE: CPT

## 2023-06-10 PROCEDURE — 80177 DRUG SCRN QUAN LEVETIRACETAM: CPT

## 2023-06-10 PROCEDURE — 2580000003 HC RX 258: Performed by: PHYSICIAN ASSISTANT

## 2023-06-10 PROCEDURE — 84080 ASSAY ALKALINE PHOSPHATASES: CPT

## 2023-06-10 PROCEDURE — 82800 BLOOD PH: CPT

## 2023-06-10 PROCEDURE — 99222 1ST HOSP IP/OBS MODERATE 55: CPT | Performed by: SURGERY

## 2023-06-10 PROCEDURE — 84075 ASSAY ALKALINE PHOSPHATASE: CPT

## 2023-06-10 PROCEDURE — 6370000000 HC RX 637 (ALT 250 FOR IP): Performed by: NURSE PRACTITIONER

## 2023-06-10 PROCEDURE — 80048 BASIC METABOLIC PNL TOTAL CA: CPT

## 2023-06-10 PROCEDURE — 83735 ASSAY OF MAGNESIUM: CPT

## 2023-06-10 PROCEDURE — 93970 EXTREMITY STUDY: CPT

## 2023-06-10 RX ADMIN — SODIUM CHLORIDE: 9 INJECTION, SOLUTION INTRAVENOUS at 23:13

## 2023-06-10 RX ADMIN — SODIUM CHLORIDE: 9 INJECTION, SOLUTION INTRAVENOUS at 06:54

## 2023-06-10 RX ADMIN — SODIUM CHLORIDE, PRESERVATIVE FREE 10 ML: 5 INJECTION INTRAVENOUS at 03:01

## 2023-06-10 RX ADMIN — ACETAMINOPHEN 650 MG: 325 TABLET ORAL at 14:05

## 2023-06-10 RX ADMIN — MIRTAZAPINE 7.5 MG: 7.5 TABLET ORAL at 03:15

## 2023-06-10 RX ADMIN — MORPHINE SULFATE 4 MG: 4 INJECTION, SOLUTION INTRAMUSCULAR; INTRAVENOUS at 14:04

## 2023-06-10 RX ADMIN — ACETAMINOPHEN 650 MG: 325 TABLET ORAL at 09:59

## 2023-06-10 RX ADMIN — PANTOPRAZOLE SODIUM 40 MG: 40 TABLET, DELAYED RELEASE ORAL at 06:51

## 2023-06-10 RX ADMIN — Medication 1 TABLET: at 09:59

## 2023-06-10 RX ADMIN — OXYCODONE 10 MG: 5 TABLET ORAL at 03:16

## 2023-06-10 RX ADMIN — MORPHINE SULFATE 4 MG: 4 INJECTION, SOLUTION INTRAMUSCULAR; INTRAVENOUS at 01:48

## 2023-06-10 RX ADMIN — Medication 4.5 MG: at 03:15

## 2023-06-10 RX ADMIN — METOPROLOL TARTRATE 25 MG: 25 TABLET, FILM COATED ORAL at 09:59

## 2023-06-10 RX ADMIN — LEVETIRACETAM 500 MG: 500 TABLET, FILM COATED ORAL at 09:58

## 2023-06-10 RX ADMIN — OXYCODONE 10 MG: 5 TABLET ORAL at 11:55

## 2023-06-10 RX ADMIN — AMLODIPINE BESYLATE 2.5 MG: 2.5 TABLET ORAL at 12:22

## 2023-06-10 RX ADMIN — MORPHINE SULFATE 4 MG: 4 INJECTION, SOLUTION INTRAMUSCULAR; INTRAVENOUS at 09:21

## 2023-06-10 RX ADMIN — BUDESONIDE 6 MG: 3 CAPSULE, GELATIN COATED ORAL at 09:59

## 2023-06-10 RX ADMIN — SODIUM CHLORIDE: 9 INJECTION, SOLUTION INTRAVENOUS at 16:05

## 2023-06-10 RX ADMIN — MORPHINE SULFATE 4 MG: 4 INJECTION, SOLUTION INTRAMUSCULAR; INTRAVENOUS at 16:02

## 2023-06-10 RX ADMIN — MORPHINE SULFATE 4 MG: 4 INJECTION, SOLUTION INTRAMUSCULAR; INTRAVENOUS at 06:50

## 2023-06-10 RX ADMIN — ACETAMINOPHEN 650 MG: 325 TABLET ORAL at 03:15

## 2023-06-10 RX ADMIN — Medication 1000 UNITS: at 09:59

## 2023-06-10 ASSESSMENT — PAIN SCALES - GENERAL
PAINLEVEL_OUTOF10: 7
PAINLEVEL_OUTOF10: 8
PAINLEVEL_OUTOF10: 7
PAINLEVEL_OUTOF10: 8

## 2023-06-10 ASSESSMENT — PAIN DESCRIPTION - LOCATION
LOCATION: ARM
LOCATION: ARM;NECK
LOCATION: ARM
LOCATION: NECK;BACK
LOCATION: ARM

## 2023-06-10 ASSESSMENT — PAIN DESCRIPTION - DESCRIPTORS
DESCRIPTORS: ACHING

## 2023-06-10 ASSESSMENT — PAIN DESCRIPTION - ORIENTATION
ORIENTATION: RIGHT;LEFT
ORIENTATION: RIGHT;LEFT;POSTERIOR
ORIENTATION: RIGHT;LEFT

## 2023-06-11 LAB
ABO: NORMAL
ANION GAP SERPL CALC-SCNC: 15 MEQ/L (ref 8–16)
ANION GAP SERPL CALC-SCNC: 17 MEQ/L (ref 8–16)
ANION GAP SERPL CALC-SCNC: 17 MEQ/L (ref 8–16)
ANTIBODY SCREEN: NORMAL
BUN SERPL-MCNC: 17 MG/DL (ref 7–22)
BUN SERPL-MCNC: 20 MG/DL (ref 7–22)
BUN SERPL-MCNC: 20 MG/DL (ref 7–22)
CALCIUM SERPL-MCNC: 7.6 MG/DL (ref 8.5–10.5)
CALCIUM SERPL-MCNC: 8 MG/DL (ref 8.5–10.5)
CALCIUM SERPL-MCNC: 8 MG/DL (ref 8.5–10.5)
CHLORIDE SERPL-SCNC: 104 MEQ/L (ref 98–111)
CHLORIDE SERPL-SCNC: 104 MEQ/L (ref 98–111)
CHLORIDE SERPL-SCNC: 105 MEQ/L (ref 98–111)
CO2 SERPL-SCNC: 12 MEQ/L (ref 23–33)
CO2 SERPL-SCNC: 13 MEQ/L (ref 23–33)
CO2 SERPL-SCNC: 15 MEQ/L (ref 23–33)
CREAT SERPL-MCNC: 1.2 MG/DL (ref 0.4–1.2)
CREAT SERPL-MCNC: 1.3 MG/DL (ref 0.4–1.2)
CREAT SERPL-MCNC: 1.4 MG/DL (ref 0.4–1.2)
EKG ATRIAL RATE: 76 BPM
EKG P-R INTERVAL: 130 MS
EKG Q-T INTERVAL: 432 MS
EKG QRS DURATION: 130 MS
EKG QTC CALCULATION (BAZETT): 486 MS
EKG R AXIS: 35 DEGREES
EKG T AXIS: -42 DEGREES
EKG VENTRICULAR RATE: 76 BPM
GFR SERPL CREATININE-BSD FRML MDRD: 35 ML/MIN/1.73M2
GFR SERPL CREATININE-BSD FRML MDRD: 39 ML/MIN/1.73M2
GFR SERPL CREATININE-BSD FRML MDRD: 42 ML/MIN/1.73M2
GLUCOSE BLD STRIP.AUTO-MCNC: 100 MG/DL (ref 70–108)
GLUCOSE SERPL-MCNC: 116 MG/DL (ref 70–108)
GLUCOSE SERPL-MCNC: 123 MG/DL (ref 70–108)
GLUCOSE SERPL-MCNC: 157 MG/DL (ref 70–108)
LACTATE SERPL-SCNC: 1.2 MMOL/L (ref 0.5–2)
NT-PROBNP SERPL IA-MCNC: 2596 PG/ML (ref 0–449)
POTASSIUM SERPL-SCNC: 3.2 MEQ/L (ref 3.5–5.2)
POTASSIUM SERPL-SCNC: 3.4 MEQ/L (ref 3.5–5.2)
POTASSIUM SERPL-SCNC: 3.8 MEQ/L (ref 3.5–5.2)
RH FACTOR: NORMAL
SCAN OF BLOOD SMEAR: NORMAL
SODIUM SERPL-SCNC: 134 MEQ/L (ref 135–145)

## 2023-06-11 PROCEDURE — 83605 ASSAY OF LACTIC ACID: CPT

## 2023-06-11 PROCEDURE — 80048 BASIC METABOLIC PNL TOTAL CA: CPT

## 2023-06-11 PROCEDURE — 82948 REAGENT STRIP/BLOOD GLUCOSE: CPT

## 2023-06-11 PROCEDURE — 92610 EVALUATE SWALLOWING FUNCTION: CPT

## 2023-06-11 PROCEDURE — 6370000000 HC RX 637 (ALT 250 FOR IP): Performed by: NURSE PRACTITIONER

## 2023-06-11 PROCEDURE — 93010 ELECTROCARDIOGRAM REPORT: CPT | Performed by: INTERNAL MEDICINE

## 2023-06-11 PROCEDURE — 36415 COLL VENOUS BLD VENIPUNCTURE: CPT

## 2023-06-11 PROCEDURE — 6360000002 HC RX W HCPCS: Performed by: PHYSICIAN ASSISTANT

## 2023-06-11 PROCEDURE — 86850 RBC ANTIBODY SCREEN: CPT

## 2023-06-11 PROCEDURE — 2580000003 HC RX 258: Performed by: PHYSICIAN ASSISTANT

## 2023-06-11 PROCEDURE — 6370000000 HC RX 637 (ALT 250 FOR IP): Performed by: PHYSICIAN ASSISTANT

## 2023-06-11 PROCEDURE — 86923 COMPATIBILITY TEST ELECTRIC: CPT

## 2023-06-11 PROCEDURE — 86900 BLOOD TYPING SEROLOGIC ABO: CPT

## 2023-06-11 PROCEDURE — 99233 SBSQ HOSP IP/OBS HIGH 50: CPT | Performed by: PHYSICIAN ASSISTANT

## 2023-06-11 PROCEDURE — 2500000003 HC RX 250 WO HCPCS: Performed by: PHYSICIAN ASSISTANT

## 2023-06-11 PROCEDURE — 36430 TRANSFUSION BLD/BLD COMPNT: CPT

## 2023-06-11 PROCEDURE — P9016 RBC LEUKOCYTES REDUCED: HCPCS

## 2023-06-11 PROCEDURE — 1200000000 HC SEMI PRIVATE

## 2023-06-11 PROCEDURE — 85027 COMPLETE CBC AUTOMATED: CPT

## 2023-06-11 PROCEDURE — 93005 ELECTROCARDIOGRAM TRACING: CPT | Performed by: PHYSICIAN ASSISTANT

## 2023-06-11 PROCEDURE — 83880 ASSAY OF NATRIURETIC PEPTIDE: CPT

## 2023-06-11 PROCEDURE — 86901 BLOOD TYPING SEROLOGIC RH(D): CPT

## 2023-06-11 PROCEDURE — 92526 ORAL FUNCTION THERAPY: CPT

## 2023-06-11 RX ORDER — POTASSIUM CHLORIDE 20 MEQ/1
40 TABLET, EXTENDED RELEASE ORAL PRN
Status: DISCONTINUED | OUTPATIENT
Start: 2023-06-11 | End: 2023-06-16 | Stop reason: HOSPADM

## 2023-06-11 RX ORDER — SODIUM CHLORIDE 9 MG/ML
INJECTION, SOLUTION INTRAVENOUS PRN
Status: DISCONTINUED | OUTPATIENT
Start: 2023-06-11 | End: 2023-06-13

## 2023-06-11 RX ORDER — POTASSIUM CHLORIDE 7.45 MG/ML
10 INJECTION INTRAVENOUS PRN
Status: DISCONTINUED | OUTPATIENT
Start: 2023-06-11 | End: 2023-06-16 | Stop reason: HOSPADM

## 2023-06-11 RX ADMIN — ACETAMINOPHEN 650 MG: 325 TABLET ORAL at 21:08

## 2023-06-11 RX ADMIN — BUDESONIDE 6 MG: 3 CAPSULE, GELATIN COATED ORAL at 08:55

## 2023-06-11 RX ADMIN — ACETAMINOPHEN 650 MG: 325 TABLET ORAL at 08:52

## 2023-06-11 RX ADMIN — SODIUM CHLORIDE, PRESERVATIVE FREE 10 ML: 5 INJECTION INTRAVENOUS at 21:08

## 2023-06-11 RX ADMIN — MORPHINE SULFATE 4 MG: 4 INJECTION, SOLUTION INTRAMUSCULAR; INTRAVENOUS at 21:16

## 2023-06-11 RX ADMIN — MORPHINE SULFATE 4 MG: 4 INJECTION, SOLUTION INTRAMUSCULAR; INTRAVENOUS at 00:36

## 2023-06-11 RX ADMIN — ATORVASTATIN CALCIUM 40 MG: 40 TABLET, FILM COATED ORAL at 21:07

## 2023-06-11 RX ADMIN — LEVETIRACETAM 500 MG: 500 TABLET, FILM COATED ORAL at 08:55

## 2023-06-11 RX ADMIN — BISACODYL 5 MG: 5 TABLET, COATED ORAL at 08:55

## 2023-06-11 RX ADMIN — METOPROLOL TARTRATE 25 MG: 25 TABLET, FILM COATED ORAL at 08:55

## 2023-06-11 RX ADMIN — ACETAMINOPHEN 650 MG: 325 TABLET ORAL at 13:35

## 2023-06-11 RX ADMIN — MORPHINE SULFATE 2 MG: 2 INJECTION, SOLUTION INTRAMUSCULAR; INTRAVENOUS at 05:28

## 2023-06-11 RX ADMIN — MORPHINE SULFATE 2 MG: 2 INJECTION, SOLUTION INTRAMUSCULAR; INTRAVENOUS at 08:35

## 2023-06-11 RX ADMIN — MIRTAZAPINE 7.5 MG: 7.5 TABLET ORAL at 21:07

## 2023-06-11 RX ADMIN — SODIUM BICARBONATE: 84 INJECTION, SOLUTION INTRAVENOUS at 13:32

## 2023-06-11 RX ADMIN — POTASSIUM BICARBONATE 40 MEQ: 391 TABLET, EFFERVESCENT ORAL at 13:35

## 2023-06-11 RX ADMIN — Medication 1000 UNITS: at 08:55

## 2023-06-11 RX ADMIN — MORPHINE SULFATE 4 MG: 4 INJECTION, SOLUTION INTRAMUSCULAR; INTRAVENOUS at 15:19

## 2023-06-11 RX ADMIN — MORPHINE SULFATE 2 MG: 2 INJECTION, SOLUTION INTRAMUSCULAR; INTRAVENOUS at 18:38

## 2023-06-11 RX ADMIN — MORPHINE SULFATE 2 MG: 2 INJECTION, SOLUTION INTRAMUSCULAR; INTRAVENOUS at 11:51

## 2023-06-11 RX ADMIN — Medication 1 TABLET: at 08:55

## 2023-06-11 RX ADMIN — FAMOTIDINE 20 MG: 20 TABLET ORAL at 21:08

## 2023-06-11 RX ADMIN — SODIUM CHLORIDE: 9 INJECTION, SOLUTION INTRAVENOUS at 08:35

## 2023-06-11 RX ADMIN — LEVETIRACETAM 250 MG: 500 TABLET, FILM COATED ORAL at 21:08

## 2023-06-11 RX ADMIN — Medication 4.5 MG: at 21:08

## 2023-06-11 RX ADMIN — AMLODIPINE BESYLATE 2.5 MG: 2.5 TABLET ORAL at 12:08

## 2023-06-11 RX ADMIN — SODIUM CHLORIDE, PRESERVATIVE FREE 10 ML: 5 INJECTION INTRAVENOUS at 00:30

## 2023-06-11 RX ADMIN — PANTOPRAZOLE SODIUM 40 MG: 40 TABLET, DELAYED RELEASE ORAL at 08:52

## 2023-06-11 RX ADMIN — SODIUM CHLORIDE, PRESERVATIVE FREE 10 ML: 5 INJECTION INTRAVENOUS at 08:53

## 2023-06-11 RX ADMIN — METOPROLOL TARTRATE 25 MG: 25 TABLET, FILM COATED ORAL at 21:08

## 2023-06-11 RX ADMIN — OXYCODONE 10 MG: 5 TABLET ORAL at 23:27

## 2023-06-11 ASSESSMENT — PAIN DESCRIPTION - LOCATION
LOCATION: GENERALIZED
LOCATION: ARM
LOCATION: GENERALIZED
LOCATION: GENERALIZED
LOCATION: NECK
LOCATION: SHOULDER
LOCATION: ARM;SHOULDER

## 2023-06-11 ASSESSMENT — PAIN SCALES - WONG BAKER
WONGBAKER_NUMERICALRESPONSE: 4
WONGBAKER_NUMERICALRESPONSE: 2

## 2023-06-11 ASSESSMENT — PAIN DESCRIPTION - DESCRIPTORS
DESCRIPTORS: SORE;THROBBING
DESCRIPTORS: DISCOMFORT
DESCRIPTORS: THROBBING

## 2023-06-11 ASSESSMENT — PAIN DESCRIPTION - ORIENTATION
ORIENTATION: RIGHT;LEFT
ORIENTATION: RIGHT
ORIENTATION: LEFT;RIGHT

## 2023-06-11 ASSESSMENT — PAIN SCALES - GENERAL
PAINLEVEL_OUTOF10: 4
PAINLEVEL_OUTOF10: 7
PAINLEVEL_OUTOF10: 8
PAINLEVEL_OUTOF10: 8
PAINLEVEL_OUTOF10: 6
PAINLEVEL_OUTOF10: 4
PAINLEVEL_OUTOF10: 7
PAINLEVEL_OUTOF10: 0
PAINLEVEL_OUTOF10: 7
PAINLEVEL_OUTOF10: 9
PAINLEVEL_OUTOF10: 2
PAINLEVEL_OUTOF10: 2
PAINLEVEL_OUTOF10: 8

## 2023-06-11 ASSESSMENT — PAIN - FUNCTIONAL ASSESSMENT: PAIN_FUNCTIONAL_ASSESSMENT: PREVENTS OR INTERFERES WITH ALL ACTIVE AND SOME PASSIVE ACTIVITIES

## 2023-06-12 LAB
ANION GAP SERPL CALC-SCNC: 10 MEQ/L (ref 8–16)
BUN SERPL-MCNC: 17 MG/DL (ref 7–22)
CALCIUM SERPL-MCNC: 8 MG/DL (ref 8.5–10.5)
CHLORIDE SERPL-SCNC: 110 MEQ/L (ref 98–111)
CO2 SERPL-SCNC: 16 MEQ/L (ref 23–33)
CREAT SERPL-MCNC: 1.1 MG/DL (ref 0.4–1.2)
DEPRECATED RDW RBC AUTO: 57.8 FL (ref 35–45)
ERYTHROCYTE [DISTWIDTH] IN BLOOD BY AUTOMATED COUNT: 14.9 % (ref 11.5–14.5)
GFR SERPL CREATININE-BSD FRML MDRD: 47 ML/MIN/1.73M2
GLUCOSE SERPL-MCNC: 96 MG/DL (ref 70–108)
HCT VFR BLD AUTO: 24.9 % (ref 37–47)
HCT VFR BLD AUTO: 28.4 % (ref 37–47)
HCT VFR BLD AUTO: 30.8 % (ref 37–47)
HGB BLD-MCNC: 6.9 GM/DL (ref 12–16)
HGB BLD-MCNC: 8.4 GM/DL (ref 12–16)
HGB BLD-MCNC: 9.5 GM/DL (ref 12–16)
MCH RBC QN AUTO: 29.6 PG (ref 26–33)
MCHC RBC AUTO-ENTMCNC: 27.7 GM/DL (ref 32.2–35.5)
MCV RBC AUTO: 106.9 FL (ref 81–99)
PATHOLOGIST REVIEW: ABNORMAL
PLATELET # BLD AUTO: 173 THOU/MM3 (ref 130–400)
PMV BLD AUTO: 9 FL (ref 9.4–12.4)
POTASSIUM SERPL-SCNC: 3.4 MEQ/L (ref 3.5–5.2)
RBC # BLD AUTO: 2.33 MILL/MM3 (ref 4.2–5.4)
SODIUM SERPL-SCNC: 136 MEQ/L (ref 135–145)
WBC # BLD AUTO: 10.2 THOU/MM3 (ref 4.8–10.8)

## 2023-06-12 PROCEDURE — 36415 COLL VENOUS BLD VENIPUNCTURE: CPT

## 2023-06-12 PROCEDURE — 80048 BASIC METABOLIC PNL TOTAL CA: CPT

## 2023-06-12 PROCEDURE — 92610 EVALUATE SWALLOWING FUNCTION: CPT

## 2023-06-12 PROCEDURE — 2580000003 HC RX 258: Performed by: PHYSICIAN ASSISTANT

## 2023-06-12 PROCEDURE — 92526 ORAL FUNCTION THERAPY: CPT

## 2023-06-12 PROCEDURE — 85014 HEMATOCRIT: CPT

## 2023-06-12 PROCEDURE — 6370000000 HC RX 637 (ALT 250 FOR IP): Performed by: PHYSICIAN ASSISTANT

## 2023-06-12 PROCEDURE — 85018 HEMOGLOBIN: CPT

## 2023-06-12 PROCEDURE — 6360000002 HC RX W HCPCS: Performed by: PHYSICIAN ASSISTANT

## 2023-06-12 PROCEDURE — 1200000000 HC SEMI PRIVATE

## 2023-06-12 PROCEDURE — 6370000000 HC RX 637 (ALT 250 FOR IP): Performed by: NURSE PRACTITIONER

## 2023-06-12 PROCEDURE — 99233 SBSQ HOSP IP/OBS HIGH 50: CPT | Performed by: PHYSICIAN ASSISTANT

## 2023-06-12 RX ORDER — SODIUM BICARBONATE 650 MG/1
650 TABLET ORAL 4 TIMES DAILY
Status: DISCONTINUED | OUTPATIENT
Start: 2023-06-12 | End: 2023-06-16 | Stop reason: HOSPADM

## 2023-06-12 RX ORDER — SODIUM CHLORIDE 9 MG/ML
INJECTION, SOLUTION INTRAVENOUS PRN
Status: DISCONTINUED | OUTPATIENT
Start: 2023-06-12 | End: 2023-06-13

## 2023-06-12 RX ADMIN — FAMOTIDINE 20 MG: 20 TABLET ORAL at 20:37

## 2023-06-12 RX ADMIN — BUDESONIDE 6 MG: 3 CAPSULE, GELATIN COATED ORAL at 08:16

## 2023-06-12 RX ADMIN — METOPROLOL TARTRATE 25 MG: 25 TABLET, FILM COATED ORAL at 08:16

## 2023-06-12 RX ADMIN — SODIUM BICARBONATE 650 MG: 650 TABLET ORAL at 13:30

## 2023-06-12 RX ADMIN — ACETAMINOPHEN 650 MG: 325 TABLET ORAL at 20:37

## 2023-06-12 RX ADMIN — ACETAMINOPHEN 650 MG: 325 TABLET ORAL at 13:30

## 2023-06-12 RX ADMIN — MORPHINE SULFATE 4 MG: 4 INJECTION, SOLUTION INTRAMUSCULAR; INTRAVENOUS at 02:48

## 2023-06-12 RX ADMIN — PANTOPRAZOLE SODIUM 40 MG: 40 TABLET, DELAYED RELEASE ORAL at 04:17

## 2023-06-12 RX ADMIN — SODIUM CHLORIDE: 9 INJECTION, SOLUTION INTRAVENOUS at 17:36

## 2023-06-12 RX ADMIN — OXYCODONE 10 MG: 5 TABLET ORAL at 04:08

## 2023-06-12 RX ADMIN — SODIUM BICARBONATE 650 MG: 650 TABLET ORAL at 17:02

## 2023-06-12 RX ADMIN — ATORVASTATIN CALCIUM 40 MG: 40 TABLET, FILM COATED ORAL at 20:40

## 2023-06-12 RX ADMIN — MORPHINE SULFATE 2 MG: 2 INJECTION, SOLUTION INTRAMUSCULAR; INTRAVENOUS at 20:38

## 2023-06-12 RX ADMIN — LEVETIRACETAM 250 MG: 500 TABLET, FILM COATED ORAL at 20:41

## 2023-06-12 RX ADMIN — SODIUM CHLORIDE: 9 INJECTION, SOLUTION INTRAVENOUS at 01:50

## 2023-06-12 RX ADMIN — Medication 1 TABLET: at 08:16

## 2023-06-12 RX ADMIN — Medication 4.5 MG: at 20:37

## 2023-06-12 RX ADMIN — MORPHINE SULFATE 4 MG: 4 INJECTION, SOLUTION INTRAMUSCULAR; INTRAVENOUS at 00:24

## 2023-06-12 RX ADMIN — SODIUM CHLORIDE: 9 INJECTION, SOLUTION INTRAVENOUS at 09:52

## 2023-06-12 RX ADMIN — MORPHINE SULFATE 4 MG: 4 INJECTION, SOLUTION INTRAMUSCULAR; INTRAVENOUS at 05:52

## 2023-06-12 RX ADMIN — LEVETIRACETAM 500 MG: 500 TABLET, FILM COATED ORAL at 08:16

## 2023-06-12 RX ADMIN — Medication 1000 UNITS: at 08:17

## 2023-06-12 RX ADMIN — POTASSIUM BICARBONATE 40 MEQ: 391 TABLET, EFFERVESCENT ORAL at 08:17

## 2023-06-12 RX ADMIN — AMLODIPINE BESYLATE 2.5 MG: 2.5 TABLET ORAL at 11:28

## 2023-06-12 RX ADMIN — MORPHINE SULFATE 4 MG: 4 INJECTION, SOLUTION INTRAMUSCULAR; INTRAVENOUS at 11:19

## 2023-06-12 RX ADMIN — MIRTAZAPINE 7.5 MG: 7.5 TABLET ORAL at 20:41

## 2023-06-12 RX ADMIN — POTASSIUM BICARBONATE 40 MEQ: 391 TABLET, EFFERVESCENT ORAL at 04:09

## 2023-06-12 RX ADMIN — MORPHINE SULFATE 2 MG: 2 INJECTION, SOLUTION INTRAMUSCULAR; INTRAVENOUS at 22:21

## 2023-06-12 RX ADMIN — METOPROLOL TARTRATE 25 MG: 25 TABLET, FILM COATED ORAL at 20:41

## 2023-06-12 ASSESSMENT — PAIN SCALES - GENERAL
PAINLEVEL_OUTOF10: 7
PAINLEVEL_OUTOF10: 8
PAINLEVEL_OUTOF10: 0
PAINLEVEL_OUTOF10: 7
PAINLEVEL_OUTOF10: 3
PAINLEVEL_OUTOF10: 7

## 2023-06-12 ASSESSMENT — PAIN DESCRIPTION - ORIENTATION
ORIENTATION: RIGHT;LEFT
ORIENTATION: RIGHT;LEFT

## 2023-06-12 ASSESSMENT — PAIN DESCRIPTION - LOCATION
LOCATION: GENERALIZED
LOCATION: GENERALIZED
LOCATION: ARM
LOCATION: GENERALIZED
LOCATION: GENERALIZED
LOCATION: ARM
LOCATION: GENERALIZED

## 2023-06-12 NOTE — PALLIATIVE CARE
Initial Evaluation        Patient:   Audi Meier  YOB: 1931  Age:  80 y.o. Room:  [facility]  MRN:  [id number]   Acct: [de-identified]    Date of Admission:  6/9/2023  2:49 PM  Date of Service:  6/12/2023  Completed By:  Cindy Bui RN                 Reason for Palliative Care Evaluation:-               [] Code Status Discussion              [x] Goals of Care              [] Pain/Symptom Management               [] Emotional Support              [] Other:                    Current Issues:- patient currently resting quietly with eyes closed  []  Pain  []  Fatigue  []  Nausea  []  Anxiety  []  Depression  []  Shortness of Breath  []  Constipation  []  Appetite  []  Other:              Advance Directives:-    [x] [place] DNR Form:DNR CC completed 08/07/2020  [x] Living Will  [x] Medical POA              Current Code Status:-     [] Full Resuscitation  [] DNR-Comfort Care-Arrest  [] DNR-Comfort Care       [] Limited Resuscitation             [] No CPR            [] No shock            [] No ET intubation/reintubation            [] No resuscitative medications            [] Other limitation:               Palliative Performance Status:-      [] 100%  Full ambulation; normal activity and work; no evidence of disease; able to do own self care; normal intake; fully conscious     [] 90%   Full ambulation; normal activity and work; some evidence of disease; able to do own self care; normal intake; fully conscious    [] 80%   Full ambulation; normal activity with effort; some evidence of disease; able to do own self care; normal or reduced intake; fully conscious    [] 70%  Ambulation reduced; unable to perform normal job/work; significant  disease; able to do own self care; normal or reduced intake; fully conscious    [] 60%  Ambulation reduced; Significant disease;Can't do hobbies/housework; intake normal or reduced; occasional assist; LOC full/confusion    [] 50%  Mainly sit/lie;  Extensive disease;

## 2023-06-12 NOTE — CARE COORDINATION
6/12/23, 3:22 PM EDT  Discharge Planning Evaluation  Social work consult received, patient from [address] [place] [address]. Patient/Family preference is to return to [facility], per son . Would patient be willing to go to a skilled facility if needed: yes. Is there skilled care available at current facility: yes. Barriers to return to current living situation: not returning [place], going skilled  Spoke with [employer] at the facility. Bed available for patient on skilled when ready for discharge. Patient bed hold: no, will have bed  Anticipated transport plan: ambulance  Do they require COVID 19 test to return to ECF:no  Is there a required time frame which which COVID test needs done:n/a  Patient's Healthcare Decision Maker: Named in [facility]  Readmission Risk Low 0-14, Mod 15-19), High > 20: Readmission Risk Score: 20    Current PCP: Kaiser Jerry, DO  PCP verified by CM? Yes    Patient Orientation: Unresponsive    Patient Cognition: Dementia / Early Alzheimer's  History Provided by: Child/Family    Advance Directives:      Code Status: Limited   Patient's Primary Decision Maker is: Named in Scanned ACP Document       Discharge Planning:    Patient lives with: Alone Type of Home: [place]  Primary Care Giver:  Other (Comment) ([place])  Patient Support Systems include: Children   Current Financial resources: Medicare  Current community resources:    Current services prior to admission: Assisted Living            Current DME:              Type of Home Care services:  None    ADLS  Prior functional level: Assistance with the following:, Bathing, Dressing, Cooking, Housework, Mobility, Other (see comment) (AL)  Current functional level: Assistance with the following:, Bathing, Dressing, Cooking, Housework, Mobility, Feeding, Other (see comment) (ECF)    Family can provide assistance at DC: No  Would you like Case Management to discuss the discharge plan with any other family members/significant

## 2023-06-12 NOTE — CARE COORDINATION
6/12/23, 1:34 PM EDT      DISCHARGE PLANNING EVALUATION    Chris Schmidt  Admitted: 6/9/2023  Hospital Day: 3    Location: 8A-02/002-A Reason for admit: Closed fracture of anatomical neck of left humerus, initial encounter [S42.292A]  Closed fracture of anatomical neck of right humerus, initial encounter Chetan Meredith  Other closed displaced fracture of proximal end of right humerus, initial encounter [S42.291A]    Past Medical History:   Diagnosis Date    Chronic pancreatitis ([address]     Fibromyalgia     GERD (gastroesophageal reflux disease)     Hyperlipidemia     Hypertension     Low potassium syndrome     Osteoarthritis     Raynaud disease     [facility] ([facility] [address]        Procedure: 6/9 XR R Humerus: Displaced fracture of the humeral neck  6/9 XR L Humerus: Displaced fracture of the humeral neck. Age-indeterminate. 6/9 CT Head WO: Hematoma in the scalp overlying the right frontal calvarium. Atrophy. Diminished attenuation in the white matter most likely representing ischemic changes. Inflammatory changes in the left maxillary sinus, sphenoid sinuses bilaterally, left greater than right and in the left mastoid air cells  6/9 CT C Spine: No acute fracture of the cervical spine. 6/9 XR Pelvis: No acute pelvic fracture  6/9 CT Chest W: 1. No acute intrathoracic findings. 2. Small right lower lobe lung nodule, possibly a partially calcified granuloma. 3. Large hiatal hernia containing the majority of the stomach with organoaxial gastric volvulus. 4. Soft tissue injury of the left axilla and left chest wall. 5. Comminuted fracture of the proximal right humerus. 6/10 Silvio Dup BLE: No evidence of deep venous thrombosis in either lower extremity  6/10 MRI Brain WO: 1. No evidence of acute intracranial abnormality. 2. Mild to moderate severity chronic microvascular angiopathy superimposed on severe volume loss which has worsened in the interval since prior MRI.    3. Moderate mucosal inflammation of the paranasal

## 2023-06-13 ENCOUNTER — APPOINTMENT (OUTPATIENT)
Dept: GENERAL RADIOLOGY | Age: 88
DRG: 483 | End: 2023-06-13
Payer: MEDICARE

## 2023-06-13 LAB
ANION GAP SERPL CALC-SCNC: 13 MEQ/L (ref 8–16)
BUN SERPL-MCNC: 12 MG/DL (ref 7–22)
CALCIUM SERPL-MCNC: 8.2 MG/DL (ref 8.5–10.5)
CHLORIDE SERPL-SCNC: 111 MEQ/L (ref 98–111)
CO2 SERPL-SCNC: 17 MEQ/L (ref 23–33)
CREAT SERPL-MCNC: 0.9 MG/DL (ref 0.4–1.2)
DEPRECATED RDW RBC AUTO: 52.4 FL (ref 35–45)
ERYTHROCYTE [DISTWIDTH] IN BLOOD BY AUTOMATED COUNT: 15.1 % (ref 11.5–14.5)
GFR SERPL CREATININE-BSD FRML MDRD: 60 ML/MIN/1.73M2
GLUCOSE SERPL-MCNC: 97 MG/DL (ref 70–108)
HCT VFR BLD AUTO: 31.8 % (ref 37–47)
HGB BLD-MCNC: 9.9 GM/DL (ref 12–16)
LEVETIRACETAM SERPL-MCNC: 26 UG/ML (ref 10–40)
LV EF: 60 %
LVEF MODALITY: NORMAL
MAGNESIUM SERPL-MCNC: 1.7 MG/DL (ref 1.6–2.4)
MCH RBC QN AUTO: 29.8 PG (ref 26–33)
MCHC RBC AUTO-ENTMCNC: 31.1 GM/DL (ref 32.2–35.5)
MCV RBC AUTO: 95.8 FL (ref 81–99)
PLATELET # BLD AUTO: 256 THOU/MM3 (ref 130–400)
PMV BLD AUTO: 8.7 FL (ref 9.4–12.4)
POTASSIUM SERPL-SCNC: 3.5 MEQ/L (ref 3.5–5.2)
RBC # BLD AUTO: 3.32 MILL/MM3 (ref 4.2–5.4)
SODIUM SERPL-SCNC: 141 MEQ/L (ref 135–145)
WBC # BLD AUTO: 9.4 THOU/MM3 (ref 4.8–10.8)

## 2023-06-13 PROCEDURE — 6370000000 HC RX 637 (ALT 250 FOR IP): Performed by: NURSE PRACTITIONER

## 2023-06-13 PROCEDURE — 73030 X-RAY EXAM OF SHOULDER: CPT

## 2023-06-13 PROCEDURE — 3700000001 HC ADD 15 MINUTES (ANESTHESIA): Performed by: ORTHOPAEDIC SURGERY

## 2023-06-13 PROCEDURE — A4216 STERILE WATER/SALINE, 10 ML: HCPCS | Performed by: ORTHOPAEDIC SURGERY

## 2023-06-13 PROCEDURE — 93306 TTE W/DOPPLER COMPLETE: CPT

## 2023-06-13 PROCEDURE — 80048 BASIC METABOLIC PNL TOTAL CA: CPT

## 2023-06-13 PROCEDURE — 2580000003 HC RX 258: Performed by: PHYSICIAN ASSISTANT

## 2023-06-13 PROCEDURE — 3600000015 HC SURGERY LEVEL 5 ADDTL 15MIN: Performed by: ORTHOPAEDIC SURGERY

## 2023-06-13 PROCEDURE — 2720000010 HC SURG SUPPLY STERILE: Performed by: ORTHOPAEDIC SURGERY

## 2023-06-13 PROCEDURE — 85027 COMPLETE CBC AUTOMATED: CPT

## 2023-06-13 PROCEDURE — 2580000003 HC RX 258: Performed by: NURSE PRACTITIONER

## 2023-06-13 PROCEDURE — 7100000001 HC PACU RECOVERY - ADDTL 15 MIN: Performed by: ORTHOPAEDIC SURGERY

## 2023-06-13 PROCEDURE — 6360000002 HC RX W HCPCS: Performed by: NURSE PRACTITIONER

## 2023-06-13 PROCEDURE — 0RRK0J6 REPLACEMENT OF LEFT SHOULDER JOINT WITH SYNTHETIC SUBSTITUTE, HUMERAL SURFACE, OPEN APPROACH: ICD-10-PCS | Performed by: ORTHOPAEDIC SURGERY

## 2023-06-13 PROCEDURE — 2500000003 HC RX 250 WO HCPCS

## 2023-06-13 PROCEDURE — 6360000002 HC RX W HCPCS

## 2023-06-13 PROCEDURE — 36415 COLL VENOUS BLD VENIPUNCTURE: CPT

## 2023-06-13 PROCEDURE — C1713 ANCHOR/SCREW BN/BN,TIS/BN: HCPCS | Performed by: ORTHOPAEDIC SURGERY

## 2023-06-13 PROCEDURE — 7100000000 HC PACU RECOVERY - FIRST 15 MIN: Performed by: ORTHOPAEDIC SURGERY

## 2023-06-13 PROCEDURE — 83735 ASSAY OF MAGNESIUM: CPT

## 2023-06-13 PROCEDURE — 3700000000 HC ANESTHESIA ATTENDED CARE: Performed by: ORTHOPAEDIC SURGERY

## 2023-06-13 PROCEDURE — 6360000002 HC RX W HCPCS: Performed by: PHYSICIAN ASSISTANT

## 2023-06-13 PROCEDURE — 0RRJ0J6 REPLACEMENT OF RIGHT SHOULDER JOINT WITH SYNTHETIC SUBSTITUTE, HUMERAL SURFACE, OPEN APPROACH: ICD-10-PCS | Performed by: ORTHOPAEDIC SURGERY

## 2023-06-13 PROCEDURE — 1200000000 HC SEMI PRIVATE

## 2023-06-13 PROCEDURE — 2500000003 HC RX 250 WO HCPCS: Performed by: ORTHOPAEDIC SURGERY

## 2023-06-13 PROCEDURE — 2709999900 HC NON-CHARGEABLE SUPPLY: Performed by: ORTHOPAEDIC SURGERY

## 2023-06-13 PROCEDURE — 2580000003 HC RX 258: Performed by: ORTHOPAEDIC SURGERY

## 2023-06-13 PROCEDURE — 6360000002 HC RX W HCPCS: Performed by: ORTHOPAEDIC SURGERY

## 2023-06-13 PROCEDURE — C1776 JOINT DEVICE (IMPLANTABLE): HCPCS | Performed by: ORTHOPAEDIC SURGERY

## 2023-06-13 PROCEDURE — 3600000005 HC SURGERY LEVEL 5 BASE: Performed by: ORTHOPAEDIC SURGERY

## 2023-06-13 DEVICE — RALLY MV AB BONE CEMENT 40 GRAMS
Type: IMPLANTABLE DEVICE | Site: SHOULDER | Status: FUNCTIONAL
Brand: RALLY

## 2023-06-13 DEVICE — IMPLANTABLE DEVICE: Type: IMPLANTABLE DEVICE | Site: SHOULDER | Status: FUNCTIONAL

## 2023-06-13 RX ORDER — LABETALOL HYDROCHLORIDE 5 MG/ML
10 INJECTION, SOLUTION INTRAVENOUS
Status: DISCONTINUED | OUTPATIENT
Start: 2023-06-13 | End: 2023-06-15 | Stop reason: ALTCHOICE

## 2023-06-13 RX ORDER — FENTANYL CITRATE 50 UG/ML
25 INJECTION, SOLUTION INTRAMUSCULAR; INTRAVENOUS EVERY 5 MIN PRN
Status: DISCONTINUED | OUTPATIENT
Start: 2023-06-13 | End: 2023-06-15 | Stop reason: ALTCHOICE

## 2023-06-13 RX ORDER — DROPERIDOL 2.5 MG/ML
INJECTION, SOLUTION INTRAMUSCULAR; INTRAVENOUS
Status: COMPLETED
Start: 2023-06-13 | End: 2023-06-13

## 2023-06-13 RX ORDER — FENTANYL CITRATE 50 UG/ML
INJECTION, SOLUTION INTRAMUSCULAR; INTRAVENOUS
Status: COMPLETED
Start: 2023-06-13 | End: 2023-06-13

## 2023-06-13 RX ORDER — TRANEXAMIC ACID 100 MG/ML
INJECTION, SOLUTION INTRAVENOUS PRN
Status: DISCONTINUED | OUTPATIENT
Start: 2023-06-13 | End: 2023-06-13 | Stop reason: ALTCHOICE

## 2023-06-13 RX ORDER — LABETALOL HYDROCHLORIDE 5 MG/ML
INJECTION, SOLUTION INTRAVENOUS
Status: COMPLETED
Start: 2023-06-13 | End: 2023-06-13

## 2023-06-13 RX ORDER — FENTANYL CITRATE 50 UG/ML
50 INJECTION, SOLUTION INTRAMUSCULAR; INTRAVENOUS EVERY 5 MIN PRN
Status: DISCONTINUED | OUTPATIENT
Start: 2023-06-13 | End: 2023-06-15 | Stop reason: ALTCHOICE

## 2023-06-13 RX ORDER — DROPERIDOL 2.5 MG/ML
0.62 INJECTION, SOLUTION INTRAMUSCULAR; INTRAVENOUS ONCE
Status: COMPLETED | OUTPATIENT
Start: 2023-06-13 | End: 2023-06-13

## 2023-06-13 RX ADMIN — MORPHINE SULFATE 4 MG: 4 INJECTION, SOLUTION INTRAMUSCULAR; INTRAVENOUS at 10:05

## 2023-06-13 RX ADMIN — ACETAMINOPHEN 650 MG: 325 TABLET ORAL at 20:39

## 2023-06-13 RX ADMIN — Medication 2000 MG: at 20:47

## 2023-06-13 RX ADMIN — MIRTAZAPINE 7.5 MG: 7.5 TABLET ORAL at 20:39

## 2023-06-13 RX ADMIN — MORPHINE SULFATE 4 MG: 4 INJECTION, SOLUTION INTRAMUSCULAR; INTRAVENOUS at 01:26

## 2023-06-13 RX ADMIN — Medication 4.5 MG: at 20:39

## 2023-06-13 RX ADMIN — DROPERIDOL 0.62 MG: 2.5 INJECTION, SOLUTION INTRAMUSCULAR; INTRAVENOUS at 13:59

## 2023-06-13 RX ADMIN — SODIUM CHLORIDE, PRESERVATIVE FREE 10 ML: 5 INJECTION INTRAVENOUS at 10:10

## 2023-06-13 RX ADMIN — SODIUM CHLORIDE: 9 INJECTION, SOLUTION INTRAVENOUS at 17:15

## 2023-06-13 RX ADMIN — Medication 10 MG: at 14:33

## 2023-06-13 RX ADMIN — LABETALOL HYDROCHLORIDE 10 MG: 5 INJECTION, SOLUTION INTRAVENOUS at 14:33

## 2023-06-13 RX ADMIN — MORPHINE SULFATE 4 MG: 4 INJECTION, SOLUTION INTRAMUSCULAR; INTRAVENOUS at 05:39

## 2023-06-13 RX ADMIN — FENTANYL CITRATE 50 MCG: 0.05 INJECTION, SOLUTION INTRAMUSCULAR; INTRAVENOUS at 14:15

## 2023-06-13 RX ADMIN — FAMOTIDINE 20 MG: 20 TABLET ORAL at 20:39

## 2023-06-13 RX ADMIN — FENTANYL CITRATE 50 MCG: 50 INJECTION, SOLUTION INTRAMUSCULAR; INTRAVENOUS at 14:15

## 2023-06-13 RX ADMIN — SODIUM BICARBONATE 650 MG: 650 TABLET ORAL at 20:39

## 2023-06-13 RX ADMIN — LEVETIRACETAM 250 MG: 500 TABLET, FILM COATED ORAL at 20:39

## 2023-06-13 RX ADMIN — METOPROLOL TARTRATE 25 MG: 25 TABLET, FILM COATED ORAL at 20:39

## 2023-06-13 RX ADMIN — ATORVASTATIN CALCIUM 40 MG: 40 TABLET, FILM COATED ORAL at 20:39

## 2023-06-13 RX ADMIN — MORPHINE SULFATE 4 MG: 4 INJECTION, SOLUTION INTRAMUSCULAR; INTRAVENOUS at 21:22

## 2023-06-13 ASSESSMENT — PAIN SCALES - GENERAL
PAINLEVEL_OUTOF10: 8
PAINLEVEL_OUTOF10: 7
PAINLEVEL_OUTOF10: 3
PAINLEVEL_OUTOF10: 8
PAINLEVEL_OUTOF10: 9

## 2023-06-13 ASSESSMENT — PAIN DESCRIPTION - LOCATION
LOCATION: ARM
LOCATION: ARM
LOCATION: SHOULDER

## 2023-06-13 ASSESSMENT — PAIN DESCRIPTION - DESCRIPTORS
DESCRIPTORS: PATIENT UNABLE TO DESCRIBE
DESCRIPTORS: OTHER (COMMENT)
DESCRIPTORS: PATIENT UNABLE TO DESCRIBE
DESCRIPTORS: ACHING;DISCOMFORT
DESCRIPTORS: ACHING;CRAMPING;DISCOMFORT

## 2023-06-13 ASSESSMENT — PAIN DESCRIPTION - ORIENTATION
ORIENTATION: RIGHT;LEFT

## 2023-06-13 ASSESSMENT — PAIN SCALES - WONG BAKER: WONGBAKER_NUMERICALRESPONSE: 6

## 2023-06-13 ASSESSMENT — PAIN - FUNCTIONAL ASSESSMENT
PAIN_FUNCTIONAL_ASSESSMENT: PREVENTS OR INTERFERES WITH MANY ACTIVE NOT PASSIVE ACTIVITIES
PAIN_FUNCTIONAL_ASSESSMENT: PREVENTS OR INTERFERES WITH ALL ACTIVE AND SOME PASSIVE ACTIVITIES

## 2023-06-13 NOTE — OP NOTE
06/12/23 [place] (Active)   $ Urethral catheter insertion Inserted for procedure 06/12/23 1408   Catheter Indications Perioperative use for selected surgical procedures;Prolonged immobilization (e.g. unstable thoracic or lumbar spine, multiple traumatic injuries such as pelvic fractures) 06/13/23 0947   Site Assessment Pink 06/13/23 0947   Urine Color Yellow 06/13/23 0947   Urine Appearance Clear 06/13/23 0947   Urine Odor Malodorous 06/12/23 2221   Collection Container Standard 06/13/23 0947   Securement Method Securing device (Describe) 06/13/23 0947   Catheter Care  Perineal wipes 06/12/23 2221   Catheter Best Practices  Drainage bag less than half full 06/13/23 0947   Status Draining 06/13/23 0947   Output (mL) 1000 mL 06/12/23 2221       Findings: Confirmed        Detailed Description of Procedure: Indications  This is a 80-year-old female history of fall. Sustained bilateral proximal humerus fractures. Multiple medical morbidities. Family elected to proceed with surgical intervention for pain control. Felt her best option would be a hemiarthroplasty's. Narrative  Patient taken operating room underwent a general anesthetic. Bilateral shoulders were prepped and draped normal sterile fashion. Timeout was taken consent was confirmed. He received 2 g of Ancef. Started with the left side first.  Skin knife electrocautery doing a direct lateral approach. Down to the deltoid take small sleeve off of the acromion. Split the deltoid. Retractors put in position. Removed the head. There is still some cuff. Other bone debris was removed. Like with a size 38 head. Reamed like to go with a size 8 cemented stem. We trialed and felt to be appropriate. We then made an approach on the right side. Skin knife electrocautery taking a sleeve off the acromion. Split the deltoid. Removed the remaining head and neck. Like to go the same size as is the left side. We then cemented bilaterally at the same time.

## 2023-06-13 NOTE — DISCHARGE INSTRUCTIONS
Dona Farmer MD       ACTIVITY / WEIGHTBEARING  INSTRUCTIONS:  Limited Weightbearing as tolerated surgical extremities. PT/OT     DIET:  Increase Fluid/Water intake, eat foods high in fiber, fruits and vegetables to help to prevent constipation. WOUND/DRESSING INSTRUCTIONS:  Always ensure you wash your hands before and after caring for your wound/dressing. Keep your dressing clean and dry. Change dressing as needed. Can wash around incision. Do not place antibiotic ointment, lotion, creams on surgical incision. Smoking impairs adequate wound healing. If smoking, consider quitting. May apply ice to surgical incision to help to prevent swelling. MEDICATION INSTRUCTIONS:  Take pain medication as prescribed. See orders regarding resuming your home medications and any new medications. Continue blood thinner if ordered by your physician. FOLLOW-UP CARE:  If a follow-up appointment was not made for you at discharge, call [phone] [facility] office) or [phone] [facility] office) to schedule an appointment for 3 weeks. Call Dr Bettyjo Blizzard office with any concerns. Signs of infection such as fever, chills, redness, pus, or bad smelling discharge from incision site. Excessive bleeding, swelling, increasing pain, or discharge around incision site. The stitches or staples come apart at the incision site. Cough shortness of breath, or chest pain. Severe nausea or vomiting. Pain that you cannot control with the medications you have been given or  pain becomes worse. Numbness, tingling, or loss of feeling in your leg, knee, or foot. Pain, burning, urgency, or frequency of urination. If a medical emergerncy, please call [phone] or go to your local emergency room.

## 2023-06-13 NOTE — CARE COORDINATION
6/13/23, 2:47 PM EDT    DISCHARGE ON GOING [email or web address] day: 4  Location: [facility] OR (General) POOL R* Reason for admit: Closed fracture of anatomical neck of left humerus, initial encounter [S42.292A]  Closed fracture of anatomical neck of right humerus, initial encounter Hubert Reza  Other closed displaced fracture of proximal end of right humerus, initial encounter [S42.291X]   Procedure: 6/9 XR R Humerus: Displaced fracture of the humeral neck  6/9 XR L Humerus: Displaced fracture of the humeral neck. Age-indeterminate. 6/9 CT Head WO: Hematoma in the scalp overlying the right frontal calvarium. Atrophy. Diminished attenuation in the white matter most likely representing ischemic changes. Inflammatory changes in the left maxillary sinus, sphenoid sinuses bilaterally, left greater than right and in the left mastoid air cells  6/9 CT C Spine: No acute fracture of the cervical spine. 6/9 XR Pelvis: No acute pelvic fracture  6/9 CT Chest W: No acute intrathoracic findings. Small right lower lobe lung nodule, possibly a partially calcified granuloma. Large hiatal hernia containing the majority of the stomach with organoaxial gastric volvulus. Soft tissue injury of the left axilla and left chest wall. Comminuted fracture of the proximal right humerus. 6/10 Silvio Dup BLE: No evidence of deep venous thrombosis in either lower extremity  6/10 MRI Brain WO: No evidence of acute intracranial abnormality. Mild to moderate severity chronic microvascular angiopathy superimposed on severe volume loss which has worsened in the interval since prior MRI. Moderate mucosal inflammation of the paranasal sinuses. Moderate to large mastoid effusions. 6/13 Open treatment right proximal humerus fracture with a prosthesis, open treatment left proximal humerus fracture with a prosthesis  Barriers to Discharge: Orthopedic surgery and Hospitalist following. Continue therapy. To OR today, . IVF.  Pain

## 2023-06-14 LAB
ANION GAP SERPL CALC-SCNC: 17 MEQ/L (ref 8–16)
BUN SERPL-MCNC: 19 MG/DL (ref 7–22)
CALCIUM SERPL-MCNC: 7.7 MG/DL (ref 8.5–10.5)
CHLORIDE SERPL-SCNC: 111 MEQ/L (ref 98–111)
CO2 SERPL-SCNC: 14 MEQ/L (ref 23–33)
CREAT SERPL-MCNC: 1.1 MG/DL (ref 0.4–1.2)
DEPRECATED RDW RBC AUTO: 52.2 FL (ref 35–45)
ERYTHROCYTE [DISTWIDTH] IN BLOOD BY AUTOMATED COUNT: 15 % (ref 11.5–14.5)
GFR SERPL CREATININE-BSD FRML MDRD: 47 ML/MIN/1.73M2
GLUCOSE SERPL-MCNC: 136 MG/DL (ref 70–108)
HCT VFR BLD AUTO: 27.5 % (ref 37–47)
HGB BLD-MCNC: 8.5 GM/DL (ref 12–16)
MCH RBC QN AUTO: 29.5 PG (ref 26–33)
MCHC RBC AUTO-ENTMCNC: 30.9 GM/DL (ref 32.2–35.5)
MCV RBC AUTO: 95.5 FL (ref 81–99)
PLATELET # BLD AUTO: 252 THOU/MM3 (ref 130–400)
PMV BLD AUTO: 8.7 FL (ref 9.4–12.4)
POTASSIUM SERPL-SCNC: 4.3 MEQ/L (ref 3.5–5.2)
RBC # BLD AUTO: 2.88 MILL/MM3 (ref 4.2–5.4)
SODIUM SERPL-SCNC: 142 MEQ/L (ref 135–145)
WBC # BLD AUTO: 9 THOU/MM3 (ref 4.8–10.8)

## 2023-06-14 PROCEDURE — 6360000002 HC RX W HCPCS: Performed by: NURSE PRACTITIONER

## 2023-06-14 PROCEDURE — 36415 COLL VENOUS BLD VENIPUNCTURE: CPT

## 2023-06-14 PROCEDURE — 6370000000 HC RX 637 (ALT 250 FOR IP): Performed by: NURSE PRACTITIONER

## 2023-06-14 PROCEDURE — 1200000000 HC SEMI PRIVATE

## 2023-06-14 PROCEDURE — 99233 SBSQ HOSP IP/OBS HIGH 50: CPT | Performed by: PHYSICIAN ASSISTANT

## 2023-06-14 PROCEDURE — 97166 OT EVAL MOD COMPLEX 45 MIN: CPT

## 2023-06-14 PROCEDURE — 80048 BASIC METABOLIC PNL TOTAL CA: CPT

## 2023-06-14 PROCEDURE — 97530 THERAPEUTIC ACTIVITIES: CPT

## 2023-06-14 PROCEDURE — 85027 COMPLETE CBC AUTOMATED: CPT

## 2023-06-14 PROCEDURE — 97535 SELF CARE MNGMENT TRAINING: CPT

## 2023-06-14 PROCEDURE — 6370000000 HC RX 637 (ALT 250 FOR IP): Performed by: PHYSICIAN ASSISTANT

## 2023-06-14 PROCEDURE — 2580000003 HC RX 258: Performed by: NURSE PRACTITIONER

## 2023-06-14 PROCEDURE — 97162 PT EVAL MOD COMPLEX 30 MIN: CPT

## 2023-06-14 RX ORDER — BUMETANIDE 1 MG/1
1 TABLET ORAL ONCE
Status: COMPLETED | OUTPATIENT
Start: 2023-06-14 | End: 2023-06-14

## 2023-06-14 RX ADMIN — LEVETIRACETAM 250 MG: 500 TABLET, FILM COATED ORAL at 20:30

## 2023-06-14 RX ADMIN — ACETAMINOPHEN 650 MG: 325 TABLET ORAL at 10:26

## 2023-06-14 RX ADMIN — Medication 4.5 MG: at 20:29

## 2023-06-14 RX ADMIN — Medication 2000 MG: at 02:43

## 2023-06-14 RX ADMIN — METOPROLOL TARTRATE 25 MG: 25 TABLET, FILM COATED ORAL at 10:25

## 2023-06-14 RX ADMIN — SODIUM BICARBONATE 650 MG: 650 TABLET ORAL at 20:29

## 2023-06-14 RX ADMIN — ACETAMINOPHEN 650 MG: 325 TABLET ORAL at 20:38

## 2023-06-14 RX ADMIN — MIRTAZAPINE 7.5 MG: 7.5 TABLET ORAL at 20:29

## 2023-06-14 RX ADMIN — METOPROLOL TARTRATE 25 MG: 25 TABLET, FILM COATED ORAL at 20:29

## 2023-06-14 RX ADMIN — MORPHINE SULFATE 4 MG: 4 INJECTION, SOLUTION INTRAMUSCULAR; INTRAVENOUS at 05:26

## 2023-06-14 RX ADMIN — SODIUM BICARBONATE 650 MG: 650 TABLET ORAL at 15:28

## 2023-06-14 RX ADMIN — FAMOTIDINE 20 MG: 20 TABLET ORAL at 20:30

## 2023-06-14 RX ADMIN — SODIUM BICARBONATE 650 MG: 650 TABLET ORAL at 10:26

## 2023-06-14 RX ADMIN — Medication 1 TABLET: at 10:26

## 2023-06-14 RX ADMIN — BUMETANIDE 1 MG: 1 TABLET ORAL at 15:28

## 2023-06-14 RX ADMIN — ACETAMINOPHEN 650 MG: 325 TABLET ORAL at 15:28

## 2023-06-14 RX ADMIN — SODIUM CHLORIDE: 9 INJECTION, SOLUTION INTRAVENOUS at 02:43

## 2023-06-14 RX ADMIN — ATORVASTATIN CALCIUM 40 MG: 40 TABLET, FILM COATED ORAL at 20:30

## 2023-06-14 RX ADMIN — BISACODYL 5 MG: 5 TABLET, COATED ORAL at 10:26

## 2023-06-14 RX ADMIN — AMLODIPINE BESYLATE 2.5 MG: 2.5 TABLET ORAL at 10:26

## 2023-06-14 RX ADMIN — PANTOPRAZOLE SODIUM 40 MG: 40 TABLET, DELAYED RELEASE ORAL at 04:13

## 2023-06-14 RX ADMIN — OXYCODONE 10 MG: 5 TABLET ORAL at 12:38

## 2023-06-14 RX ADMIN — Medication 1000 UNITS: at 10:26

## 2023-06-14 RX ADMIN — OXYCODONE 10 MG: 5 TABLET ORAL at 17:14

## 2023-06-14 RX ADMIN — LEVETIRACETAM 500 MG: 500 TABLET, FILM COATED ORAL at 10:25

## 2023-06-14 RX ADMIN — BUDESONIDE 6 MG: 3 CAPSULE, GELATIN COATED ORAL at 10:25

## 2023-06-14 RX ADMIN — MORPHINE SULFATE 4 MG: 4 INJECTION, SOLUTION INTRAMUSCULAR; INTRAVENOUS at 02:31

## 2023-06-14 ASSESSMENT — PAIN DESCRIPTION - ORIENTATION
ORIENTATION: LEFT;RIGHT
ORIENTATION: RIGHT;LEFT

## 2023-06-14 ASSESSMENT — PAIN SCALES - GENERAL
PAINLEVEL_OUTOF10: 4
PAINLEVEL_OUTOF10: 6
PAINLEVEL_OUTOF10: 10
PAINLEVEL_OUTOF10: 10
PAINLEVEL_OUTOF10: 7
PAINLEVEL_OUTOF10: 7

## 2023-06-14 ASSESSMENT — PAIN DESCRIPTION - LOCATION
LOCATION: BACK;NECK
LOCATION: SHOULDER
LOCATION: ARM

## 2023-06-14 NOTE — DISCHARGE INSTR - COC
Continuity of Care Form    Patient Name: Gilmar Escobar   :  1931  MRN:  [id number]    Admit date:  2023  Discharge date:  23    Code Status Order: Limited   Advance Directives:     Admitting Physician:  Cassidy Sanon MD  PCP: Marbella Ortega DO    Discharging Nurse: [facility] Unit/Room#: 8A-02/002-A  Discharging Unit Phone Number: [phone]    Emergency Contact:   Extended Emergency Contact Information  Primary Emergency Contact: Steve Ty And Alisa Ugalde  Address:     [address]           [place] Phone: [phone]  Mobile Phone: [phone]  Relation: Child    Past Surgical History:  Past Surgical History:   Procedure Laterality Date    APPENDECTOMY      CHOLECYSTECTOMY      COLONOSCOPY      EYE SURGERY      HERNIA REPAIR      HIP SURGERY Right 2021    RIGHT INTERTAN performed by Cassidy Sanon MD at [address] [place] [address] ([facility])      UPPER GASTROINTESTINAL ENDOSCOPY         Immunization History:   Immunization History   Administered Date(s) Administered    COVID-19, J&J, (age 18y+), IM, 0.5 mL 2021    Influenza Vaccine, unspecified formulation 2016    Influenza, High Dose (Fluzone 65 yrs and older) 10/19/2017       Active Problems:  Patient Active Problem List   Diagnosis Code    Partial seizure ([facility] [address] R56.9    CKD (chronic kidney disease) stage 3, GFR 30-59 ml/min ([facility]) N18.30    Benign essential HTN I10    Chronic pancreatitis ([facility] [address] K86.1    Lesion of right native kidney C05.6    Metabolic acidosis Y85.40    Chronic diarrhea of unknown origin K52.9    Severe protein-calorie malnutrition Tere Acharya: less than 60% of standard weight) ([address] E43    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 XXXA    Impaired glucose

## 2023-06-15 ENCOUNTER — TELEPHONE (OUTPATIENT)
Dept: CARDIOLOGY CLINIC | Age: 88
End: 2023-06-15

## 2023-06-15 LAB
ALKALINE PHOSPHATASE ISOENZYMES: NORMAL
ANION GAP SERPL CALC-SCNC: 20 MEQ/L (ref 8–16)
BUN SERPL-MCNC: 19 MG/DL (ref 7–22)
CALCIUM SERPL-MCNC: 8.3 MG/DL (ref 8.5–10.5)
CHLORIDE SERPL-SCNC: 102 MEQ/L (ref 98–111)
CO2 SERPL-SCNC: 17 MEQ/L (ref 23–33)
CREAT SERPL-MCNC: 0.9 MG/DL (ref 0.4–1.2)
DEPRECATED RDW RBC AUTO: 54.8 FL (ref 35–45)
ERYTHROCYTE [DISTWIDTH] IN BLOOD BY AUTOMATED COUNT: 15.2 % (ref 11.5–14.5)
GFR SERPL CREATININE-BSD FRML MDRD: 60 ML/MIN/1.73M2
GLUCOSE SERPL-MCNC: 109 MG/DL (ref 70–108)
HCT VFR BLD AUTO: 30.6 % (ref 37–47)
HGB BLD-MCNC: 9 GM/DL (ref 12–16)
MCH RBC QN AUTO: 29.3 PG (ref 26–33)
MCHC RBC AUTO-ENTMCNC: 29.4 GM/DL (ref 32.2–35.5)
MCV RBC AUTO: 99.7 FL (ref 81–99)
PLATELET # BLD AUTO: 284 THOU/MM3 (ref 130–400)
PMV BLD AUTO: 8.4 FL (ref 9.4–12.4)
POTASSIUM SERPL-SCNC: 3.1 MEQ/L (ref 3.5–5.2)
RBC # BLD AUTO: 3.07 MILL/MM3 (ref 4.2–5.4)
REASON FOR REJECTION: NORMAL
REJECTED TEST: NORMAL
SODIUM SERPL-SCNC: 139 MEQ/L (ref 135–145)
WBC # BLD AUTO: 11.3 THOU/MM3 (ref 4.8–10.8)

## 2023-06-15 PROCEDURE — 97530 THERAPEUTIC ACTIVITIES: CPT

## 2023-06-15 PROCEDURE — 6370000000 HC RX 637 (ALT 250 FOR IP): Performed by: NURSE PRACTITIONER

## 2023-06-15 PROCEDURE — 1200000000 HC SEMI PRIVATE

## 2023-06-15 PROCEDURE — 97110 THERAPEUTIC EXERCISES: CPT

## 2023-06-15 PROCEDURE — 80048 BASIC METABOLIC PNL TOTAL CA: CPT

## 2023-06-15 PROCEDURE — 85027 COMPLETE CBC AUTOMATED: CPT

## 2023-06-15 PROCEDURE — 36415 COLL VENOUS BLD VENIPUNCTURE: CPT

## 2023-06-15 PROCEDURE — 6370000000 HC RX 637 (ALT 250 FOR IP): Performed by: PHYSICIAN ASSISTANT

## 2023-06-15 PROCEDURE — 99232 SBSQ HOSP IP/OBS MODERATE 35: CPT | Performed by: PHYSICIAN ASSISTANT

## 2023-06-15 PROCEDURE — 97535 SELF CARE MNGMENT TRAINING: CPT

## 2023-06-15 RX ORDER — BUMETANIDE 1 MG/1
1 TABLET ORAL ONCE
Status: COMPLETED | OUTPATIENT
Start: 2023-06-15 | End: 2023-06-15

## 2023-06-15 RX ADMIN — ACETAMINOPHEN 650 MG: 325 TABLET ORAL at 20:50

## 2023-06-15 RX ADMIN — ACETAMINOPHEN 650 MG: 325 TABLET ORAL at 13:34

## 2023-06-15 RX ADMIN — SODIUM BICARBONATE 650 MG: 650 TABLET ORAL at 20:50

## 2023-06-15 RX ADMIN — LEVETIRACETAM 250 MG: 500 TABLET, FILM COATED ORAL at 20:50

## 2023-06-15 RX ADMIN — MIRTAZAPINE 7.5 MG: 7.5 TABLET ORAL at 20:50

## 2023-06-15 RX ADMIN — ATORVASTATIN CALCIUM 40 MG: 40 TABLET, FILM COATED ORAL at 20:50

## 2023-06-15 RX ADMIN — Medication 1000 UNITS: at 09:18

## 2023-06-15 RX ADMIN — OXYCODONE 5 MG: 5 TABLET ORAL at 00:50

## 2023-06-15 RX ADMIN — BUDESONIDE 6 MG: 3 CAPSULE, GELATIN COATED ORAL at 09:21

## 2023-06-15 RX ADMIN — ACETAMINOPHEN 650 MG: 325 TABLET ORAL at 09:13

## 2023-06-15 RX ADMIN — SODIUM BICARBONATE 650 MG: 650 TABLET ORAL at 13:34

## 2023-06-15 RX ADMIN — FAMOTIDINE 20 MG: 20 TABLET ORAL at 20:49

## 2023-06-15 RX ADMIN — Medication 1 TABLET: at 09:18

## 2023-06-15 RX ADMIN — BUMETANIDE 1 MG: 1 TABLET ORAL at 16:12

## 2023-06-15 RX ADMIN — SODIUM BICARBONATE 650 MG: 650 TABLET ORAL at 09:18

## 2023-06-15 RX ADMIN — Medication 4.5 MG: at 20:49

## 2023-06-15 RX ADMIN — SODIUM BICARBONATE 650 MG: 650 TABLET ORAL at 16:12

## 2023-06-15 RX ADMIN — AMLODIPINE BESYLATE 2.5 MG: 2.5 TABLET ORAL at 13:35

## 2023-06-15 RX ADMIN — LEVETIRACETAM 500 MG: 500 TABLET, FILM COATED ORAL at 09:18

## 2023-06-15 RX ADMIN — METOPROLOL TARTRATE 25 MG: 25 TABLET, FILM COATED ORAL at 20:50

## 2023-06-15 RX ADMIN — METOPROLOL TARTRATE 25 MG: 25 TABLET, FILM COATED ORAL at 09:18

## 2023-06-15 ASSESSMENT — PAIN SCALES - GENERAL
PAINLEVEL_OUTOF10: 0
PAINLEVEL_OUTOF10: 8

## 2023-06-15 NOTE — CARE COORDINATION
6/15/23, 2:15 PM EDT    DISCHARGE PLANNING EVALUATION     Left a message for Nayan Gomez at [facility] that plan for discharge back to the facility tomorrow.

## 2023-06-16 VITALS
SYSTOLIC BLOOD PRESSURE: 155 MMHG | TEMPERATURE: 98.1 F | OXYGEN SATURATION: 99 % | RESPIRATION RATE: 16 BRPM | DIASTOLIC BLOOD PRESSURE: 67 MMHG | HEART RATE: 74 BPM | WEIGHT: 134.92 LBS | BODY MASS INDEX: 29.11 KG/M2 | HEIGHT: 57 IN

## 2023-06-16 LAB
BASOPHILS ABSOLUTE: 0.1 THOU/MM3 (ref 0–0.1)
BASOPHILS NFR BLD AUTO: 0.6 %
CA-I BLD ISE-SCNC: 0.95 MMOL/L (ref 1.12–1.32)
DEPRECATED RDW RBC AUTO: 51.2 FL (ref 35–45)
EOSINOPHIL NFR BLD AUTO: 2.4 %
EOSINOPHILS ABSOLUTE: 0.2 THOU/MM3 (ref 0–0.4)
ERYTHROCYTE [DISTWIDTH] IN BLOOD BY AUTOMATED COUNT: 15 % (ref 11.5–14.5)
HCT VFR BLD AUTO: 28 % (ref 37–47)
HGB BLD-MCNC: 8.8 GM/DL (ref 12–16)
IMM GRANULOCYTES # BLD AUTO: 0.18 THOU/MM3 (ref 0–0.07)
IMM GRANULOCYTES NFR BLD AUTO: 1.7 %
LYMPHOCYTES ABSOLUTE: 1.3 THOU/MM3 (ref 1–4.8)
LYMPHOCYTES NFR BLD AUTO: 12.5 %
MCH RBC QN AUTO: 29.7 PG (ref 26–33)
MCHC RBC AUTO-ENTMCNC: 31.4 GM/DL (ref 32.2–35.5)
MCV RBC AUTO: 94.6 FL (ref 81–99)
MONOCYTES ABSOLUTE: 0.6 THOU/MM3 (ref 0.4–1.3)
MONOCYTES NFR BLD AUTO: 6.1 %
NEUTROPHILS NFR BLD AUTO: 76.7 %
NRBC BLD AUTO-RTO: 0 /100 WBC
PLATELET # BLD AUTO: 260 THOU/MM3 (ref 130–400)
PMV BLD AUTO: 8.7 FL (ref 9.4–12.4)
RBC # BLD AUTO: 2.96 MILL/MM3 (ref 4.2–5.4)
SEGMENTED NEUTROPHILS ABSOLUTE COUNT: 7.9 THOU/MM3 (ref 1.8–7.7)
WBC # BLD AUTO: 10.3 THOU/MM3 (ref 4.8–10.8)

## 2023-06-16 PROCEDURE — 82330 ASSAY OF CALCIUM: CPT

## 2023-06-16 PROCEDURE — 2580000003 HC RX 258: Performed by: PHYSICIAN ASSISTANT

## 2023-06-16 PROCEDURE — 6360000002 HC RX W HCPCS: Performed by: PHYSICIAN ASSISTANT

## 2023-06-16 PROCEDURE — 85025 COMPLETE CBC W/AUTO DIFF WBC: CPT

## 2023-06-16 PROCEDURE — 6370000000 HC RX 637 (ALT 250 FOR IP): Performed by: NURSE PRACTITIONER

## 2023-06-16 PROCEDURE — 36415 COLL VENOUS BLD VENIPUNCTURE: CPT

## 2023-06-16 PROCEDURE — 51798 US URINE CAPACITY MEASURE: CPT

## 2023-06-16 PROCEDURE — 92526 ORAL FUNCTION THERAPY: CPT

## 2023-06-16 RX ORDER — CALCIUM CARBONATE 500 MG/1
2 TABLET, CHEWABLE ORAL DAILY
Qty: 45 TABLET | Refills: 0 | DISCHARGE
Start: 2023-06-16 | End: 2023-07-16

## 2023-06-16 RX ORDER — CALCIUM CARBONATE 500 MG/1
750 TABLET, CHEWABLE ORAL 2 TIMES DAILY
Status: DISCONTINUED | OUTPATIENT
Start: 2023-06-16 | End: 2023-06-16 | Stop reason: HOSPADM

## 2023-06-16 RX ORDER — FAMOTIDINE 20 MG/1
20 TABLET, FILM COATED ORAL NIGHTLY
Qty: 60 TABLET | Refills: 3
Start: 2023-06-16

## 2023-06-16 RX ORDER — TRAMADOL HYDROCHLORIDE 50 MG/1
50 TABLET ORAL EVERY 8 HOURS PRN
Qty: 21 TABLET | Refills: 0 | Status: SHIPPED | OUTPATIENT
Start: 2023-06-16 | End: 2023-06-23

## 2023-06-16 RX ORDER — MIRTAZAPINE 7.5 MG/1
7.5 TABLET, FILM COATED ORAL NIGHTLY
Qty: 30 TABLET | Refills: 3
Start: 2023-06-16

## 2023-06-16 RX ADMIN — OXYCODONE 10 MG: 5 TABLET ORAL at 17:59

## 2023-06-16 RX ADMIN — ACETAMINOPHEN 650 MG: 325 TABLET ORAL at 04:00

## 2023-06-16 RX ADMIN — BUDESONIDE 6 MG: 3 CAPSULE, GELATIN COATED ORAL at 09:31

## 2023-06-16 RX ADMIN — PANTOPRAZOLE SODIUM 40 MG: 40 TABLET, DELAYED RELEASE ORAL at 05:34

## 2023-06-16 RX ADMIN — Medication 1 TABLET: at 09:31

## 2023-06-16 RX ADMIN — METOPROLOL TARTRATE 25 MG: 25 TABLET, FILM COATED ORAL at 09:31

## 2023-06-16 RX ADMIN — AMLODIPINE BESYLATE 2.5 MG: 2.5 TABLET ORAL at 13:55

## 2023-06-16 RX ADMIN — OXYCODONE 5 MG: 5 TABLET ORAL at 09:28

## 2023-06-16 RX ADMIN — SODIUM BICARBONATE 650 MG: 650 TABLET ORAL at 13:55

## 2023-06-16 RX ADMIN — ACETAMINOPHEN 650 MG: 325 TABLET ORAL at 09:28

## 2023-06-16 RX ADMIN — CALCIUM GLUCONATE 4000 MG: 98 INJECTION, SOLUTION INTRAVENOUS at 15:01

## 2023-06-16 RX ADMIN — SODIUM BICARBONATE 650 MG: 650 TABLET ORAL at 09:31

## 2023-06-16 RX ADMIN — Medication 1000 UNITS: at 09:31

## 2023-06-16 RX ADMIN — LEVETIRACETAM 500 MG: 500 TABLET, FILM COATED ORAL at 09:31

## 2023-06-16 RX ADMIN — ACETAMINOPHEN 650 MG: 325 TABLET ORAL at 13:55

## 2023-06-16 ASSESSMENT — PAIN DESCRIPTION - LOCATION: LOCATION: ARM

## 2023-06-16 ASSESSMENT — PAIN SCALES - GENERAL
PAINLEVEL_OUTOF10: 8
PAINLEVEL_OUTOF10: 2

## 2023-06-16 ASSESSMENT — PAIN DESCRIPTION - DESCRIPTORS: DESCRIPTORS: ACHING

## 2023-06-16 ASSESSMENT — PAIN DESCRIPTION - ORIENTATION: ORIENTATION: RIGHT;LEFT

## 2023-06-16 NOTE — FLOWSHEET NOTE
Jerald Barrios 60  PHYSICAL THERAPY MISSED TREATMENT NOTE  STR MED SURG 8A    Date: 2023  Patient Name: Yumi Castro        MRN: [id number]   : 1931  (80 y.o.)  Gender: female   Referring Practitioner: ERNA JACOBS CNP  Diagnosis: closed fractures of anatomical neck of shannan humerus         REASON FOR MISSED TREATMENT:  Hold treatment per nursing request.  Patient sleeping in bed at time of attempt, per RN patient has had a busy afternoon and is being discharged later today.

## 2023-06-16 NOTE — PROGRESS NOTES
1346- Pt to pacu. Bp/HR elevated. Pt with harsh cough. Pt minimally responsive. 1348- pt reports nausea, order for medication received. 1415- xray complete, medicated for pain. 1427- pt resting in bed eyes clsoed, snoring resp. Bp remains elevated. 1436- pt resting in bed eyes closed resp easy, unlabored. Pt appears in no acute distress. 1445- pt resting in bed eyes closed. Pt with snoring resp. 1450- report called to [address] forwarded to [phone], no answer, will call back    [phone]- pt meets criteria for discharge from pacu    1502-report called to [address]. Transport requested. 1524- pt remains in bed eyes closed. Resp easy, snoring at times. Appears in no acute distress. 1540- transport noted to be on hold, call to transport to have pt as active.     [facility] arrives to return pt to 8A 02
55 [facility] THERAPY MISSED TREATMENT NOTE  STRZ MED SURG 8A      Date: 6/15/2023  Patient Name: Germaine Dobbs        MRN: [id number]    : 1931  (80 y.o.)    REASON FOR MISSED TREATMENT:  ST contacted RN re: completion of MBS. RN reports patient continues to require highflow O2. ST to d/c MBS order at this time d/t x3 MT and follow patient at bedside. Will continue to assess readiness for MBS completion.      Steven Cope, [address] [place] [address]
[address] [place] [address]  Occupational Therapy  Daily Note  Time:   Time In: 1000  Time Out: 1058  Timed Code Treatment Minutes: 62 Minutes  Minutes: 58          Date: 6/15/2023  Patient Name: Calin Baker,   Gender: female      Room: [facility]002-A  MRN: [id number]  : 1931  (80 y.o.)  Referring Practitioner: JEFFERY Perez CNP  Diagnosis: other closed displaced fracture of proximal end of right humerus  Additional Pertinent Hx: Pt is a 80 y.o. female s/p Open treatment right proximal humerus fracture with a prosthesis, open treatment left proximal humerus fracture with a prosthesis    Restrictions/Precautions:  Restrictions/Precautions: General Precautions, Weight Bearing, Fall Risk  Right Upper Extremity Weight Bearing: Non Weight Bearing  Left Upper Extremity Weight Bearing: Non Weight Bearing  Position Activity Restriction  Other position/activity restrictions: Light ADLs only BUE, no slings needed, okay for wrist elbow digital rom     SUBJECTIVE: Patient supine in bed upon arrival.  Patient becomes tearful stating \"you girls are not listening to what I am saying\". Patient believes a medication is \"causing me to turn purple\". Patient also notes that this writer is purple despite writer reassuring patient that she is not purple. Patient states \"it was a rainbow sweeper that caused me to fall because I was following the map\". Word salad. Patient pleasant and cooperative, agreeable to therapy with patient son present. PAIN: 0/10:     Vitals: Vitals not assessed per clinical judgement, see nursing flowsheet    COGNITION: Slow Processing, Decreased Recall, Decreased Insight, Decreased Problem Solving, and Decreased Safety Awareness    ADL:   Grooming: Maximum Assistance. Brush hair  Footwear Management: Maximum Assistance. David B socks .     BALANCE:  Sitting Balance:  Stand By Assistance, X 1. Seated EOB  Standing Balance: Minimal Assistance, X 1, with cues for
Attempted to call report to [facility] several times. Did finally speak to a nurse on floor 2.  Pt going to abdi WINSTON
Chart review for SBIRT per trauma service. Per [facility], pt has a history of dementia and is \"obviously confused\". Unable to complete, pt not appropriate.
Echo was completed at bedside. Echo was given to Dr. Analia Carbajal to read (reader of the week).
[facility]  INPATIENT SPEECH THERAPY  STRZ MED SURG 8A  DAILY NOTE    TIME   SLP Individual Minutes  Time In: 8683  Time Out: 8550  Minutes: 10  Timed Code Treatment Minutes: 0 Minutes       Date: 2023  Patient Name: Miguel Cary      CSN: [id number]   : 1931  (80 y.o.)  Gender: female   Referring Physician:  Chante Aquino PA-C  Diagnosis: Other closed displaced fracture of proximal end of right humerus, initial encounter  Precautions: General, fall risk  Current Diet: Soft + bite sized. Thin liquids. SINGLE, SMALL drinks. Swallowing Strategies:  Set-up with Meals, Full Upright Position, Small Bite/Sip, SINGLE drinks, Pulmonary Monitoring, Medications Whole with Puree, Direct 1:1 Supervision, Alternate Solids and Liquids, and Monitor for Fatigue   Date of Last MBS/FEES: Not Applicable    Pain:  No pain reported. Subjective:  Patient seen with RN approval, while RN present in room. Patient resting in bed, with daughter at bedside. Daughter is new family member from previous ST evaluation in day prior where daughter and granddaughter were present. Short-Term Goals:  SHORT TERM GOAL #1:  Goal 1: Patient, family, and ST to collaborate on patient's goals of care to promote improved swallow functioning, with potential completion of MBS, to further drive treatment plan. INTERVENTIONS: ST provided direct education to patient's daughter regarding basic anatomy/physiology of swallow mechanism, rationale for recently changed diet level (soft + bite sized), compensatory swallowing strategies (full upright positioning, SMALL drinks, SINGLE drinks, alternating solids/liquid), s/s aspiration (immediate/delayed coughing, throat clearing, wet vocal quality), and potential implications (pneumonia, recurrent/prolonged hospitalizations, medical decline).  Further explained to this family member, the availability of MBS for further evaluation of abilities and potential outcomes/recommendations
Hospitalist Progress Note      Patient:  Nilsa Jacome    Unit/Bed:8A-02/002-A  YOB: 1931  MRN: [id number]   Acct: [de-identified]   PCP: Mike Jerry DO  Date of Admission: 6/9/2023    Assessment/Plan:    Frequent falls: likely secondary to deconditioning, PT/OT. MRI brain without acute abnormality, does show severe volume loss. Total CK mildly elevated, continue IVFs. B/l UE pain secondary to b/l humerus fxs: Ortho primary and managing. Operative vs non-operative tx.   6/12: family wish to proceed with surgery, slated for 6/12. Acute on chronic macrocytic anemia: blood loss contributing from #2, discussed with patient and family who are now agreeable to blood. 1 unit PRBC and repeat HH, Q 8 hour checks from there on.   6/12: Ortho recommends additional unit PRBC, which is ordered. Hgb improved to 9.5 s/p second transfusion. Pre-operative risk assessment: per RCRI criteria, pt is a Class 1 risk and therefore represents a 3.9% increased risk of cardiac arrest, death, or MI in 30 days. This however does not account for pt's current anemia, which certainly is a contributing factor of risk as likely this anemia has potential to worsen postoperatively, not to mention her age, physical and mental debility, and co-morbidities. Hypokalemia: likely due to GI losses, replete per protocol and repeat bmp in am. Check Mag. CKD stage III: Cr is stable at 1.1, continue to monitor. Avoid nephrotoxic agents. NAGMA:  likely due to GI losses with chronic diarrhea. Will start bicarb gtt and repeat BMP Q 4 hours. 6/12: improved s/p bicarbonate gtt, continue with PO sodium bicarb tabs and repeat BMP in am   Elevated pro-BNP: b/l LE edema, will obtain ECHO to assess especially given potential OR plan. Currently b/l LE edema is improved per son.    Leukocytosis, resolved  Dementia: per family this has been worsened while in the hospital,
Hospitalist Progress Note    Patient:  Barbra Charles    Unit/Bed:8A-02/002-A  YOB: 1931  MRN: [id number]   Acct: [de-identified]   PCP: Simon Jerry DO  Code Status: Limited  Date of Admission: 6/9/2023      Assessment/Plan:    Frequent falls: PT/OT. MRI brain without acute abnormality, does show severe volume loss. Bilateral proximal humerus fractures: Management per Ortho. S/p bilateral shoulder дмитрий arthroplasty 6/13. Essential HTN: BP stable. Cont home amlodipine, metoprolol. Monitor. Acute on chronic anemia: blood loss from #2 contributing. Received 2U PRBC. Stable, no further signs of acute bleeding. CKD stage III: Cr stable with baseline. NAGMA: likely secondary to chronic diarrhea. Pt was treated with bicarb gtt and transitioned to PO sodium bicarb tabs. Repeat BMP in AM. Likely can dc sodium bicarb, awaiting BMP still today, 6/15. Acute on Chronic HFpEF: Echo was done 6/13/23 for b/l edema and elevated BNP. EF 60%, G1DD. LA mild to moderately dilated. RVSP 40mmHg. On metoprolol. Pt with generalized swelling, greater in upper extremities. PO Bumex given 6/14 with some improvement, will continue today. Dementia: resume Remeron. Chief Complaint: b/l UE pain s/p fall     HPI / Hospital Course: Per last progress note: \"\"The patient is a 80 y.o. female who presents 6/9/2023 to [facility] ER via EMS with complaints of falling and hitting her head. Patient has a past medical history significant for lifetime nonsmoker, Essential HPTN-2010 LVEF 55-65%, RBBB, Chronic Pancreatitis, GERD, Dyslipidemia, Seizure, Diarrhea, Ulcerative Colitis, Short Term Memory Loss, Depression, Vitamin D Deficiency, B12 Deficiency. Recent 5-6 months ago [facility] Hospitalization per family for Colon Resection secondary to prolapse     Pt arrived to [facility] ED via EMS with complaints of falling and hitting her head. Per EMS, pt has unknown downtime. Pt complaining of pain to left arm.
No vital signs will be taken at 4am d/t patient getting upset and dementia and per family.
[facility]  INPATIENT PHYSICAL THERAPY  EVALUATION  [facility] MED SURG 8A - 8A-02/002-A    Time In: 7198  Time Out: 7160  Timed Code Treatment Minutes: 40 Minutes  Minutes: 40          Date: 2023  Patient Name: Deonte Aleman,  Gender:  female        MRN: [id number]  : 1931  (80 y.o.)      Referring Practitioner: ERNA GIL- CNP  Diagnosis: closed fractures of anatomical neck of shannan humerus  Additional Pertinent Hx: Pt admitted s/p fall with BUEE humeral fractures. ORIF with hemiarthroplasties (23)     Restrictions/Precautions:  Restrictions/Precautions: General Precautions, Weight Bearing, Fall Risk  Right Upper Extremity Weight Bearing: Non Weight Bearing  Left Upper Extremity Weight Bearing: Non Weight Bearing  Position Activity Restriction  Other position/activity restrictions: Light ADLs only BUE, no slings needed, okay for wrist elbow digital rom    Subjective:  Chart Reviewed: Yes  Patient assessed for rehabilitation services?: Yes  Subjective: Nurse approved session. Pt resting in bed. Son observed last 1/2 of session. Pt was cooperative, but disoriented. General:     Vision: Impaired  Vision Exceptions: Wears glasses at all times  Hearing: Exceptions to [facility]  Hearing Exceptions: Hard of hearing/hearing concerns, Bilateral hearing aid       Pain: not rated/10: Pt current with pain meds. Min groaning noted with sit <->supine transitions.        Vitals: Vitals not assessed per clinical judgement, see nursing flowsheet    Social/Functional History:    Type of Home: Assisted living  Home Layout: One level  Home Access: Level entry  Home Equipment: Walker, rolling     Bathroom Shower/Tub: Walk-in shower  Bathroom Toilet: Handicap height  Bathroom Equipment: Grab bars in shower       ADL Assistance: Independent  Homemaking Assistance: Needs assistance  Ambulation Assistance: Independent  Transfer Assistance: Independent          Additional Comments: Per discussion with pt
Orthopaedic Progress Note      SUBJECTIVE   Ms. Bashir Caballero is post op day # 1 bilateral shoulder дмитрий arthroplasty     Seen at bedside this morning  no adverse events overnight  Tolerated procedure well  Pain appears well controlled compared to preoperative encounters        OBJECTIVE      Physical    VITALS:  BP (!) 170/67   Pulse 82   Temp 98.1 °F (36.7 °C) (Oral)   Resp 16   Ht 4' 9\" (1.448 m)   Wt 130 lb (59 kg)   SpO2 95%   BMI 28.13 kg/m²   I/O last 3 completed shifts: In: 4349 [P.O.:120;  I.V.:1500]  Out: 65 [Urine:2800; Blood:350]    Gen: oriented to name, , injury, location/ history, not year or president   Head: normorcephalic, laceration to brow   Resp: unlabored, room air  Pelvis: stable  RUE: incision c/d/I, anticipated drainage beneath Tegaderm, stable, scattered ecchymosis stable and anticipated, able to initiate ROM in all planes, painless PROM shoulder elbow wrist digits, distal pulses palpable, SILT  LUE: incision c/d/I, anticipated drainage beneath Tegaderm, stable, scattered ecchymosis stable and anticipated, able to initiate ROM in all planes, painless PROM shoulder elbow wrist digits, distal pulses palpable, SILT      Data  CBC:   Lab Results   Component Value Date/Time    WBC 9.0 2023 05:43 AM    HGB 8.5 2023 05:43 AM     2023 05:43 AM     BMP:    Lab Results   Component Value Date/Time     2023 05:43 AM    K 4.3 2023 05:43 AM    K 4.3 2021 03:43 AM     2023 05:43 AM    CO2 14 2023 05:43 AM    BUN 19 2023 05:43 AM    CREATININE 1.1 2023 05:43 AM    CALCIUM 7.7 2023 05:43 AM    GLUCOSE 136 2023 05:43 AM     Uric Acid:  No components found for: URIC  PT/INR:    Lab Results   Component Value Date/Time    INR 0.88 2023 03:30 PM     Troponin:  No results found for: TROPONINI  Urine Culture:  No components found for: CURINE      Current Inpatient Medications    Current Facility-Administered
Orthopaedic Progress Note      SUBJECTIVE   Ms. Chele Leyva is post op day # 3 bilateral shoulder дмитрий arthroplasty     Seen at bedside this morning  no adverse events overnight  Tolerated procedure well  Pain appears well controlled compared to preoperative encounters  Granddaughter present         OBJECTIVE      Physical    VITALS:  BP (!) 161/77   Pulse 69   Temp 99.1 °F (37.3 °C) (Axillary)   Resp 18   Ht 4' 9\" (1.448 m)   Wt 134 lb 14.7 oz (61.2 kg)   SpO2 100%   BMI 29.20 kg/m²   I/O last 3 completed shifts:   In: 1100 [P.O.:1100]  Out: 4450 [Urine:4450]    Gen: oriented to name, , injury however takes more encouragement and direction early in morning, this improves during the day  Head: normorcephalic, laceration to brow   Resp: unlabored, room air  Pelvis: stable  RUE: incision c/d/I, anticipated drainage beneath Tegaderm, stable, scattered ecchymosis stable and anticipated, able to initiate ROM in all planes, painless PROM shoulder elbow wrist digits, distal pulses palpable, SILT  LUE: incision c/d/I, anticipated drainage beneath Tegaderm, stable, scattered ecchymosis stable and anticipated, able to initiate ROM in all planes, painless PROM shoulder elbow wrist digits, distal pulses palpable, SILT      Data  CBC:   Lab Results   Component Value Date/Time    WBC 11.3 06/15/2023 06:37 PM    HGB 9.0 06/15/2023 06:37 PM     06/15/2023 06:37 PM     BMP:    Lab Results   Component Value Date/Time     06/15/2023 06:37 PM    K 3.1 06/15/2023 06:37 PM    K 4.3 2021 03:43 AM     06/15/2023 06:37 PM    CO2 17 06/15/2023 06:37 PM    BUN 19 06/15/2023 06:37 PM    CREATININE 0.9 06/15/2023 06:37 PM    CALCIUM 8.3 06/15/2023 06:37 PM    GLUCOSE 109 06/15/2023 06:37 PM     Uric Acid:  No components found for: URIC  PT/INR:    Lab Results   Component Value Date/Time    INR 0.88 2023 03:30 PM     Troponin:  No results found for: TROPONINI  Urine Culture:  No components found for:
Orthopaedic Progress Note      SUBJECTIVE   Ms. Mary Anne Hopkins is hospital day 5    Bilateral proximal humerus fractures  L > R in terms of pain at this time  L sling intact, none on RUE  Pain control stable at rest, worsened with mobilization  no adverse events overnight  Family bedside    OBJECTIVE      Physical    VITALS:  BP (!) 159/70   Pulse 80   Temp 100.4 °F (38 °C) (Axillary)   Resp 18   Ht 4' 9\" (1.448 m)   Wt 130 lb (59 kg)   SpO2 97%   BMI 28.13 kg/m²   I/O last 3 completed shifts: In: 404 [Blood:404]  Out: 1000 [Urine:1000]    GENERAL APPEARANCE: Resting, orients with waking. Confuses as discussion progresses. No acute distress, except appropriate to injury. MOOD AND AFFECT: Calm appropriate to situation, room air  MSK  LUE- atraumatic clavicle, humerus palpation appears concentric, neutral alignment elbow wrist, no lacerations or lesions, ecchymosis to medial shaft of humerus, nontender to palpation clavicle,TTP anterior posterior shoulder humeral shaft, nontender elbow forearm wrist, unable to perform rom due to pain, passively able to ff and abd minimally, able to shrug shoulder, no pain with flexion extension pronation supination elbow and wrist, composite fist without deficit, sensation to light touch intact, distal pulses palpable. RUE- atraumatic clavicle, shoulder, progressive ecchymosis to lateral humerus and elbow, neutral alignment elbow wrist, no lacerations or lesions, nontender to palpation clavicle,TTP anterior posterior shoulder humeral shaft, nontender elbow forearm wrist, unable to perform rom due to pain, no pain with flexion extension pronation supination elbow and wrist, composite fist without deficit, sensation to light touch intact, distal pulses palpable.         Data  CBC:   Lab Results   Component Value Date/Time    WBC 9.4 06/13/2023 05:40 AM    HGB 9.9 06/13/2023 05:40 AM     06/13/2023 05:40 AM     BMP:    Lab Results   Component Value Date/Time    
Patient crying in pain laying there with family member at her side. Patient was given morphine and still in pain and will give her roxicodone until her next dose will be due for morphine. Patient crying and worried about surgery tomorrow - also noted that her BP was extremely high and retook the BP after pain medication -she didn't receive a lot of pain medication during the day and she is hurting this evening.
Patient is very confused at night - patient started kicking at nurse when trying to put in the pain medicine - daughter at bedside tried to explain to her mother that we are giving her pain medication thru the IV but she kept stating, Linda Nunes are trying to kill me. \" Patient is in a lot of pain and discomfort and trying to get her pain under control.
Patient lost her IV access this morning. IV therapy was unable to start a new one due to patient condition. The attending PA was notified and ok to have the patient without an IV at this time.
[facility]  INPATIENT SPEECH THERAPY  STRZ MED SURG 8A  DAILY NOTE    TIME   SLP Individual Minutes  Time In: 1500  Time Out:   Minutes: 9  Timed Code Treatment Minutes: 0 Minutes       Date: 2023  Patient Name: Gilmar Escobar      CSN: [id number]   : 1931  (80 y.o.)  Gender: female   Referring Physician:  Tracy Sanchez PA-C  Diagnosis: Other closed displaced fracture of proximal end of right humerus, initial encounter  Precautions: General, fall risk  Current Diet: Regular solids. Thin liquids. SINGLE, SMALL drinks. Swallowing Strategies:  Set-up with Meals, Full Upright Position, Small Bite/Sip, SINGLE drinks, Pulmonary Monitoring, Medications Whole with Puree, Direct 1:1 Supervision, Alternate Solids and Liquids, and Monitor for Fatigue   Date of Last MBS/FEES: Not Applicable    Pain:   - Pain location: \"all over\"    Subjective:  Patient seen with RN approval. RN also reporting planned discharge for later this date. No family present in room. Short-Term Goals:  SHORT TERM GOAL #1:  Goal 1: Patient, family, and ST to collaborate on patient's goals of care to promote improved swallow functioning, with potential completion of MBS, to further drive treatment plan. INTERVENTIONS: MBS not completed during this treatment period. Patient's family with no further communication regarding wishes to proceed. ST educated patient and RN on potential completion on outpatient basis if further evaluation is desired. Patient likely with minimal retention of information. RN verbalized understanding.     SHORT TERM GOAL #2:  Goal 2: Patient, family, and staff will follow recommended safe swallowing strategies (Set-up with Meals, Full Upright Position, SINGLE drinks, Small Bite/Sip, Pulmonary Monitoring, Medications Whole with Puree, Direct 1:1 Supervision, Alternate Solids and Liquids, and Monitor for Fatigue) during intake of meals and snacks to promote safety with oral intake of least
Pt received from PACU to  8A02 via bed. Complaints: pain from surgery. IV normal saline infusing into the foot left, condition patent and no redness at a rate of 100 mls/ hour with about 300 mls in the bag still. IV site free of s/s of infection or infiltration. Vital signs obtained. Noted drainage at right shoulder dressing. Patient able to answer assessment questions but still lethargic. Family requested whenever tapes are removed from patient to be very careful because they notice some skin tear from previously where tapes were.
Report called to nurse taking pt at [facility]. All questions answered. Pt awake, alert, vss at time of discharge. Pt transported via [facility].
Facility-Administered Medications: fentaNYL (SUBLIMAZE) injection 50 mcg, 50 mcg, IntraVENous, Q5 Min PRN  fentaNYL (SUBLIMAZE) injection 25 mcg, 25 mcg, IntraVENous, Q5 Min PRN  labetalol (NORMODYNE;TRANDATE) injection 10 mg, 10 mg, IntraVENous, Q15 Min PRN  sodium bicarbonate tablet 650 mg, 650 mg, Oral, 4x Daily  potassium chloride (KLOR-CON M) extended release tablet 40 mEq, 40 mEq, Oral, PRN **OR** potassium bicarb-citric acid (EFFER-K) effervescent tablet 40 mEq, 40 mEq, Oral, PRN **OR** potassium chloride 10 mEq/100 mL IVPB (Peripheral Line), 10 mEq, IntraVENous, PRN  fentaNYL (SUBLIMAZE) injection, , , Daily PRN  morphine (PF) injection 2 mg, 2 mg, IntraVENous, Once  sodium chloride flush 0.9 % injection 5-40 mL, 5-40 mL, IntraVENous, 2 times per day  sodium chloride flush 0.9 % injection 5-40 mL, 5-40 mL, IntraVENous, PRN  0.9 % sodium chloride infusion, , IntraVENous, PRN  morphine (PF) injection 2 mg, 2 mg, IntraVENous, Q2H PRN **OR** morphine (PF) injection 4 mg, 4 mg, IntraVENous, Q2H PRN  ondansetron (ZOFRAN-ODT) disintegrating tablet 4 mg, 4 mg, Oral, Q8H PRN **OR** ondansetron (ZOFRAN) injection 4 mg, 4 mg, IntraVENous, Q6H PRN  polyethylene glycol (GLYCOLAX) packet 17 g, 17 g, Oral, Daily PRN  acetaminophen (TYLENOL) tablet 650 mg, 650 mg, Oral, Q6H  oxyCODONE (ROXICODONE) immediate release tablet 5 mg, 5 mg, Oral, Q4H PRN **OR** oxyCODONE (ROXICODONE) immediate release tablet 10 mg, 10 mg, Oral, Q4H PRN  bisacodyl (DULCOLAX) EC tablet 5 mg, 5 mg, Oral, Daily  amLODIPine (NORVASC) tablet 2.5 mg, 2.5 mg, Oral, Lunch  atorvastatin (LIPITOR) tablet 40 mg, 40 mg, Oral, Nightly  budesonide (ENTOCORT EC) extended release capsule 6 mg, 6 mg, Oral, QAM  famotidine (PEPCID) tablet 20 mg, 20 mg, Oral, Nightly  levETIRAcetam (KEPPRA) tablet 500 mg, 500 mg, Oral, Daily  levETIRAcetam (KEPPRA) tablet 250 mg, 250 mg, Oral, Nightly  melatonin tablet 4.5 mg, 4.5 mg, Oral, Nightly  metoprolol tartrate (LOPRESSOR)
PRN  bisacodyl (DULCOLAX) EC tablet 5 mg, 5 mg, Oral, Daily  amLODIPine (NORVASC) tablet 2.5 mg, 2.5 mg, Oral, Lunch  atorvastatin (LIPITOR) tablet 40 mg, 40 mg, Oral, Nightly  budesonide (ENTOCORT EC) extended release capsule 6 mg, 6 mg, Oral, QAM  famotidine (PEPCID) tablet 20 mg, 20 mg, Oral, Nightly  levETIRAcetam (KEPPRA) tablet 500 mg, 500 mg, Oral, Daily  levETIRAcetam (KEPPRA) tablet 250 mg, 250 mg, Oral, Nightly  melatonin tablet 4.5 mg, 4.5 mg, Oral, Nightly  metoprolol tartrate (LOPRESSOR) tablet 25 mg, 25 mg, Oral, BID  mirtazapine (REMERON) tablet 7.5 mg, 7.5 mg, Oral, Nightly  multivitamin 1 tablet, 1 tablet, Oral, Daily  pantoprazole (PROTONIX) tablet 40 mg, 40 mg, Oral, QAM AC  vitamin D (CHOLECALCIFEROL) tablet 1,000 Units, 1,000 Units, Oral, Daily        PLAN    Ms. Sailaja Brooks is hospital day 4    Bilateral proximal humerus fractures    Family bedside today, again discussed at length fractures and options for treatment, conservative vs operative interventions addressed and the process/outcomes/ risk benefit/ natural history of each.      Family and patient leaning toward surgical intervention for both shoulders at this time    -palliative to see today to further discuss goals of care  -perioperative risk assessment per hospitalist, patient family aware  -VICKI AQUINO, maintain sling as able  -will plan for bilateral shoulder arthroplasty tomorrow with Dr Ang Sargent pending palliative care discussion   -NPO midnight
independence, requires hands on assistance for safety with mobility and would benefit from continued skilled PT improve these impairments, to maximize independence, to prevent falls and ensure safety with mobility, pt unsafe to return home at this time, pt will greatly benefit from continued skilled PT, pt will greatly benefit from SNF stay. Significant confusion, balance deficits, and strength deficits displayed during session. Pt's son present for second half of therapy session and stated the confusion has been going on for awhile. Activity Tolerance:  Patient tolerance of  treatment: poor. Equipment Recommendations:Equipment Needed: No  Discharge Recommendations: Subacte/Skilled Nursing Facility and Patient would benefit from continued PT at discharge  Plan: Current Treatment Recommendations: Balance training, Functional mobility training, Transfer training, Endurance training, Gait training, Therapeutic activities, Safety education & training, Patient/Caregiver education & training  General Plan:  (5x/ wk O)    Patient Education  Patient Education: Plan of Care, Precautions/Restrictions, Bed Mobility, Transfers, Use of Gait Belt, Verbal Exercise Instruction, Education Related to Prevention of Recurrence of Impairment/Illness/Injury, - Patient Requires Continued Education    Goals:  Patient Goals : did not state  Short Term Goals  Time Frame for Short Term Goals: until discharge  Short Term Goal 1: Pt to go supine <->sit, mod +1, to get in/out of bed. Short Term Goal 2: Pt to get up/down from bed/recliner, +1 min assist.  Short Term Goal 3: Pt to perform stand/pivot transfer, no AD, +1 min assist to get in/out of chair. Long Term Goals  Time Frame for Long Term Goals : NA due to expected short LOS    Following session, patient left in safe position with all fall risk precautions in place.
education & training, Return to work related activity, Self-Care / ADL. See long-term goal time frame for expected duration of plan of care. If no long-term goals established, a short length of stay is anticipated. Goals:  Patient goals : return to assist living  Short Term Goals  Time Frame for Short Term Goals: by discharge  Short Term Goal 1: Pt will safely navigate short distances with CGA and no safety cues for increased indep with ADLs  Short Term Goal 2: Pt will tolerate dynamic standing with no UE support and CGA for increased indep with grooming  Short Term Goal 3: Pt will complete UB dressing with Max A and min VC for technique to increase indep with ADLs  Short Term Goal 4: Pt will complete distal UE therex (elbow, wrist, digits) with Min A and min VC for technique for edema and pain control for increased indep with ADLs         Following session, patient left in safe position with all fall risk precautions in place.
4. Soft tissue injury of the left axilla and left chest wall. 5. Comminuted fracture of the proximal right humerus. Final report electronically signed by Dr. Erik Vasquez on 6/9/2023 6:29 PM    CT CERVICAL SPINE WO CONTRAST    Result Date: 6/9/2023  PROCEDURE: CT CERVICAL SPINE WO CONTRAST CLINICAL INFORMATION: Fall . TECHNIQUE: 3 mm noncontrast images of the cervical spine with sagittal and coronal reconstructions All CT scans at this facility use dose modulation, iterative reconstruction, and/or weight-based dosing when appropriate to reduce radiation dose to as low as reasonably achievable. COMPARISON: 2/3/2021 FINDINGS: There is no acute fracture or acute bony malalignment of the cervical spine. There is no foraminal or central encroachment at any level. There is no precervical soft tissue swelling The completely displaced fracture of the left humeral neck with anterior and medial displacement of the humerus is noted. No acute fracture of the cervical spine. **This report has been created using voice recognition software. It may contain minor errors which are inherent in voice recognition technology. ** Final report electronically signed by Dr. Axel Baer on 6/9/2023 4:41 PM    VL DUP LOWER EXTREMITY VENOUS BILATERAL    Result Date: 6/10/2023  PROCEDURE: VL DUP LOWER EXTREMITY VENOUS BILATERAL CLINICAL INFORMATION: Bilateral leg edema TECHNIQUE: High-resolution duplex ultrasound with spectral analysis of the bilateral lower extremities was performed. The common femoral, femoral, popliteal and calf vein segments were studied to the ankles. COMPARISON: Bilateral lower extremity venous duplex ultrasound 3/31/2009 FINDINGS: There is normal compressibility with no evidence of thrombus. Flow study shows normal color flow, augmentation and phasic response. No evidence of deep venous thrombosis in either lower extremity. **This report has been created using voice recognition software.  It may contain minor

## 2023-06-16 NOTE — DISCHARGE SUMMARY
Physician Discharge Summary     Patient ID:  Steffen Muñoz  [id number]  78 y.o.  6/21/1931    Admit date: 6/9/2023    Discharge date and time: 6/16/23    Admitting Physician: Jarvis Membreno MD     Discharge Physician: Jarvis Membreno MD     Admission Diagnoses: Closed fracture of anatomical neck of left humerus, initial encounter [S42.292A]  Closed fracture of anatomical neck of right humerus, initial encounter Fayrene Russwn  Other closed displaced fracture of proximal end of right humerus, initial encounter [S42.291A]    Discharge Diagnoses: Closed fracture of anatomical neck of left humerus, initial encounter [S42.292A]  Closed fracture of anatomical neck of right humerus, initial encounter Fayrene Russwn  Other closed displaced fracture of proximal end of right humerus, initial encounter [S42.291A]    Admission Condition: good    Discharged Condition: good    Indication for Admission: unstable orthopaedics injury    Hospital Course: Patient is now s/p bilateral shoulder hemiarthroplasty on 6/13/23 by Dr. Chiquita Barrientos. Patient presented to the ED  after sustaining a fall resulting in bilateral humerus fractures. Palliative care, hospitalist, consulted for goals of care and optimization, family and patient wished to proceed with surgery. On the day of surgery, patient was identified in the pre-operative holding area and agreeable to proceed with surgery. Written consent was obtained. Please see operative note for further details of this procedure. Patient received connie-operative antibiotics. Patient recovered in PACU before transfer to a regular nursing floor. Patient was started on tylenol and norco for pain control. On the day of discharge, patient was afebrile with stable vital signs, stable upon physical exam. Patient was discharged with prescriptions for pain and dvt ppx. Patient was deemed stable for discharge to SNF as recommended by PT/OT. Patient will follow up with Dr Chiquita Barrientos in 2-3 weeks for post-operative visit.

## 2023-06-16 NOTE — CARE COORDINATION
6/16/23, 9:51 AM EDT    Patient goals/plan/ treatment preferences discussed by  and . Patient goals/plan/ treatment preferences reviewed with patient/ family. Patient/ family verbalize understanding of discharge plan and are in agreement with goal/plan/treatment preferences. Understanding was demonstrated using the teach back method. AVS provided by RN at time of discharge, which includes all necessary medical information pertaining to the patients current course of illness, treatment, post-discharge goals of care, and treatment preferences. Services At/After Discharge: [facility]    Patient plans to discharge to [facility]. Family is planning on transporting at discharge. STEPHANY notified [address] [place] [address] Road of discharge today. RN made aware and discharge instructions placed on chart. IMM Letter  IMM Letter given to Patient/Family/Significant other/Guardian/POA/by[de-identified] Amy Conde RN CM  IMM Letter date given[de-identified] 06/14/23  IMM Letter time given[de-identified] 0639       11:46 AM update:  STEPHANY was notified by RN that family is not able to transport. LACP scheduled for 6:30 pm.  RN updated.     STEPHANY faxed AVS.

## 2023-06-16 NOTE — PLAN OF CARE
Consult received, see sw note dated 6/12/2023.
Hosp following. Admitted under Ortho for b/l humerus fxs. Plan for OR today. Zach Wally will continue to follow in the post-operative period. Metabolic acidosis improving with sodium bicarbonate tablets, await GI panel results. Has not yet been collected ?      Repeat labs in the am.     Electronically signed by Emily Weston PA-C on 6/13/2023 at 8:02 AM
Primary planning discharge today. Patient is stable from Hospitalist standpoint. Okay for discharge after calcium is replaced. Will send with calcium carbonate tabs daily. NH to repeat BMP and iCAL in 3-5 days and follow up with PCP.        Electronically signed by Kevin Pineda PA-C on 6/16/2023 at 4:06 PM
Problem: Discharge Planning  Goal: Discharge to home or other facility with appropriate resources  Outcome: Progressing     Problem: Pain  Goal: Verbalizes/displays adequate comfort level or baseline comfort level  Outcome: Progressing     Problem: Skin/Tissue Integrity  Goal: Absence of new skin breakdown  Description: 1. Monitor for areas of redness and/or skin breakdown  2. Assess vascular access sites hourly  3. Every 4-6 hours minimum:  Change oxygen saturation probe site  4. Every 4-6 hours:  If on nasal continuous positive airway pressure, respiratory therapy assess nares and determine need for appliance change or resting period.   Outcome: Progressing
Problem: Discharge Planning  Goal: Discharge to home or other facility with appropriate resources  Outcome: Progressing  Flowsheets (Taken 6/13/2023 2030 by Lucy An RN)  Discharge to home or other facility with appropriate resources: Identify barriers to discharge with patient and caregiver     Problem: Pain  Goal: Verbalizes/displays adequate comfort level or baseline comfort level  6/15/2023 0117 by Nesha Pepper RN  Outcome: Progressing  Flowsheets (Taken 6/14/2023 0229 by Lucy An RN)  Verbalizes/displays adequate comfort level or baseline comfort level: Encourage patient to monitor pain and request assistance  6/14/2023 1843 by Darshan Cleveland RN  Outcome: Progressing     Problem: Skin/Tissue Integrity  Goal: Absence of new skin breakdown  Description: 1. Monitor for areas of redness and/or skin breakdown  2. Assess vascular access sites hourly  3. Every 4-6 hours minimum:  Change oxygen saturation probe site  4. Every 4-6 hours:  If on nasal continuous positive airway pressure, respiratory therapy assess nares and determine need for appliance change or resting period.   6/15/2023 0117 by Nesha Pepper RN  Outcome: Progressing  6/14/2023 1843 by Darshan Cleveland RN  Outcome: Progressing     Problem: Safety - Adult  Goal: Free from fall injury  6/15/2023 0117 by Nesha Pepper RN  Outcome: Progressing  Flowsheets (Taken 6/15/2023 0115)  Free From Fall Injury:   Instruct family/caregiver on patient safety   Based on caregiver fall risk screen, instruct family/caregiver to ask for assistance with transferring infant if caregiver noted to have fall risk factors  6/14/2023 1843 by Darshan Cleveland RN  Outcome: Progressing     Problem: ABCDS Injury Assessment  Goal: Absence of physical injury  6/15/2023 0117 by Nesha Pepper RN  Outcome: Schultz Dancer (Taken 6/11/2023 2224 by Patrick Howard RN)  Absence of Physical Injury: Implement safety measures based on patient assessment  6/14/2023
Problem: Pain  Goal: Verbalizes/displays adequate comfort level or baseline comfort level  6/12/2023 2156 by Edmond Jimenez RN  Outcome: Not Progressing     Problem: Discharge Planning  Goal: Discharge to home or other facility with appropriate resources  6/12/2023 2156 by Edmond Jimenez RN  Outcome: Progressing     Problem: Skin/Tissue Integrity  Goal: Absence of new skin breakdown  Description: 1. Monitor for areas of redness and/or skin breakdown  2. Assess vascular access sites hourly  3. Every 4-6 hours minimum:  Change oxygen saturation probe site  4. Every 4-6 hours:  If on nasal continuous positive airway pressure, respiratory therapy assess nares and determine need for appliance change or resting period.   6/12/2023 1232 by Constanza Modi RN  Outcome: Progressing     Problem: Pain  Goal: Verbalizes/displays adequate comfort level or baseline comfort level  6/12/2023 2156 by Edmond Jimenez RN  Outcome: Not Progressing  6/12/2023 1232 by Constanza Modi RN  Outcome: Progressing
Problem: Pain  Goal: Verbalizes/displays adequate comfort level or baseline comfort level  Outcome: Progressing     Problem: Skin/Tissue Integrity  Goal: Absence of new skin breakdown  Description: 1. Monitor for areas of redness and/or skin breakdown  2. Assess vascular access sites hourly  3. Every 4-6 hours minimum:  Change oxygen saturation probe site  4. Every 4-6 hours:  If on nasal continuous positive airway pressure, respiratory therapy assess nares and determine need for appliance change or resting period.   Outcome: Progressing     Problem: Safety - Adult  Goal: Free from fall injury  Outcome: Progressing     Problem: ABCDS Injury Assessment  Goal: Absence of physical injury  Outcome: Progressing
Problem: Pain  Goal: Verbalizes/displays adequate comfort level or baseline comfort level  Outcome: Progressing   Patient has no c/o pain. Will continue to monitor for pain. Problem: Skin/Tissue Integrity  Goal: Absence of new skin breakdown  Description: 1. Monitor for areas of redness and/or skin breakdown  2. Assess vascular access sites hourly  3. Every 4-6 hours minimum:  Change oxygen saturation probe site  4. Every 4-6 hours:  If on nasal continuous positive airway pressure, respiratory therapy assess nares and determine need for appliance change or resting period.   Outcome: Progressing     Problem: Discharge Planning  Goal: Discharge to home or other facility with appropriate resources  Outcome: Progressing  Flowsheets (Taken 6/15/2023 0902 by Montana Vargas RN)  Discharge to home or other facility with appropriate resources: Identify barriers to discharge with patient and caregiver
Every 4-6 hours:  If on nasal continuous positive airway pressure, respiratory therapy assess nares and determine need for appliance change or resting period. 6/11/2023 2224 by Roque Rios RN  Outcome: Progressing  Note: No new skin breakdown noted.   Encouraged patient to reposition in bed.     6/11/2023 1019 by Angela Alcaraz RN  Outcome: Progressing  Note: No new skin breakdown noted, pt offloading with pillows, will continue to assess skin     Problem: Safety - Adult  Goal: Free from fall injury  6/11/2023 2224 by Roque Rios RN  Outcome: Progressing  Flowsheets (Taken 6/11/2023 1019 by Angela Alcaraz RN)  Free From Fall Injury:   Instruct family/caregiver on patient safety   Based on caregiver fall risk screen, instruct family/caregiver to ask for assistance with transferring infant if caregiver noted to have fall risk factors  6/11/2023 1019 by Angela Alcaraz RN  Outcome: Progressing  Flowsheets (Taken 6/11/2023 1019)  Free From Fall Injury:   Instruct family/caregiver on patient safety   Based on caregiver fall risk screen, instruct family/caregiver to ask for assistance with transferring infant if caregiver noted to have fall risk factors  Note: Pt remains free from falls     Problem: ABCDS Injury Assessment  Goal: Absence of physical injury  Outcome: Progressing  Flowsheets (Taken 6/11/2023 2224)  Absence of Physical Injury: Implement safety measures based on patient assessment

## 2023-06-20 NOTE — TELEPHONE ENCOUNTER
Pt is at [facility] and she is at the [place] [address] option #4. Pt's nurse was unavailable. LM for her to return call.
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
## 2023-08-07 NOTE — PROGRESS NOTES
Arrival time: per office  Directions given:  Who will be transporting? Adam Prado  Who will be coming with patient? Adam Prado   Need to have someone with patient to sign post-op instruction sheet if MAC or    General anesthesia  NPO: take heart and BP medications am of surgery with small sip of water  Is patient oriented? yes  Does patient have POA? If patient has POA need to bring POA papers  Is patient ambulatory? Yes    Does patient use assistive devices? Wheel chair can walk short distances  Is patient non-weight bearing?  no   How many people does it take to move patient?  1 assist  ______Covid vaccine  ______Covid test    Height 58 in  Weight 101 lb  Fax: MAR, allergy list, medical/surgical history    General instructions:  NPO after midnight  [employer] and drivers license  Wear comfortable clean clothing  Do not bring jewelry   Shower night before and morning of surgery with a liquid antibacterial soap  Follow all instructions given by your physician   needed at discharge  Send medication list with last dose documented  Name of nurse you spoke with from nursing [place]

## 2023-08-08 RX ORDER — BUDESONIDE 3 MG/1
6 CAPSULE, COATED PELLETS ORAL EVERY MORNING
COMMUNITY

## 2023-08-08 RX ORDER — TRAMADOL HYDROCHLORIDE 50 MG/1
50 TABLET ORAL EVERY 6 HOURS PRN
Status: ON HOLD | COMMUNITY
End: 2023-08-11 | Stop reason: SDUPTHER

## 2023-08-08 RX ORDER — LOPERAMIDE HYDROCHLORIDE 2 MG/1
CAPSULE ORAL
COMMUNITY

## 2023-08-08 RX ORDER — POTASSIUM CHLORIDE 20 MEQ/1
20 TABLET, EXTENDED RELEASE ORAL DAILY
COMMUNITY

## 2023-08-08 RX ORDER — CHOLECALCIFEROL (VITAMIN D3) 125 MCG
5 CAPSULE ORAL DAILY
COMMUNITY

## 2023-08-08 RX ORDER — MENTHOL 100 MG/G
CREAM TOPICAL
COMMUNITY

## 2023-08-08 RX ORDER — MAGNESIUM HYDROXIDE/ALUMINUM HYDROXICE/SIMETHICONE 120; 1200; 1200 MG/30ML; MG/30ML; MG/30ML
10 SUSPENSION ORAL
COMMUNITY

## 2023-08-09 NOTE — PROGRESS NOTES
Called and left message  jaonn for recent lab work on patient that was done at nursing home.  Called nursing home left message

## 2023-08-10 ENCOUNTER — APPOINTMENT (OUTPATIENT)
Dept: GENERAL RADIOLOGY | Age: 88
End: 2023-08-10
Attending: ORTHOPAEDIC SURGERY
Payer: MEDICARE

## 2023-08-10 ENCOUNTER — ANESTHESIA EVENT (OUTPATIENT)
Dept: OPERATING ROOM | Age: 88
End: 2023-08-10
Payer: MEDICARE

## 2023-08-10 ENCOUNTER — HOSPITAL ENCOUNTER (OUTPATIENT)
Age: 88
Discharge: HOME | End: 2023-08-12
Attending: ORTHOPAEDIC SURGERY | Admitting: ORTHOPAEDIC SURGERY
Payer: MEDICARE

## 2023-08-10 ENCOUNTER — ANESTHESIA (OUTPATIENT)
Dept: OPERATING ROOM | Age: 88
End: 2023-08-10
Payer: MEDICARE

## 2023-08-10 DIAGNOSIS — Z96.612 PRESENCE OF ARTIFICIAL SHOULDER JOINT, LEFT: Primary | ICD-10-CM

## 2023-08-10 LAB
ANION GAP SERPL CALC-SCNC: 15 MEQ/L (ref 8–16)
BASOPHILS ABSOLUTE: 0 THOU/MM3 (ref 0–0.1)
BASOPHILS NFR BLD AUTO: 0.3 %
BUN SERPL-MCNC: 21 MG/DL (ref 7–22)
CALCIUM SERPL-MCNC: 8.9 MG/DL (ref 8.5–10.5)
CHLORIDE SERPL-SCNC: 111 MEQ/L (ref 98–111)
CO2 SERPL-SCNC: 14 MEQ/L (ref 23–33)
CREAT SERPL-MCNC: 1.2 MG/DL (ref 0.4–1.2)
DEPRECATED RDW RBC AUTO: 60.2 FL (ref 35–45)
EOSINOPHIL NFR BLD AUTO: 0.6 %
EOSINOPHILS ABSOLUTE: 0.1 THOU/MM3 (ref 0–0.4)
ERYTHROCYTE [DISTWIDTH] IN BLOOD BY AUTOMATED COUNT: 16.2 % (ref 11.5–14.5)
GFR SERPL CREATININE-BSD FRML MDRD: 42 ML/MIN/1.73M2
GLUCOSE SERPL-MCNC: 206 MG/DL (ref 70–108)
HCT VFR BLD AUTO: 38.7 % (ref 37–47)
HGB BLD-MCNC: 11.3 GM/DL (ref 12–16)
IMM GRANULOCYTES # BLD AUTO: 0.11 THOU/MM3 (ref 0–0.07)
IMM GRANULOCYTES NFR BLD AUTO: 1 %
LYMPHOCYTES ABSOLUTE: 1.5 THOU/MM3 (ref 1–4.8)
LYMPHOCYTES NFR BLD AUTO: 13.6 %
MCH RBC QN AUTO: 29.4 PG (ref 26–33)
MCHC RBC AUTO-ENTMCNC: 29.2 GM/DL (ref 32.2–35.5)
MCV RBC AUTO: 100.5 FL (ref 81–99)
MONOCYTES ABSOLUTE: 0.1 THOU/MM3 (ref 0.4–1.3)
MONOCYTES NFR BLD AUTO: 1 %
NEUTROPHILS NFR BLD AUTO: 83.5 %
NRBC BLD AUTO-RTO: 0 /100 WBC
PLATELET # BLD AUTO: 246 THOU/MM3 (ref 130–400)
PMV BLD AUTO: 8.4 FL (ref 9.4–12.4)
POTASSIUM SERPL-SCNC: 4.4 MEQ/L (ref 3.5–5.2)
RBC # BLD AUTO: 3.85 MILL/MM3 (ref 4.2–5.4)
SEGMENTED NEUTROPHILS ABSOLUTE COUNT: 9.3 THOU/MM3 (ref 1.8–7.7)
SODIUM SERPL-SCNC: 140 MEQ/L (ref 135–145)
WBC # BLD AUTO: 11.1 THOU/MM3 (ref 4.8–10.8)

## 2023-08-10 PROCEDURE — A4216 STERILE WATER/SALINE, 10 ML: HCPCS | Performed by: ORTHOPAEDIC SURGERY

## 2023-08-10 PROCEDURE — 6360000002 HC RX W HCPCS: Performed by: ORTHOPAEDIC SURGERY

## 2023-08-10 PROCEDURE — 73030 X-RAY EXAM OF SHOULDER: CPT

## 2023-08-10 PROCEDURE — 99223 1ST HOSP IP/OBS HIGH 75: CPT

## 2023-08-10 PROCEDURE — 6360000002 HC RX W HCPCS: Performed by: STUDENT IN AN ORGANIZED HEALTH CARE EDUCATION/TRAINING PROGRAM

## 2023-08-10 PROCEDURE — 2500000003 HC RX 250 WO HCPCS: Performed by: NURSE ANESTHETIST, CERTIFIED REGISTERED

## 2023-08-10 PROCEDURE — 85025 COMPLETE CBC W/AUTO DIFF WBC: CPT

## 2023-08-10 PROCEDURE — 2709999900 HC NON-CHARGEABLE SUPPLY: Performed by: ORTHOPAEDIC SURGERY

## 2023-08-10 PROCEDURE — 36415 COLL VENOUS BLD VENIPUNCTURE: CPT

## 2023-08-10 PROCEDURE — 6360000002 HC RX W HCPCS: Performed by: NURSE ANESTHETIST, CERTIFIED REGISTERED

## 2023-08-10 PROCEDURE — 6370000000 HC RX 637 (ALT 250 FOR IP): Performed by: NURSE PRACTITIONER

## 2023-08-10 PROCEDURE — 2580000003 HC RX 258: Performed by: ORTHOPAEDIC SURGERY

## 2023-08-10 PROCEDURE — 3700000000 HC ANESTHESIA ATTENDED CARE: Performed by: ORTHOPAEDIC SURGERY

## 2023-08-10 PROCEDURE — 2580000003 HC RX 258: Performed by: NURSE PRACTITIONER

## 2023-08-10 PROCEDURE — 6360000002 HC RX W HCPCS

## 2023-08-10 PROCEDURE — 3600000014 HC SURGERY LEVEL 4 ADDTL 15MIN: Performed by: ORTHOPAEDIC SURGERY

## 2023-08-10 PROCEDURE — 2720000010 HC SURG SUPPLY STERILE: Performed by: ORTHOPAEDIC SURGERY

## 2023-08-10 PROCEDURE — 6360000002 HC RX W HCPCS: Performed by: NURSE PRACTITIONER

## 2023-08-10 PROCEDURE — 3700000001 HC ADD 15 MINUTES (ANESTHESIA): Performed by: ORTHOPAEDIC SURGERY

## 2023-08-10 PROCEDURE — C9399 UNCLASSIFIED DRUGS OR BIOLOG: HCPCS | Performed by: NURSE ANESTHETIST, CERTIFIED REGISTERED

## 2023-08-10 PROCEDURE — 7100000001 HC PACU RECOVERY - ADDTL 15 MIN: Performed by: ORTHOPAEDIC SURGERY

## 2023-08-10 PROCEDURE — C1776 JOINT DEVICE (IMPLANTABLE): HCPCS | Performed by: ORTHOPAEDIC SURGERY

## 2023-08-10 PROCEDURE — 3600000004 HC SURGERY LEVEL 4 BASE: Performed by: ORTHOPAEDIC SURGERY

## 2023-08-10 PROCEDURE — 80048 BASIC METABOLIC PNL TOTAL CA: CPT

## 2023-08-10 PROCEDURE — 6370000000 HC RX 637 (ALT 250 FOR IP)

## 2023-08-10 PROCEDURE — 7100000000 HC PACU RECOVERY - FIRST 15 MIN: Performed by: ORTHOPAEDIC SURGERY

## 2023-08-10 DEVICE — SCREW BNE L25MM DIA4.75MM HD DIA3.5MM CORT FIX ANG: Type: IMPLANTABLE DEVICE | Site: SHOULDER | Status: FUNCTIONAL

## 2023-08-10 DEVICE — BEARING HUM STD 36 MM SHLDR VIVACIT-E PROLONG COMPHSVE: Type: IMPLANTABLE DEVICE | Site: SHOULDER | Status: FUNCTIONAL

## 2023-08-10 DEVICE — SPHERE GLEN DIA36MM REG STD CO CHROM TAPR FIT FOR COMPHSVE: Type: IMPLANTABLE DEVICE | Site: SHOULDER | Status: FUNCTIONAL

## 2023-08-10 DEVICE — IMPLANTABLE DEVICE: Type: IMPLANTABLE DEVICE | Site: SHOULDER | Status: FUNCTIONAL

## 2023-08-10 DEVICE — SCREW BNE L20MM DIA4.75MM HD DIA3.5MM CORT FIX ANG: Type: IMPLANTABLE DEVICE | Site: SHOULDER | Status: FUNCTIONAL

## 2023-08-10 DEVICE — SHOULDR S3 TOT ADV REVRS IMPL CAPPED S3: Type: IMPLANTABLE DEVICE | Site: SHOULDER | Status: FUNCTIONAL

## 2023-08-10 DEVICE — SCREW BNE L15MM DIA4.75MM HD DIA3.5MM CORT FIX ANG: Type: IMPLANTABLE DEVICE | Site: SHOULDER | Status: FUNCTIONAL

## 2023-08-10 DEVICE — SCREW BNE L20MM DIA6.5MM HEX HD DIA3.5MM FOR COMPHSVE CONV: Type: IMPLANTABLE DEVICE | Site: SHOULDER | Status: FUNCTIONAL

## 2023-08-10 RX ORDER — SODIUM CHLORIDE 9 MG/ML
INJECTION, SOLUTION INTRAVENOUS CONTINUOUS
Status: DISCONTINUED | OUTPATIENT
Start: 2023-08-10 | End: 2023-08-12 | Stop reason: HOSPADM

## 2023-08-10 RX ORDER — POTASSIUM CHLORIDE 20 MEQ/1
20 TABLET, EXTENDED RELEASE ORAL DAILY
Status: DISCONTINUED | OUTPATIENT
Start: 2023-08-10 | End: 2023-08-12 | Stop reason: HOSPADM

## 2023-08-10 RX ORDER — FAMOTIDINE 20 MG/1
20 TABLET, FILM COATED ORAL NIGHTLY
Status: DISCONTINUED | OUTPATIENT
Start: 2023-08-10 | End: 2023-08-12 | Stop reason: HOSPADM

## 2023-08-10 RX ORDER — BUDESONIDE 3 MG/1
6 CAPSULE, COATED PELLETS ORAL EVERY MORNING
Status: DISCONTINUED | OUTPATIENT
Start: 2023-08-10 | End: 2023-08-12 | Stop reason: HOSPADM

## 2023-08-10 RX ORDER — DIPHENHYDRAMINE HYDROCHLORIDE 50 MG/ML
12.5 INJECTION INTRAMUSCULAR; INTRAVENOUS
Status: DISCONTINUED | OUTPATIENT
Start: 2023-08-10 | End: 2023-08-10 | Stop reason: HOSPADM

## 2023-08-10 RX ORDER — LABETALOL HYDROCHLORIDE 5 MG/ML
5 INJECTION, SOLUTION INTRAVENOUS EVERY 10 MIN PRN
Status: DISCONTINUED | OUTPATIENT
Start: 2023-08-10 | End: 2023-08-10 | Stop reason: HOSPADM

## 2023-08-10 RX ORDER — SODIUM CHLORIDE 0.9 % (FLUSH) 0.9 %
5-40 SYRINGE (ML) INJECTION PRN
Status: DISCONTINUED | OUTPATIENT
Start: 2023-08-10 | End: 2023-08-10 | Stop reason: HOSPADM

## 2023-08-10 RX ORDER — ATORVASTATIN CALCIUM 40 MG/1
40 TABLET, FILM COATED ORAL NIGHTLY
Status: DISCONTINUED | OUTPATIENT
Start: 2023-08-10 | End: 2023-08-12 | Stop reason: HOSPADM

## 2023-08-10 RX ORDER — BUPIVACAINE HYDROCHLORIDE 5 MG/ML
INJECTION, SOLUTION EPIDURAL; INTRACAUDAL PRN
Status: DISCONTINUED | OUTPATIENT
Start: 2023-08-10 | End: 2023-08-10 | Stop reason: ALTCHOICE

## 2023-08-10 RX ORDER — SODIUM CHLORIDE 9 MG/ML
INJECTION, SOLUTION INTRAVENOUS PRN
Status: DISCONTINUED | OUTPATIENT
Start: 2023-08-10 | End: 2023-08-10 | Stop reason: HOSPADM

## 2023-08-10 RX ORDER — VANCOMYCIN HYDROCHLORIDE 1 G/20ML
INJECTION, POWDER, LYOPHILIZED, FOR SOLUTION INTRAVENOUS PRN
Status: DISCONTINUED | OUTPATIENT
Start: 2023-08-10 | End: 2023-08-10 | Stop reason: ALTCHOICE

## 2023-08-10 RX ORDER — MORPHINE SULFATE 2 MG/ML
2 INJECTION, SOLUTION INTRAMUSCULAR; INTRAVENOUS
Status: DISCONTINUED | OUTPATIENT
Start: 2023-08-10 | End: 2023-08-12 | Stop reason: HOSPADM

## 2023-08-10 RX ORDER — HYDROCODONE BITARTRATE AND ACETAMINOPHEN 5; 325 MG/1; MG/1
2 TABLET ORAL EVERY 4 HOURS PRN
Status: DISCONTINUED | OUTPATIENT
Start: 2023-08-10 | End: 2023-08-12 | Stop reason: HOSPADM

## 2023-08-10 RX ORDER — SODIUM CHLORIDE 9 MG/ML
INJECTION INTRAVENOUS PRN
Status: DISCONTINUED | OUTPATIENT
Start: 2023-08-10 | End: 2023-08-10 | Stop reason: ALTCHOICE

## 2023-08-10 RX ORDER — MORPHINE SULFATE 0.5 MG/ML
INJECTION, SOLUTION EPIDURAL; INTRATHECAL; INTRAVENOUS PRN
Status: DISCONTINUED | OUTPATIENT
Start: 2023-08-10 | End: 2023-08-10 | Stop reason: ALTCHOICE

## 2023-08-10 RX ORDER — HYDROCODONE BITARTRATE AND ACETAMINOPHEN 5; 325 MG/1; MG/1
1 TABLET ORAL EVERY 4 HOURS PRN
Status: DISCONTINUED | OUTPATIENT
Start: 2023-08-10 | End: 2023-08-12 | Stop reason: HOSPADM

## 2023-08-10 RX ORDER — FENTANYL CITRATE 50 UG/ML
INJECTION, SOLUTION INTRAMUSCULAR; INTRAVENOUS PRN
Status: DISCONTINUED | OUTPATIENT
Start: 2023-08-10 | End: 2023-08-10 | Stop reason: SDUPTHER

## 2023-08-10 RX ORDER — KETOROLAC TROMETHAMINE 30 MG/ML
INJECTION, SOLUTION INTRAMUSCULAR; INTRAVENOUS PRN
Status: DISCONTINUED | OUTPATIENT
Start: 2023-08-10 | End: 2023-08-10 | Stop reason: ALTCHOICE

## 2023-08-10 RX ORDER — LANOLIN ALCOHOL/MO/W.PET/CERES
5 CREAM (GRAM) TOPICAL NIGHTLY
Status: DISCONTINUED | OUTPATIENT
Start: 2023-08-10 | End: 2023-08-12 | Stop reason: HOSPADM

## 2023-08-10 RX ORDER — ONDANSETRON 2 MG/ML
4 INJECTION INTRAMUSCULAR; INTRAVENOUS
Status: DISCONTINUED | OUTPATIENT
Start: 2023-08-10 | End: 2023-08-10 | Stop reason: HOSPADM

## 2023-08-10 RX ORDER — AMLODIPINE BESYLATE 2.5 MG/1
2.5 TABLET ORAL
Status: DISCONTINUED | OUTPATIENT
Start: 2023-08-10 | End: 2023-08-12 | Stop reason: HOSPADM

## 2023-08-10 RX ORDER — FENTANYL CITRATE 50 UG/ML
25 INJECTION, SOLUTION INTRAMUSCULAR; INTRAVENOUS EVERY 5 MIN PRN
Status: COMPLETED | OUTPATIENT
Start: 2023-08-10 | End: 2023-08-10

## 2023-08-10 RX ORDER — ONDANSETRON 2 MG/ML
INJECTION INTRAMUSCULAR; INTRAVENOUS PRN
Status: DISCONTINUED | OUTPATIENT
Start: 2023-08-10 | End: 2023-08-10 | Stop reason: SDUPTHER

## 2023-08-10 RX ORDER — FENTANYL CITRATE 50 UG/ML
50 INJECTION, SOLUTION INTRAMUSCULAR; INTRAVENOUS EVERY 5 MIN PRN
Status: DISCONTINUED | OUTPATIENT
Start: 2023-08-10 | End: 2023-08-10 | Stop reason: HOSPADM

## 2023-08-10 RX ORDER — PANTOPRAZOLE SODIUM 40 MG/1
40 TABLET, DELAYED RELEASE ORAL
Status: DISCONTINUED | OUTPATIENT
Start: 2023-08-11 | End: 2023-08-12 | Stop reason: HOSPADM

## 2023-08-10 RX ORDER — ROCURONIUM BROMIDE 10 MG/ML
INJECTION, SOLUTION INTRAVENOUS PRN
Status: DISCONTINUED | OUTPATIENT
Start: 2023-08-10 | End: 2023-08-10 | Stop reason: SDUPTHER

## 2023-08-10 RX ORDER — PROPOFOL 10 MG/ML
INJECTION, EMULSION INTRAVENOUS PRN
Status: DISCONTINUED | OUTPATIENT
Start: 2023-08-10 | End: 2023-08-10 | Stop reason: SDUPTHER

## 2023-08-10 RX ORDER — MORPHINE SULFATE 4 MG/ML
4 INJECTION, SOLUTION INTRAMUSCULAR; INTRAVENOUS
Status: DISCONTINUED | OUTPATIENT
Start: 2023-08-10 | End: 2023-08-12 | Stop reason: HOSPADM

## 2023-08-10 RX ORDER — DEXAMETHASONE SODIUM PHOSPHATE 10 MG/ML
INJECTION, EMULSION INTRAMUSCULAR; INTRAVENOUS PRN
Status: DISCONTINUED | OUTPATIENT
Start: 2023-08-10 | End: 2023-08-10 | Stop reason: SDUPTHER

## 2023-08-10 RX ORDER — SODIUM CHLORIDE 0.9 % (FLUSH) 0.9 %
5-40 SYRINGE (ML) INJECTION EVERY 12 HOURS SCHEDULED
Status: DISCONTINUED | OUTPATIENT
Start: 2023-08-10 | End: 2023-08-10 | Stop reason: HOSPADM

## 2023-08-10 RX ORDER — QUETIAPINE FUMARATE 25 MG/1
25 TABLET, FILM COATED ORAL NIGHTLY
Status: DISCONTINUED | OUTPATIENT
Start: 2023-08-10 | End: 2023-08-12 | Stop reason: HOSPADM

## 2023-08-10 RX ORDER — MIRTAZAPINE 7.5 MG/1
7.5 TABLET, FILM COATED ORAL NIGHTLY
Status: DISCONTINUED | OUTPATIENT
Start: 2023-08-10 | End: 2023-08-12 | Stop reason: HOSPADM

## 2023-08-10 RX ORDER — SODIUM CHLORIDE 9 MG/ML
INJECTION, SOLUTION INTRAVENOUS CONTINUOUS
Status: DISCONTINUED | OUTPATIENT
Start: 2023-08-10 | End: 2023-08-10 | Stop reason: HOSPADM

## 2023-08-10 RX ORDER — LIDOCAINE HCL/PF 100 MG/5ML
SYRINGE (ML) INJECTION PRN
Status: DISCONTINUED | OUTPATIENT
Start: 2023-08-10 | End: 2023-08-10 | Stop reason: SDUPTHER

## 2023-08-10 RX ORDER — PROMETHAZINE HYDROCHLORIDE 25 MG/ML
6.25 INJECTION, SOLUTION INTRAMUSCULAR; INTRAVENOUS EVERY 6 HOURS PRN
Status: DISCONTINUED | OUTPATIENT
Start: 2023-08-10 | End: 2023-08-12 | Stop reason: HOSPADM

## 2023-08-10 RX ORDER — LABETALOL HYDROCHLORIDE 5 MG/ML
INJECTION, SOLUTION INTRAVENOUS
Status: COMPLETED
Start: 2023-08-10 | End: 2023-08-10

## 2023-08-10 RX ADMIN — Medication 4.5 MG: at 19:54

## 2023-08-10 RX ADMIN — Medication 5 MG: at 10:51

## 2023-08-10 RX ADMIN — FENTANYL CITRATE 25 MCG: 50 INJECTION, SOLUTION INTRAMUSCULAR; INTRAVENOUS at 10:45

## 2023-08-10 RX ADMIN — ROCURONIUM BROMIDE 30 MG: 10 INJECTION INTRAVENOUS at 09:17

## 2023-08-10 RX ADMIN — FENTANYL CITRATE 25 MCG: 50 INJECTION, SOLUTION INTRAMUSCULAR; INTRAVENOUS at 09:55

## 2023-08-10 RX ADMIN — ONDANSETRON 4 MG: 2 INJECTION INTRAMUSCULAR; INTRAVENOUS at 10:11

## 2023-08-10 RX ADMIN — SUGAMMADEX 200 MG: 100 INJECTION, SOLUTION INTRAVENOUS at 10:15

## 2023-08-10 RX ADMIN — SODIUM CHLORIDE: 9 INJECTION, SOLUTION INTRAVENOUS at 12:24

## 2023-08-10 RX ADMIN — POTASSIUM CHLORIDE 20 MEQ: 1500 TABLET, EXTENDED RELEASE ORAL at 14:41

## 2023-08-10 RX ADMIN — PROPOFOL 50 MG: 10 INJECTION, EMULSION INTRAVENOUS at 09:27

## 2023-08-10 RX ADMIN — Medication 100 MG: at 09:17

## 2023-08-10 RX ADMIN — FENTANYL CITRATE 25 MCG: 50 INJECTION, SOLUTION INTRAMUSCULAR; INTRAVENOUS at 10:50

## 2023-08-10 RX ADMIN — SODIUM CHLORIDE: 9 INJECTION, SOLUTION INTRAVENOUS at 08:16

## 2023-08-10 RX ADMIN — Medication 2000 MG: at 17:04

## 2023-08-10 RX ADMIN — HYDROCODONE BITARTRATE AND ACETAMINOPHEN 2 TABLET: 5; 325 TABLET ORAL at 17:00

## 2023-08-10 RX ADMIN — PROPOFOL 50 MG: 10 INJECTION, EMULSION INTRAVENOUS at 09:57

## 2023-08-10 RX ADMIN — SERTRALINE 50 MG: 50 TABLET, FILM COATED ORAL at 14:41

## 2023-08-10 RX ADMIN — AMLODIPINE BESYLATE 2.5 MG: 2.5 TABLET ORAL at 14:41

## 2023-08-10 RX ADMIN — ATORVASTATIN CALCIUM 40 MG: 40 TABLET, FILM COATED ORAL at 19:54

## 2023-08-10 RX ADMIN — FENTANYL CITRATE 50 MCG: 50 INJECTION, SOLUTION INTRAMUSCULAR; INTRAVENOUS at 09:48

## 2023-08-10 RX ADMIN — HYDROCODONE BITARTRATE AND ACETAMINOPHEN 2 TABLET: 5; 325 TABLET ORAL at 12:18

## 2023-08-10 RX ADMIN — LABETALOL HYDROCHLORIDE 5 MG: 5 INJECTION, SOLUTION INTRAVENOUS at 10:51

## 2023-08-10 RX ADMIN — DEXAMETHASONE SODIUM PHOSPHATE 8 MG: 10 INJECTION, EMULSION INTRAMUSCULAR; INTRAVENOUS at 09:44

## 2023-08-10 RX ADMIN — FENTANYL CITRATE 25 MCG: 50 INJECTION, SOLUTION INTRAMUSCULAR; INTRAVENOUS at 10:40

## 2023-08-10 RX ADMIN — FENTANYL CITRATE 25 MCG: 50 INJECTION, SOLUTION INTRAMUSCULAR; INTRAVENOUS at 10:55

## 2023-08-10 RX ADMIN — PROPOFOL 50 MG: 10 INJECTION, EMULSION INTRAVENOUS at 09:17

## 2023-08-10 RX ADMIN — QUETIAPINE FUMARATE 25 MG: 25 TABLET ORAL at 19:54

## 2023-08-10 RX ADMIN — FAMOTIDINE 20 MG: 20 TABLET, FILM COATED ORAL at 19:54

## 2023-08-10 RX ADMIN — METOPROLOL TARTRATE 25 MG: 25 TABLET, FILM COATED ORAL at 17:01

## 2023-08-10 RX ADMIN — HYDROCODONE BITARTRATE AND ACETAMINOPHEN 2 TABLET: 5; 325 TABLET ORAL at 22:43

## 2023-08-10 RX ADMIN — BUDESONIDE 6 MG: 3 CAPSULE, GELATIN COATED ORAL at 14:41

## 2023-08-10 RX ADMIN — FENTANYL CITRATE 50 MCG: 50 INJECTION, SOLUTION INTRAMUSCULAR; INTRAVENOUS at 11:00

## 2023-08-10 RX ADMIN — Medication 2000 MG: at 09:11

## 2023-08-10 RX ADMIN — FENTANYL CITRATE 25 MCG: 50 INJECTION, SOLUTION INTRAMUSCULAR; INTRAVENOUS at 09:52

## 2023-08-10 RX ADMIN — MIRTAZAPINE 7.5 MG: 7.5 TABLET ORAL at 19:54

## 2023-08-10 ASSESSMENT — PAIN DESCRIPTION - PAIN TYPE
TYPE: SURGICAL PAIN
TYPE: SURGICAL PAIN

## 2023-08-10 ASSESSMENT — PAIN DESCRIPTION - LOCATION
LOCATION: BACK
LOCATION: SHOULDER
LOCATION: NECK;SHOULDER
LOCATION: SHOULDER
LOCATION: SHOULDER

## 2023-08-10 ASSESSMENT — PAIN SCALES - GENERAL
PAINLEVEL_OUTOF10: 6
PAINLEVEL_OUTOF10: 8
PAINLEVEL_OUTOF10: 8
PAINLEVEL_OUTOF10: 9
PAINLEVEL_OUTOF10: 6
PAINLEVEL_OUTOF10: 9
PAINLEVEL_OUTOF10: 10
PAINLEVEL_OUTOF10: 6
PAINLEVEL_OUTOF10: 6
PAINLEVEL_OUTOF10: 10

## 2023-08-10 ASSESSMENT — PAIN DESCRIPTION - ONSET
ONSET: ON-GOING
ONSET: ON-GOING

## 2023-08-10 ASSESSMENT — PAIN DESCRIPTION - DESCRIPTORS
DESCRIPTORS: ACHING

## 2023-08-10 ASSESSMENT — PAIN - FUNCTIONAL ASSESSMENT
PAIN_FUNCTIONAL_ASSESSMENT: 0-10
PAIN_FUNCTIONAL_ASSESSMENT: PREVENTS OR INTERFERES WITH MANY ACTIVE NOT PASSIVE ACTIVITIES

## 2023-08-10 ASSESSMENT — PAIN DESCRIPTION - ORIENTATION
ORIENTATION: LEFT

## 2023-08-10 ASSESSMENT — PAIN DESCRIPTION - FREQUENCY
FREQUENCY: CONTINUOUS
FREQUENCY: CONTINUOUS

## 2023-08-10 NOTE — PROGRESS NOTES
1035 pt arrived to PACU, awake and crying in pain.  Dressing CDI  1040 c/o pain 6/10, medicated with 25 mcg fentanyl  1045 no change in pain status, medicated with 25 mcg fentanyl  1050 no change in pain status, medicated with 25 mcg fentanyl  1051 BP elevated, medicated with 5 mg labetalol  1055 no change in pain status, medicated with 25 mcg fentanyl  1100 c/o pain 8/10, medicated with 50 mcg fentanyl  1105 pt resting, resp easy  1115 pt resting off and on, VSS  1120 meets criteria for discharge from PACU  1134 transported to [facility] in stable condition

## 2023-08-10 NOTE — ANESTHESIA PRE PROCEDURE
Department of Anesthesiology  Preprocedure Note       Name:  Bria Wang   Age:  80 y.o.  :  1931                                          MRN:  [id number]         Date:  8/10/2023      Surgeon: Andrea Kramer):  Celestino Griffin MD    Procedure: Procedure(s):  Revision Left Shoulder Replacement    Medications prior to admission:   Prior to Admission medications    Medication Sig Start Date End Date Taking? Authorizing Provider   budesonide (ENTOCORT EC) 3 MG delayed release capsule Take 2 capsules by mouth every morning   Yes Historical Provider, MD   potassium chloride (KLOR-CON M) 20 MEQ extended release tablet Take 1 tablet by mouth daily   Yes Historical Provider, MD   melatonin 5 MG TABS tablet Take 1 tablet by mouth daily   Yes Historical Provider, MD   sertraline (ZOLOFT) 50 MG tablet Take 1 tablet by mouth daily   Yes Historical Provider, MD   lipase-protease-amylase (ZENPEP) 35726-69412 units CPEP delayed release capsule Take by mouth 3 times daily (with meals)   Yes Historical Provider, MD   Menthol, Topical Analgesic, (BIOFREEZE) 10 % CREA Apply topically every 2 hours as needed Left shoulder   Yes Historical Provider, MD   Benzocaine-Menthol (CEPACOL) 6-10 MG LOZG lozenge Take 1 lozenge by mouth every 2 hours as needed for Sore Throat   Yes Historical Provider, MD   diclofenac sodium (VOLTAREN) 1 % GEL Apply topically every 6 hours as needed for Pain Left shoulder   Yes Historical Provider, MD   loperamide (IMODIUM) 2 MG capsule Take by mouth 2 tab daily if needed for loose stools, may give 1 tab after each loose bowel movement after first 4 mg dose given.  Not to exceed 8 mg in 24 hours   Yes Historical Provider, MD   aluminum & magnesium hydroxide-simethicone (MAALOX) 200-200-20 MG/5ML SUSP suspension Take 10 mLs by mouth every 3 hours as needed for Indigestion Swish and spit for sore tongue   Yes Historical Provider, MD   traMADol (ULTRAM) 50 MG tablet Take 1 tablet by mouth every 6 hours as

## 2023-08-10 NOTE — H&P
History and Physical Update    Pt Name: Carlos Phipps  MRN: [id number]  YOB: 1931  Date of evaluation: 8/10/2023    [x] I have examined the patient and reviewed the H&P/Consult and there are no changes to the patient or plans.     [] I have examined the patient and reviewed the H&P/Consult and have noted the following changes:        Daisy Cohn MD   Electronically signed 8/10/2023 at 8:15 AM

## 2023-08-10 NOTE — PROGRESS NOTES
Patient oriented to Same Day department and admitted to Same Day Surgery room 7. Patient verbalized approval for first name, last initial with physician name on unit whiteboard. Plan of care reviewed with patient. Patient room whiteboard filled out and discussed with patient and responsible adult. Patient and responsible adult offered Same Day Welcome Packet to review. Call light in reach. Bed in lowest position, locked, with one bed rail up. SCDs and warming blanket in place. Appropriate arm bands on patient. Bathroom offered. All questions and concerns of patient addressed. Meds to Beds:   Patient informed of [facility] Med[facility] program during admission.  Patient is agreeable to program.   Contact information for the pharmacy and the Meds to [facility] program:   Name: Tegan Langfords   Relationship to patient:child   Phone number: [phone]

## 2023-08-10 NOTE — ANESTHESIA POSTPROCEDURE EVALUATION
Department of Anesthesiology  Postprocedure Note    Patient: Carlos Phipps  MRN: [id number]  YOB: 1931  Date of evaluation: 8/10/2023      Procedure Summary     Date: 08/10/23 Room / Location: [address]    Anesthesia Start: 0910 Anesthesia Stop: 6505    Procedure: Revision Left Shoulder Replacement (Left) Diagnosis:       Presence of artificial shoulder joint, left      (Presence of artificial shoulder joint, left [Z96.612])    Surgeons: Daisy Cohn MD Responsible Provider: Misael Harper DO    Anesthesia Type: general ASA Status: 3          Anesthesia Type: No value filed.     Sean Phase I: Sean Score: 10    Sean Phase II:        Anesthesia Post Evaluation    Patient location during evaluation: PACU  Patient participation: complete - patient participated  Level of consciousness: awake and alert  Airway patency: patent  Nausea & Vomiting: no vomiting and no nausea  Complications: no  Cardiovascular status: hemodynamically stable  Respiratory status: acceptable and nasal cannula  Hydration status: stable  Pain management: adequate
Awake/Alert

## 2023-08-10 NOTE — OP NOTE
Operative Note      Patient: Mary Siegel  YOB: 1931  MRN: [id number]    Date of Procedure: 8/10/2023    Pre-Op diagnosis: Failed left shoulder hemiarthroplasty with dislocation    Post-Op Diagnosis: Same       Procedure(s):  Revision Left Shoulder Replacement    Surgeon(s):  Pretty Soria MD    Assistant:   Physician Assistant: Amada Clark PA-C; Dieudonne Maddox PA-C    The physician assistant was present for the entirety of the procedure and vital for the performance of the procedure. He assisted with positioning, prepping, draping of the patient before the procedure and instrument manipulation, extremity repositioning  as well as wound closure, dressing application after the procedure. Please note that no intern, resident, or other hospital staff was available to assist during the surgery     Anesthesia: General    Estimated Blood Loss (mL): less than 019     Complications: None    Specimens:   * No specimens in log *    Implants:  Implant Name Type Inv. Item Serial No.  Lot No. LRB No. Used Action   SCREW BNE L15MM DIA4. 75MM HD DIA3.5MM FÁTIMA FIX ANG - [id number]  SCREW BNE L15MM DIA4. 75MM HD DIA3.5MM FÁTIMA FIX ANG  MICHELLE [employer] LLCET [facility] [id number] Left 1 Implanted   SCREW BNE L15MM DIA4. 75MM HD DIA3.5MM FÁTIMA FIX ANG - [id number]  SCREW BNE L15MM DIA4. 75MM HD DIA3.5MM FÁTIMA FIX ANG  MICHELLE [employer] LLCET [facility] [id number] Left 1 Implanted   SCREW BNE L25MM DIA4. 75MM HD DIA3.5MM FÁTIMA FIX ANG - [id number]  SCREW BNE L25MM DIA4. 75MM HD DIA3.5MM FÁTIMA FIX ANG  MICHELLE [employer] LLCET [facility] [id number] Left 1 Implanted   BASEPLATE DEBORAH [id number] MINI SHLDR REV TAPR COMPHSVE - [id number]  BASEPLATE DEBORAH [id number] MINI SHLDR REV TAPR COMPHSVE  MICHELLE [employer] [facility] [id number] Left 1 Implanted   SCREW BNE L20MM DIA6. 5MM HEX HD DIA3. 5MM FOR COMPHSVE CONV - [id number]  SCREW BNE L20MM DIA6. 5MM HEX HD DIA3. 5MM FOR COMPHSVE CONV  MICHELLE [employer] [facility] [id number] Left 1 Implanted standard tray. It was tight and was not easy to dislocate. Like to go with that. Implanted the glenosphere and standard baseplate. Placed it is through its range of motion which was limited secondary to stiffness. Felt be appropriate. Repaired the deltoid back to the acromion and the rent in the deltoid. Skin was repaired with 0 Quill suture and Prineo. Patient will be taken recovery once awake. Postop plan  She will be weightbearing as tolerated. Dry dressings as necessary.   First postoperative visit will be in 8 weeks for clinical exam.    Electronically signed by Shane Whittaker MD on 8/10/2023 at 10:11 AM

## 2023-08-10 NOTE — DISCHARGE INSTRUCTIONS
Aidan Chamberlain MD       ACTIVITY / WEIGHTBEARING  INSTRUCTIONS:  Weightbearing as tolerated surgical extremity. DIET:  Increase Fluid/Water intake, eat foods high in fiber, fruits and vegetables to help to prevent constipation. WOUND/DRESSING INSTRUCTIONS:  Always ensure you wash your hands before and after caring for your wound/dressing. Keep your dressing clean and dry. Change dressing as needed. Can wash around incision. If prineo (mesh tape dressing) in place, this may be removed after 3 weeks. You may shower with prineo dressing if no active drainage coming from incision. Do not let shower water directly hit the incision. Dry completely. Do not place antibiotic ointment, lotion, creams on surgical incision. Smoking impairs adequate wound healing. If smoking, consider quitting. May apply ice to surgical incision to help to prevent swelling. MEDICATION INSTRUCTIONS:  Take pain medication as prescribed. See orders regarding resuming your home medications and any new medications. Continue blood thinner if ordered by your physician. FOLLOW-UP CARE:  If a follow-up appointment was not made for you at discharge, call [phone] [facility] office) or [phone] [facility] office) to schedule an appointment for 8 weeks. Call Dr Ruben Mo office with any concerns. Signs of infection such as fever, chills, redness, pus, or bad smelling discharge from incision site. Excessive bleeding, swelling, increasing pain, or discharge around incision site. The stitches or staples come apart at the incision site. Cough shortness of breath, or chest pain. Severe nausea or vomiting. Pain that you cannot control with the medications you have been given or  pain becomes worse. Numbness, tingling, or loss of feeling in your leg, knee, or foot. Pain, burning, urgency, or frequency of urination.       If a medical emergerncy, please call [phone]

## 2023-08-11 LAB
ANION GAP SERPL CALC-SCNC: 14 MEQ/L (ref 8–16)
BASOPHILS ABSOLUTE: 0 THOU/MM3 (ref 0–0.1)
BASOPHILS NFR BLD AUTO: 0.2 %
BUN SERPL-MCNC: 23 MG/DL (ref 7–22)
CALCIUM SERPL-MCNC: 9.1 MG/DL (ref 8.5–10.5)
CHLORIDE SERPL-SCNC: 109 MEQ/L (ref 98–111)
CO2 SERPL-SCNC: 16 MEQ/L (ref 23–33)
CREAT SERPL-MCNC: 1.2 MG/DL (ref 0.4–1.2)
DEPRECATED RDW RBC AUTO: 60 FL (ref 35–45)
EOSINOPHIL NFR BLD AUTO: 0 %
EOSINOPHILS ABSOLUTE: 0 THOU/MM3 (ref 0–0.4)
ERYTHROCYTE [DISTWIDTH] IN BLOOD BY AUTOMATED COUNT: 16.2 % (ref 11.5–14.5)
GFR SERPL CREATININE-BSD FRML MDRD: 42 ML/MIN/1.73M2
GLUCOSE SERPL-MCNC: 113 MG/DL (ref 70–108)
HCT VFR BLD AUTO: 31.3 % (ref 37–47)
HGB BLD-MCNC: 9.3 GM/DL (ref 12–16)
IMM GRANULOCYTES # BLD AUTO: 0.06 THOU/MM3 (ref 0–0.07)
IMM GRANULOCYTES NFR BLD AUTO: 0.6 %
LYMPHOCYTES ABSOLUTE: 1.7 THOU/MM3 (ref 1–4.8)
LYMPHOCYTES NFR BLD AUTO: 17.4 %
MCH RBC QN AUTO: 29.5 PG (ref 26–33)
MCHC RBC AUTO-ENTMCNC: 29.7 GM/DL (ref 32.2–35.5)
MCV RBC AUTO: 99.4 FL (ref 81–99)
MONOCYTES ABSOLUTE: 0.6 THOU/MM3 (ref 0.4–1.3)
MONOCYTES NFR BLD AUTO: 6.4 %
NEUTROPHILS NFR BLD AUTO: 75.4 %
NRBC BLD AUTO-RTO: 0 /100 WBC
PLATELET # BLD AUTO: 251 THOU/MM3 (ref 130–400)
PMV BLD AUTO: 8.3 FL (ref 9.4–12.4)
POTASSIUM SERPL-SCNC: 4.5 MEQ/L (ref 3.5–5.2)
RBC # BLD AUTO: 3.15 MILL/MM3 (ref 4.2–5.4)
SEGMENTED NEUTROPHILS ABSOLUTE COUNT: 7.2 THOU/MM3 (ref 1.8–7.7)
SODIUM SERPL-SCNC: 139 MEQ/L (ref 135–145)
WBC # BLD AUTO: 9.6 THOU/MM3 (ref 4.8–10.8)

## 2023-08-11 PROCEDURE — 97162 PT EVAL MOD COMPLEX 30 MIN: CPT

## 2023-08-11 PROCEDURE — 6370000000 HC RX 637 (ALT 250 FOR IP)

## 2023-08-11 PROCEDURE — 99232 SBSQ HOSP IP/OBS MODERATE 35: CPT | Performed by: NURSE PRACTITIONER

## 2023-08-11 PROCEDURE — 80048 BASIC METABOLIC PNL TOTAL CA: CPT

## 2023-08-11 PROCEDURE — 85025 COMPLETE CBC W/AUTO DIFF WBC: CPT

## 2023-08-11 PROCEDURE — 97530 THERAPEUTIC ACTIVITIES: CPT

## 2023-08-11 PROCEDURE — 97535 SELF CARE MNGMENT TRAINING: CPT

## 2023-08-11 PROCEDURE — 6370000000 HC RX 637 (ALT 250 FOR IP): Performed by: NURSE PRACTITIONER

## 2023-08-11 PROCEDURE — 36415 COLL VENOUS BLD VENIPUNCTURE: CPT

## 2023-08-11 PROCEDURE — 97166 OT EVAL MOD COMPLEX 45 MIN: CPT

## 2023-08-11 PROCEDURE — 97116 GAIT TRAINING THERAPY: CPT

## 2023-08-11 RX ORDER — ASPIRIN 325 MG
325 TABLET ORAL DAILY
Status: DISCONTINUED | OUTPATIENT
Start: 2023-08-12 | End: 2023-08-12 | Stop reason: HOSPADM

## 2023-08-11 RX ORDER — ASPIRIN 325 MG
325 TABLET ORAL DAILY
Qty: 30 TABLET | Refills: 0 | Status: SHIPPED | OUTPATIENT
Start: 2023-08-12 | End: 2023-09-11

## 2023-08-11 RX ORDER — TRAMADOL HYDROCHLORIDE 50 MG/1
50 TABLET ORAL EVERY 8 HOURS PRN
Qty: 21 TABLET | Refills: 0 | Status: SHIPPED | OUTPATIENT
Start: 2023-08-11 | End: 2023-08-18

## 2023-08-11 RX ORDER — TRAMADOL HYDROCHLORIDE 50 MG/1
50 TABLET ORAL EVERY 8 HOURS PRN
Qty: 21 TABLET | Refills: 0 | Status: SHIPPED | OUTPATIENT
Start: 2023-08-11 | End: 2023-08-11 | Stop reason: SDUPTHER

## 2023-08-11 RX ADMIN — METOPROLOL TARTRATE 25 MG: 25 TABLET, FILM COATED ORAL at 09:56

## 2023-08-11 RX ADMIN — ATORVASTATIN CALCIUM 40 MG: 40 TABLET, FILM COATED ORAL at 20:24

## 2023-08-11 RX ADMIN — METOPROLOL TARTRATE 25 MG: 25 TABLET, FILM COATED ORAL at 20:24

## 2023-08-11 RX ADMIN — HYDROCODONE BITARTRATE AND ACETAMINOPHEN 2 TABLET: 5; 325 TABLET ORAL at 18:54

## 2023-08-11 RX ADMIN — SERTRALINE 50 MG: 50 TABLET, FILM COATED ORAL at 09:58

## 2023-08-11 RX ADMIN — MIRTAZAPINE 7.5 MG: 7.5 TABLET ORAL at 20:24

## 2023-08-11 RX ADMIN — POTASSIUM CHLORIDE 20 MEQ: 1500 TABLET, EXTENDED RELEASE ORAL at 09:56

## 2023-08-11 RX ADMIN — QUETIAPINE FUMARATE 25 MG: 25 TABLET ORAL at 20:25

## 2023-08-11 RX ADMIN — Medication 4.5 MG: at 20:25

## 2023-08-11 RX ADMIN — FAMOTIDINE 20 MG: 20 TABLET, FILM COATED ORAL at 20:24

## 2023-08-11 RX ADMIN — HYDROCODONE BITARTRATE AND ACETAMINOPHEN 2 TABLET: 5; 325 TABLET ORAL at 09:55

## 2023-08-11 RX ADMIN — BUDESONIDE 6 MG: 3 CAPSULE, GELATIN COATED ORAL at 09:55

## 2023-08-11 RX ADMIN — AMLODIPINE BESYLATE 2.5 MG: 2.5 TABLET ORAL at 13:10

## 2023-08-11 ASSESSMENT — PAIN SCALES - GENERAL
PAINLEVEL_OUTOF10: 4
PAINLEVEL_OUTOF10: 7
PAINLEVEL_OUTOF10: 8
PAINLEVEL_OUTOF10: 7
PAINLEVEL_OUTOF10: 0
PAINLEVEL_OUTOF10: 7
PAINLEVEL_OUTOF10: 7

## 2023-08-11 ASSESSMENT — PAIN DESCRIPTION - DESCRIPTORS
DESCRIPTORS: ACHING;DISCOMFORT
DESCRIPTORS: ACHING;DISCOMFORT;THROBBING
DESCRIPTORS: ACHING;DISCOMFORT;SORE

## 2023-08-11 ASSESSMENT — PAIN DESCRIPTION - PAIN TYPE: TYPE: SURGICAL PAIN

## 2023-08-11 ASSESSMENT — PAIN DESCRIPTION - LOCATION
LOCATION: SHOULDER
LOCATION: SHOULDER

## 2023-08-11 ASSESSMENT — PAIN DESCRIPTION - ORIENTATION: ORIENTATION: RIGHT;LEFT

## 2023-08-11 ASSESSMENT — PAIN - FUNCTIONAL ASSESSMENT: PAIN_FUNCTIONAL_ASSESSMENT: PREVENTS OR INTERFERES SOME ACTIVE ACTIVITIES AND ADLS

## 2023-08-11 NOTE — DISCHARGE SUMMARY
Physician Discharge Summary     Patient ID:  Hans Capone  [id number]  62 y.o.  6/21/1931    Admit date: 8/10/2023    Discharge date and time: 8/12/23    Admitting Physician: Alejandro Atkinson MD     Discharge Physician: Alejandro Atkinson MD     Admission Diagnoses: Presence of artificial shoulder joint, left [Z96.612]    Discharge Diagnoses: Presence of artificial shoulder joint, left [Z96.612]    Admission Condition: good    Discharged Condition: good    Indication for Admission: unstable orthopaedic injury    Hospital Course: Patient is now s/p revision L TSA on 8/10/23 by Dr. Leann Hernandez. On the day of surgery, patient was identified in the pre-operative holding area and agreeable to proceed with surgery. Written consent was obtained. Please see operative note for further details of this procedure. Patient received connie-operative antibiotics. Patient recovered in PACU before transfer to a regular nursing floor. Patient was started on tylenol and norco for pain control and ASA for dvt prophylaxis. Hospitalist was consulted for assistance of medical management as well. On the day of discharge, patient was afebrile with stable vital signs, stable upon physical exam. Patient was discharged with prescriptions for pain and dvt ppx. Patient was deemed stable for discharge to ECF as recommended by PT/OT. Patient will follow up with Dr Leann Hernandez in 8 weeks for post-operative visit. Consults: hospitalist     Discharge Exam:  Please see final progress note for appropriate discharge exam    Disposition: SNF    In process/preliminary results:  Outstanding Order Results       No orders found for last 30 day(s).             Patient Instructions:   Current Discharge Medication List        START taking these medications    Details   aspirin 325 MG tablet Take 1 tablet by mouth daily for 30 doses  Qty: 30 tablet, Refills: 0           CONTINUE these medications which have CHANGED    Details   traMADol (ULTRAM) 50 MG tablet Take 1 tablet

## 2023-08-11 NOTE — PLAN OF CARE
Problem: Discharge Planning  Goal: Discharge to home or other facility with appropriate resources  8/10/2023 2156 by Katelin Adams RN  Outcome: Progressing  Flowsheets (Taken 8/10/2023 2156)  Discharge to home or other facility with appropriate resources:   Identify barriers to discharge with patient and caregiver   Arrange for needed discharge resources and transportation as appropriate   Identify discharge learning needs (meds, wound care, etc)  Note: From [facility]. DCP pending. Problem: Pain  Goal: Verbalizes/displays adequate comfort level or baseline comfort level  8/10/2023 2156 by Katelin Adams RN  Outcome: Progressing  Flowsheets (Taken 8/10/2023 2156)  Verbalizes/displays adequate comfort level or baseline comfort level:   Encourage patient to monitor pain and request assistance   Assess pain using appropriate pain scale   Administer analgesics based on type and severity of pain and evaluate response     Problem: Safety - Adult  Goal: Free from fall injury  8/10/2023 2156 by Katelin Adams RN  Outcome: Progressing  Note: Safe environment maintained. Bedside table & call light in reach. Uses call light appropriately when needing assistance. Problem: Skin/Tissue Integrity  Goal: Absence of new skin breakdown  Description: 1. Monitor for areas of redness and/or skin breakdown  2. Assess vascular access sites hourly  3. Every 4-6 hours minimum:  Change oxygen saturation probe site  4. Every 4-6 hours:  If on nasal continuous positive airway pressure, respiratory therapy assess nares and determine need for appliance change or resting period. 8/10/2023 2156 by Katelin Adams RN  Outcome: Progressing  Note: No new skin breakdown noted. Encouraged patient to reposition in bed. Care plan reviewed with patient. Patient  verbalize understanding of the plan of care and contribute to goal setting.

## 2023-08-11 NOTE — DISCHARGE INSTR - COC
Continuity of Care Form    Patient Name: Faustino Cruz   :  1931  MRN:  [id number]    Admit date:  8/10/2023  Discharge date:  ***    Code Status Order: Limited   Advance Directives:   [address] [facility] Documentation       Date/Time Healthcare Directive Type of Healthcare Directive Copy in [address] Agent's Name Healthcare Agent's Phone Number    08/10/23 5987 Other (Comment)  DNR -- -- -- -- --            Admitting Physician:  Cynthia Velasquez MD  PCP: Lizzette Soriano DO    Discharging Nurse: [facility] Unit/Room#: 7K-21/021-A  Discharging Unit Phone Number: ***    Emergency Contact:   Extended Emergency Contact Information  Primary Emergency Contact: Davida Hawley  Address:     [place] [facility] [place] [address] [facility] Phone: [phone]  Mobile Phone: [phone]  Relation: Child  Secondary Emergency Contact: [address] [facility] Phone: [phone]  Mobile Phone: [phone]  Relation: Child  Preferred language: English    Past Surgical History:  Past Surgical History:   Procedure Laterality Date    APPENDECTOMY      CHOLECYSTECTOMY      COLONOSCOPY      EYE SURGERY      HERNIA REPAIR      HIP SURGERY Right 2021    RIGHT INTERTAN performed by Cynthia Velasquez MD at [address] [facility])      SHOULDER ARTHROPLASTY Bilateral 2023    BILATERAL SHOULDER  ARTHROPLASTY performed by Cynthia Velasquez MD at [address] [facility]         Immunization History:   Immunization History   Administered Date(s) Administered    COVID-19, J&J, (age 18y+), IM, 0.5 mL 2021    Influenza Vaccine, unspecified formulation 2016    Influenza, High Dose (Fluzone 65 yrs and older) 10/19/2017       Active Problems:  Patient Active Problem List   Diagnosis Code    Partial seizure ([address]) R56.9    CKD (chronic kidney disease) stage 3, GFR 30-59 ml/min ([facility]) N18.30    Benign

## 2023-08-11 NOTE — CARE COORDINATION
DISCHARGE PLANNING EVALUATION: OP/OBSERVATION        8/11/23, 8:44 AM EDT    Bria Wang       Admitted from: OR 8/10/2023/ 0603   Location: [facility]21/021-A Reason for admit: Presence of artificial shoulder joint, left [Z96.612]     Procedure:   8/10: Revision Left Shoulder Replacement- Dr. Dandre Shea. Pertinent Info/Orders/Treatment Plan:  Hospitalist and ortho. Dry dressing PRN; Weight Bearing as tolerated. Ice therapy. PT/OT. 0.9@ 100; norvasc; entocort; pepcid; prn norco; lopressor; remeron; protonix; klor-con; zoloft    PCP: Juaquin Jerry DO    Patient Goals/Plan/Treatment Preferences: Plan to return to [facility]. SW following   Transportation/Food Security/Housekeeping Addressed:  No issues identified.

## 2023-08-11 NOTE — PROGRESS NOTES
Hospitalist Progress Note    Patient:  Camille Wiggins      Unit/Bed:7K-21/021-A    YOB: 1931    MRN: [id number]       Acct: [de-identified]     PCP: Domenica Jerry DO    Date of Admission: 8/10/2023    Assessment/Plan:    Primary hypertension, uncontrolled--Norvasc, Lopressor; monitor  Metabolic acidosis--monitor; has 0.9 normal saline at 100 mL an hour  Hypokalemia--resolved  Acute on chronic normocytic anemia--likely secondary to postop status as down 2 g however on June 16, 2023 hemoglobin was noted to be 8.9, questioning some hemodilution  CKD stage III  Chronic diastolic heart failure--on Lopressor  Dementia  Hyperlipidemia--statin  GERD--PPI  Status post revision left shoulder replacement on 8/10/2023--per orthopedic  CODE STATUS--limited x 4      Expected discharge date: Per orthopedic    Disposition:    [x] Home       [] TCU       [] Rehab       [] Psych       [] SNF       [] [address]       [] Other-    Chief Complaint: Medical management    Hospital Course: Per consult note done 8/11/2023: Camille Wiggins 80 y.o. female who we are asked to see/evaluate by Destinee Medina MD for medical management. Patient is post-op from scheduled left shoulder replacement. Patient reports she is nauseated following surgery. Initially she was craving some ice cream and tried to eat some sherbet ice cream and is now nauseated. Reports she will become nauseated at home as well on occasion. She otherwise states her pain in her shoulder is controlled. Endorses some chills, denies fevers. Denies chest pain, shortness of breath, abdominal pain, vomiting, and leg swelling. \"    8/11--> hemodynamically stable, afebrile and on room air; CO2 was low, patient does have normal saline infusing    Subjective (past 24 hours): Family at bedside, offers no complaints at this time besides some pain in her left shoulder    Medications:  Reviewed    Infusion Medications    sodium chloride 100 mL/hr

## 2023-08-11 NOTE — PROGRESS NOTES
[facility] OCCUPATIONAL THERAPY  [facility] ORTHOPEDICS 7K  EVALUATION    Time:   Time In: 1030  Time Out: 1118  Timed Code Treatment Minutes: 45 Minutes  Minutes: 48          Date: 2023  Patient Name: Kaila Oleary,   Gender: female      MRN: [id number]  : 1931  (80 y.o.)  Referring Practitioner: Kana Alvarenga, APRN - CNP  Diagnosis: presence of artificial shoulder joint, left  Additional Pertinent Hx: 80-year-old femalewith significant hx for bilateral proximal humerus fractures with dislocation. Pt underwent bilateral hemiarthroplasty shoulders. left side has had problems with dislocation. Pt underwent L total shoulder revision with Dr. Jyoti Handy on 8/10. Restrictions/Precautions:  Restrictions/Precautions: Up as Tolerated, Fall Risk, Weight Bearing  Left Lower Extremity Weight Bearing: Weight Bearing As Tolerated  Position Activity Restriction  Other position/activity restrictions: sling for comfort    Subjective  Chart Reviewed: Yes, Orders, Progress Notes    Subjective: Nurse approved OT eval. Pt in bed on OT arrival, pleasant and cooperative with family at bedside and supportive. Pt is A+Ox4. Pain: does not report pain     Vitals: Blood Pressure: 155/83 after returning to sitting from going to the bathroom. (Pt c.o feeling lightheaded on the way back from bathroom). Pt instructed to stand up again after resting and BP read 138/77. Social/Functional History:  Lives With: Other (comment)  Type of Home: Facility (Prior to 2023 pt was living at [facility]. Since her fall leading to B UE fracture.  Since then she has been in skilled nursing)              Additional Comments: Pt is in therapy at the SNF and reports she has been ambulating without AD with staff assist.     VISION:WFL    HEARING:   [place]    COGNITION: Slow Processing, Impaired Memory, Decreased Problem Solving, and Decreased Safety Awareness    RANGE OF MOTION:  Right Upper Extremity: Impaired - extremely for medical complication as a result of reduce mobility and inability to return to prior level of living. Performance deficits / Impairments: Decreased functional mobility , Decreased ADL status, Decreased strength, Decreased safe awareness, Decreased endurance  Prognosis: Fair  REQUIRES OT FOLLOW-UP: Yes  Decision Making: Medium Complexity    Treatment Initiated: Treatment and education initiated within context of evaluation. Evaluation time included review of current medical information, gathering information related to past medical, social and functional history, completion of standardized testing, formal and informal observation of tasks, assessment of data and development of plan of care and goals. Treatment time included skilled education and facilitation of tasks to increase safety and independence with ADL's for improved functional independence and quality of life. Discharge Recommendations:  Subacute/Skilled Nursing Facility    Patient Education:     Patient Education  Education Given To: Patient, Family  Education Provided: Role of Therapy, Plan of Care, Precautions, Fall Prevention Strategies  Education Method: Demonstration  Barriers to Learning: None  Education Outcome: Verbalized understanding, Demonstrated understanding    Equipment Recommendations:  Equipment Needed: No  Other: defer to next level of care    Plan:  Times Per Week: 5x  Times Per Day: Once a day  Current Treatment Recommendations: Strengthening, Balance training, Functional mobility training, Endurance training, Neuromuscular re-education, Safety education & training, Self-Care / ADL. See long-term goal time frame for expected duration of plan of care. If no long-term goals established, a short length of stay is anticipated. Goals:  Patient goals :  To be able to walk and care for self  Short Term Goals  Time Frame for Short Term Goals: until discharge  Short Term Goal 1: Pt will navigate to/from bathroom and HH distances

## 2023-08-11 NOTE — PROGRESS NOTES
0142 Patient's IV leaking; attempted to insert another but patient refused. Will try later in the morning. 1117 patient is still refusing; will defer to dayshift    Patient refused morning Protonix. I asked why she was refusing; she does not know. Education given but still refusing. Oriented x3. She had Norco at 2243; Earlier in the shift she thinks she is [facility]. Care ongoing.

## 2023-08-11 NOTE — PROGRESS NOTES
[facility] - 7K-/021-A    Time In: 2966  Time Out: 2626  Timed Code Treatment Minutes: 13 Minutes  Minutes: 23          Date: 2023  Patient Name: Arpita Jenkins,  Gender:  female        MRN: [id number]  : 1931  (80 y.o.)      Referring Practitioner: JEFFERY Celeste CNP  Diagnosis: Presence of artificial shoulder joint, left  Additional Pertinent Hx: Per 8/10: Failed left shoulder hemiarthroplasty with dislocation pt s/p Revision Left Shoulder Replacement DOS 8/10. Restrictions/Precautions:  Restrictions/Precautions: Fall Risk, Weight Bearing, General Precautions  Left Upper Extremity Weight Bearing: Weight Bearing As Tolerated  Position Activity Restriction  Other position/activity restrictions: sling for comfort, watch short stature with sitting EOB    Subjective:  Chart Reviewed: Yes  Patient assessed for rehabilitation services?: Yes  Family / Caregiver Present: No  Subjective: RN approved session. Pt pleasantly agrees for session. Her children were present and supportive. Education provided in her high fall risk and need for AD. We also reviewed her WB status and sling PRN per ortho. General:  Overall Orientation Status: Within Functional Limits  Vision: Within Functional Limits  Hearing: Exceptions to [facility]  Hearing Exceptions: Hard of hearing/hearing concerns       Pain: yes, sore shoulder, ice applied end of session    Vitals: Vitals not assessed per clinical judgement, see nursing flowsheet    Social/Functional History:    Lives With: Other (comment)  Type of Home: Facility (Prior to 2023 pt was living at [facility]. Since her fall leading to B UE fracture.  Since then she has been in skilled nursing)                   Ambulation Assistance: Needs assistance  Transfer Assistance: Needs assistance          Additional Comments: Pt is in therapy at the SNF and reports she has been ambulating without

## 2023-08-11 NOTE — CARE COORDINATION
8/11/23, 7:35 AM EDT  Discharge Planning Evaluation  Social work consult received, patient from [facility]. Patient/Family preference is to return to [facility]. Confirmed with patient and family. The patient's current payor source at the facility is medicare . Medicare skilled days available: yes  Insurance precert:  no  Spoke with admissions at the facility.   Patient bed hold: yes  Anticipated transport plan: undetermined  Patient's Healthcare Decision Maker: Named in [facility]

## 2023-08-12 VITALS
BODY MASS INDEX: 23.73 KG/M2 | TEMPERATURE: 98.1 F | HEART RATE: 65 BPM | SYSTOLIC BLOOD PRESSURE: 130 MMHG | RESPIRATION RATE: 17 BRPM | WEIGHT: 110 LBS | DIASTOLIC BLOOD PRESSURE: 60 MMHG | OXYGEN SATURATION: 100 % | HEIGHT: 57 IN

## 2023-08-12 LAB
ANION GAP SERPL CALC-SCNC: 11 MEQ/L (ref 8–16)
BUN SERPL-MCNC: 28 MG/DL (ref 7–22)
CALCIUM SERPL-MCNC: 9 MG/DL (ref 8.5–10.5)
CHLORIDE SERPL-SCNC: 111 MEQ/L (ref 98–111)
CO2 SERPL-SCNC: 16 MEQ/L (ref 23–33)
CREAT SERPL-MCNC: 1.3 MG/DL (ref 0.4–1.2)
DEPRECATED RDW RBC AUTO: 58.7 FL (ref 35–45)
ERYTHROCYTE [DISTWIDTH] IN BLOOD BY AUTOMATED COUNT: 16.6 % (ref 11.5–14.5)
GFR SERPL CREATININE-BSD FRML MDRD: 39 ML/MIN/1.73M2
GLUCOSE SERPL-MCNC: 83 MG/DL (ref 70–108)
HCT VFR BLD AUTO: 29.3 % (ref 37–47)
HGB BLD-MCNC: 9 GM/DL (ref 12–16)
MCH RBC QN AUTO: 29.7 PG (ref 26–33)
MCHC RBC AUTO-ENTMCNC: 30.7 GM/DL (ref 32.2–35.5)
MCV RBC AUTO: 96.7 FL (ref 81–99)
PLATELET # BLD AUTO: 214 THOU/MM3 (ref 130–400)
PMV BLD AUTO: 8.6 FL (ref 9.4–12.4)
POTASSIUM SERPL-SCNC: 4.4 MEQ/L (ref 3.5–5.2)
RBC # BLD AUTO: 3.03 MILL/MM3 (ref 4.2–5.4)
SODIUM SERPL-SCNC: 138 MEQ/L (ref 135–145)
WBC # BLD AUTO: 8.4 THOU/MM3 (ref 4.8–10.8)

## 2023-08-12 PROCEDURE — 6370000000 HC RX 637 (ALT 250 FOR IP): Performed by: PHYSICIAN ASSISTANT

## 2023-08-12 PROCEDURE — 36415 COLL VENOUS BLD VENIPUNCTURE: CPT

## 2023-08-12 PROCEDURE — 85027 COMPLETE CBC AUTOMATED: CPT

## 2023-08-12 PROCEDURE — 6370000000 HC RX 637 (ALT 250 FOR IP)

## 2023-08-12 PROCEDURE — 6370000000 HC RX 637 (ALT 250 FOR IP): Performed by: NURSE PRACTITIONER

## 2023-08-12 PROCEDURE — 80048 BASIC METABOLIC PNL TOTAL CA: CPT

## 2023-08-12 RX ADMIN — SERTRALINE 50 MG: 50 TABLET, FILM COATED ORAL at 08:01

## 2023-08-12 RX ADMIN — HYDROCODONE BITARTRATE AND ACETAMINOPHEN 1 TABLET: 5; 325 TABLET ORAL at 12:11

## 2023-08-12 RX ADMIN — POTASSIUM CHLORIDE 20 MEQ: 1500 TABLET, EXTENDED RELEASE ORAL at 08:00

## 2023-08-12 RX ADMIN — METOPROLOL TARTRATE 25 MG: 25 TABLET, FILM COATED ORAL at 08:01

## 2023-08-12 RX ADMIN — ASPIRIN 325 MG: 325 TABLET ORAL at 08:00

## 2023-08-12 RX ADMIN — HYDROCODONE BITARTRATE AND ACETAMINOPHEN 2 TABLET: 5; 325 TABLET ORAL at 06:18

## 2023-08-12 RX ADMIN — PANTOPRAZOLE SODIUM 40 MG: 40 TABLET, DELAYED RELEASE ORAL at 06:05

## 2023-08-12 RX ADMIN — BUDESONIDE 6 MG: 3 CAPSULE, GELATIN COATED ORAL at 08:01

## 2023-08-12 ASSESSMENT — PAIN - FUNCTIONAL ASSESSMENT: PAIN_FUNCTIONAL_ASSESSMENT: PREVENTS OR INTERFERES SOME ACTIVE ACTIVITIES AND ADLS

## 2023-08-12 ASSESSMENT — PAIN DESCRIPTION - LOCATION
LOCATION: SHOULDER
LOCATION: SHOULDER

## 2023-08-12 ASSESSMENT — PAIN DESCRIPTION - ORIENTATION
ORIENTATION: LEFT
ORIENTATION: LEFT

## 2023-08-12 ASSESSMENT — PAIN DESCRIPTION - DESCRIPTORS
DESCRIPTORS: ACHING
DESCRIPTORS: ACHING

## 2023-08-12 ASSESSMENT — PAIN SCALES - GENERAL
PAINLEVEL_OUTOF10: 8
PAINLEVEL_OUTOF10: 9

## 2023-08-12 NOTE — CARE COORDINATION
8/12/23, 8:15 AM EDT    Patient goals/plan/ treatment preferences discussed by  and . Patient goals/plan/ treatment preferences reviewed with patient/ family. Patient/ family verbalize understanding of discharge plan and are in agreement with goal/plan/treatment preferences. Understanding was demonstrated using the teach back method. AVS provided by RN at time of discharge, which includes all necessary medical information pertaining to the patients current course of illness, treatment, post-discharge goals of care, and treatment preferences.      Services At/After Discharge: [facility] ([facility])

## 2023-08-12 NOTE — PLAN OF CARE
Problem: Discharge Planning  Goal: Discharge to home or other facility with appropriate resources  Outcome: Progressing  Flowsheets (Taken 8/11/2023 2230)  Discharge to home or other facility with appropriate resources:   Identify barriers to discharge with patient and caregiver   Arrange for needed discharge resources and transportation as appropriate   Identify discharge learning needs (meds, wound care, etc)     Problem: Pain  Goal: Verbalizes/displays adequate comfort level or baseline comfort level  Outcome: Progressing  Flowsheets (Taken 8/11/2023 2230)  Verbalizes/displays adequate comfort level or baseline comfort level:   Encourage patient to monitor pain and request assistance   Assess pain using appropriate pain scale   Administer analgesics based on type and severity of pain and evaluate response   Implement non-pharmacological measures as appropriate and evaluate response     Problem: Safety - Adult  Goal: Free from fall injury  Outcome: Progressing  Flowsheets (Taken 8/11/2023 2230)  Free From Fall Injury:   Instruct family/caregiver on patient safety   Based on caregiver fall risk screen, instruct family/caregiver to ask for assistance with transferring infant if caregiver noted to have fall risk factors     Problem: Skin/Tissue Integrity  Goal: Absence of new skin breakdown  Description: 1. Monitor for areas of redness and/or skin breakdown  2. Assess vascular access sites hourly  3. Every 4-6 hours minimum:  Change oxygen saturation probe site  4. Every 4-6 hours:  If on nasal continuous positive airway pressure, respiratory therapy assess nares and determine need for appliance change or resting period. Outcome: Progressing     Care plan reviewed with patient. Patient verbalizes understanding of the plan of care and contributes to goal setting.

## (undated) DEVICE — GLOVE ORANGE PI 8 1/2   MSG9085

## (undated) DEVICE — SUTURE STRATAFIX SZ 3-0 30CM NONABSORB UD 26MM FS 3/8 SXMP2B412

## (undated) DEVICE — STRYKER PERFORMANCE SERIES SAGITTAL BLADE: Brand: STRYKER PERFORMANCE SERIES

## (undated) DEVICE — HYPODERMIC SAFETY NEEDLE: Brand: MAGELLAN

## (undated) DEVICE — VORTEX VACUUM MIXING SYSTEM: Brand: VORTEX

## (undated) DEVICE — SYRINGE, LUER LOCK, 60ML: Brand: MEDLINE

## (undated) DEVICE — GLOVE SURG SZ 9 THK91MIL LTX FREE SYN POLYISOPRENE ANTI

## (undated) DEVICE — SYRINGE IRRIG 60ML SFT PLIABLE BLB EZ TO GRP 1 HND USE W/

## (undated) DEVICE — DRESSING,GAUZE,XEROFORM,CURAD,5"X9",ST: Brand: CURAD

## (undated) DEVICE — LIQUIBAND RAPID ADHESIVE 36/CS 0.8ML: Brand: MEDLINE

## (undated) DEVICE — C-ARM: Brand: UNBRANDED

## (undated) DEVICE — SUTURE FIBERWIRE SZ 2 W/ TAPERED NEEDLE BLUE L38IN NONABSORB BLU L26.5MM 1/2 CIRCLE AR7200

## (undated) DEVICE — ROYAL SILK SURGICAL GOWN, XXL: Brand: CONVERTORS

## (undated) DEVICE — SPONGE GZ W4XL4IN COT 12 PLY TYP VII WVN C FLD DSGN STERILE

## (undated) DEVICE — SHOULDER: Brand: MEDLINE INDUSTRIES, INC.

## (undated) DEVICE — Device

## (undated) DEVICE — RESTRAINT POS UNIV HD NK ALIGN DISP FOR SHLDR PROC

## (undated) DEVICE — PACK-MAJOR

## (undated) DEVICE — APPLICATOR MEDICATED 26 CC SOLUTION HI LT ORNG CHLORAPREP

## (undated) DEVICE — SPONGE LAP W18XL18IN WHT COT 4 PLY FLD STRUNG RADPQ DISP ST

## (undated) DEVICE — INTENDED FOR TISSUE SEPARATION, AND OTHER PROCEDURES THAT REQUIRE A SHARP SURGICAL BLADE TO PUNCTURE OR CUT.: Brand: BARD-PARKER ® CARBON RIB-BACK BLADES

## (undated) DEVICE — 6619 2 PTNT ISO SYS INCISE AREA&LT;(&GT;&&LT;)&GT;P: Brand: STERI-DRAPE™ IOBAN™ 2

## (undated) DEVICE — SUTURE ETHBND SZ 1 L30IN NONABSORBABLE GRN OS-6 L36MM 1/2 X538H

## (undated) DEVICE — SPONGE LAP W18XL18IN WHT COT 4 PLY FLD STRUNG RADPQ DISP ST 2 PER PACK

## (undated) DEVICE — DRAPE C ARM W36XL30IN RECTANG BND BG AND TAPE

## (undated) DEVICE — SYRINGE MED 10ML LUERLOCK TIP W/O SFTY DISP

## (undated) DEVICE — GAUZE,SPONGE,8"X4",12PLY,XRAY,STRL,LF: Brand: MEDLINE

## (undated) DEVICE — BIT DRL DIA2.7MM PERIPH SCR REUSE FOR COMPHSVE REV SHLDR

## (undated) DEVICE — NEEDLE SPNL L3.5IN PNK HUB S STL REG WALL FIT STYL W/ QNCKE

## (undated) DEVICE — 450 ML BOTTLE OF 0.05% CHLORHEXIDINE GLUCONATE IN 99.95% STERILE WATER FOR IRRIGATION, USP AND APPLICATOR.: Brand: IRRISEPT ANTIMICROBIAL WOUND LAVAGE

## (undated) DEVICE — HIGH CAPACITY NARROW INTRAMEDULLARY TIP: Brand: PULSAVAC®

## (undated) DEVICE — DRESSING TRNSPAR W4XL10IN FLM MIC POR SURESITE 123

## (undated) DEVICE — BLADE,CARBON-STEEL,10,STRL,DISPOSABLE,TB: Brand: MEDLINE

## (undated) DEVICE — 4.0MM SHORT PILOT DRILL WITH AO CONNECTOR: Brand: TRIGEN

## (undated) DEVICE — PACK PROCEDURE SURG ORTH BASIC SRHP LF

## (undated) DEVICE — SYSTEM SKIN CLSR 22CM DERMBND PRINEO

## (undated) DEVICE — ADHESIVE SKIN CLOSURE WND 8.661X1.5 IN 22 CM LIQUIBAND SECUR

## (undated) DEVICE — SLING ARM M L1375IN D75IN WHT POLY MESH ENVELOP MTL SIDE

## (undated) DEVICE — BAG,BANDED,W/RUBBERBAND,STERILE,30X36: Brand: MEDLINE

## (undated) DEVICE — SUTURE VCRL + SZ 2-0 L27IN ABSRB VLT L26MM CT-2 1/2 CIR VCP333H

## (undated) DEVICE — PACK PROCEDURE SURG SET UP SRMC

## (undated) DEVICE — SUTURE STRATAFIX SPRL SZ 2-0 L9IN ABSRB VLT MH L36MM 1/2 SXPD2B408

## (undated) DEVICE — DUAL CUT SAGITTAL BLADE

## (undated) DEVICE — GUIDE PIN 3.2MM X 343MM: Brand: TRIGEN

## (undated) DEVICE — SUTURE VCRL + 1 L27IN ABSRB UD CT-1 L36MM 1/2 CIR TAPR PNT VCP261H

## (undated) DEVICE — BASIC SINGLE BASIN BTC-LF: Brand: MEDLINE INDUSTRIES, INC.